# Patient Record
Sex: FEMALE | Race: BLACK OR AFRICAN AMERICAN | Employment: OTHER | URBAN - METROPOLITAN AREA
[De-identification: names, ages, dates, MRNs, and addresses within clinical notes are randomized per-mention and may not be internally consistent; named-entity substitution may affect disease eponyms.]

---

## 2017-01-18 ENCOUNTER — OFFICE VISIT (OUTPATIENT)
Dept: FAMILY MEDICINE CLINIC | Age: 62
End: 2017-01-18

## 2017-01-18 VITALS
OXYGEN SATURATION: 96 % | HEART RATE: 78 BPM | RESPIRATION RATE: 16 BRPM | SYSTOLIC BLOOD PRESSURE: 127 MMHG | TEMPERATURE: 98.1 F | HEIGHT: 64 IN | DIASTOLIC BLOOD PRESSURE: 64 MMHG | BODY MASS INDEX: 33.53 KG/M2 | WEIGHT: 196.4 LBS

## 2017-01-18 DIAGNOSIS — F41.9 ANXIETY: ICD-10-CM

## 2017-01-18 DIAGNOSIS — Z79.899 ENCOUNTER FOR LONG-TERM (CURRENT) USE OF MEDICATIONS: ICD-10-CM

## 2017-01-18 DIAGNOSIS — E78.00 HYPERCHOLESTEROLEMIA: ICD-10-CM

## 2017-01-18 DIAGNOSIS — Q82.8 PALMOPLANTAR KERATODERMA: Primary | ICD-10-CM

## 2017-01-18 DIAGNOSIS — I10 ESSENTIAL HYPERTENSION: ICD-10-CM

## 2017-01-18 RX ORDER — ALPRAZOLAM 0.5 MG/1
0.5 TABLET ORAL
Qty: 30 TAB | Refills: 1 | Status: SHIPPED | OUTPATIENT
Start: 2017-01-18 | End: 2018-02-19 | Stop reason: SDUPTHER

## 2017-01-18 RX ORDER — UREA 40 G/100G
LOTION TOPICAL
Qty: 236.6 ML | Refills: 0 | Status: SHIPPED | OUTPATIENT
Start: 2017-01-18 | End: 2020-12-29 | Stop reason: ALTCHOICE

## 2017-01-18 RX ORDER — TRAMADOL HYDROCHLORIDE 50 MG/1
50 TABLET ORAL
Qty: 45 TAB | Refills: 0 | Status: SHIPPED | OUTPATIENT
Start: 2017-01-18 | End: 2018-08-13 | Stop reason: ALTCHOICE

## 2017-01-18 NOTE — PROGRESS NOTES
Subjective:     Chief Complaint   Patient presents with    Follow-up     Dyshidrotic eczema      She  is a 59 y.o. 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 Seen at [facility] and tried Prednisone. Tried diabetic foot soaks and Carasol? No exposures to anything new. No changes in footwear. No new gloves. No new soaps, detergents, new foods. Gold bond did not help much. Does not get manicures or pedicures anymore. No recent stressful events. Tried Clobetasol for hand and foot eczema at last appt and Amlactin to help with scaling but did not seem to help. Pain is worse in cracks on soles. Pt has never had a rash like this before. Thought to be Keratoderm. Seen by Derm and confirmed with bx. Started on Urea lotion so far. HTN & HLD  Doing well on current meds with no medication side effects noted  No TIA's, no chest pain on exertion, no dyspnea on exertion, no swelling of ankles. Exercising - walking daily x 2 miles daily  Dieting - Yes with weight watchers, grieving since brother passed away about 4 months ago and has ended up comfort eating occ.   Smoking - No     Lab Results   Component Value Date/Time    Cholesterol, total 151 06/06/2016 12:00 AM    HDL Cholesterol 27 06/06/2016 12:00 AM    LDL, calculated 84 06/06/2016 12:00 AM    Triglyceride 202 06/06/2016 12:00 AM       ROS  Gen - no fever/chills  Resp - no dyspnea or cough  CV - no chest pain or SORENSON  Skin - per HPI  Rest per HPI    Past Medical History   Diagnosis Date    Anxiety state, unspecified     Depression     Diverticulosis     DJD (DEGENERATIVE JOINT DISEASE) OF KNEE     GERD (gastroesophageal reflux disease)     Hypercholesterolemia     Hypertension     Prepyloric ulcer 2008     Past Surgical History   Procedure Laterality Date    Endoscopy, colon, diagnostic       due 2015     Current Outpatient Prescriptions on File Prior to Visit   Medication Sig Dispense Refill    lisinopril-hydroCHLOROthiazide (PRINZIDE, ZESTORETIC) 20-25 mg per tablet TAKE ONE TABLET BY MOUTH ONCE DAILY 90 Tab 1     No current facility-administered medications on file prior to visit. Objective:     Vitals:    01/18/17 1559   BP: 127/64   Pulse: 78   Resp: 16   Temp: 98.1 °F (36.7 °C)   TempSrc: Oral   SpO2: 96%   Weight: 196 lb 6.4 oz (89.1 kg)   Height: 5' 4\" (1.626 m)       Physical Examination:  General appearance - alert, well appearing, and in no distress  Eyes -sclera anicteric  Neck - no thyromegaly, no LAD  Chest - clear to auscultation, no wheezes, rales or rhonchi, symmetric air entry  Heart - normal rate, regular rhythm, normal S1, S2, no murmurs, rubs, clicks or gallops  Extr - no edema  Skin - bilat hands and bilat feet with severe skin thickening, scaling, cracking, and irritation along soles. Feet with mild non-pitting edema          Assessment/ Plan:   Yoli Grover was seen today for follow-up. Diagnoses and all orders for this visit:    Palmoplantar keratoderma - getting 2nd opinion with another Derm for further tx. Emollients and keratolytics. Tramadol for pain control.  -     urea 40 % lotion; Apply  to affected area nightly. -     traMADol (ULTRAM) 50 mg tablet; Take 1 Tab by mouth every eight (8) hours as needed for Pain. Max Daily Amount: 150 mg.    Essential hypertension - well controlled  -     METABOLIC PANEL, COMPREHENSIVE    Hypercholesterolemia - stable  -     METABOLIC PANEL, COMPREHENSIVE  -     LIPID PANEL    Encounter for long-term (current) use of medications  -     METABOLIC PANEL, COMPREHENSIVE  -     LIPID PANEL    I have discussed the diagnosis with the patient and the intended plan as seen in the above orders. The patient has received an after-visit summary and questions were answered concerning future plans. I have discussed medication side effects and warnings with the patient as well.   The patient verbalizes understanding and agreement with the plan. Follow-up Disposition:  Return in about 6 months (around 7/18/2017), or if symptoms worsen or fail to improve.

## 2017-01-18 NOTE — MR AVS SNAPSHOT
Visit Information Date & Time Provider Department Dept. Phone Encounter #  
 1/18/2017  3:40 PM Alicia Segal MD [facility] at [address] [id number] Follow-up Instructions Return in about 6 months (around 7/18/2017), or if symptoms worsen or fail to improve. Upcoming Health Maintenance Date Due Hepatitis C Screening 1955 PAP AKA CERVICAL CYTOLOGY 3/10/2014 INFLUENZA AGE 9 TO ADULT 8/1/2016 BREAST CANCER SCRN MAMMOGRAM 10/24/2018 DTaP/Tdap/Td series (2 - Td) 5/16/2026 COLONOSCOPY 12/9/2026 Allergies as of 1/18/2017  Review Complete On: 1/18/2017 By: Alicia Segal MD  
 No Known Allergies Current Immunizations  Never Reviewed No immunizations on file. Not reviewed this visit You Were Diagnosed With   
  
 Codes Comments Palmoplantar keratoderma    -  Primary ICD-10-CM: Q82.8 ICD-9-CM: 757.39 Anxiety     ICD-10-CM: F41.9 ICD-9-CM: 300.00 Essential hypertension     ICD-10-CM: I10 
ICD-9-CM: 401.9 Hypercholesterolemia     ICD-10-CM: E78.00 ICD-9-CM: 272.0 Encounter for long-term (current) use of medications     ICD-10-CM: Z79.899 ICD-9-CM: V58.69 Vitals BP Pulse Temp Resp Height(growth percentile) Weight(growth percentile) 127/64 (BP 1 Location: Left arm, BP Patient Position: Sitting) 78 98.1 °F (36.7 °C) (Oral) 16 5' 4\" (1.626 m) 196 lb 6.4 oz (89.1 kg) SpO2 BMI OB Status Smoking Status 96% 33.71 kg/m2 Postmenopausal Former Smoker BMI and BSA Data Body Mass Index Body Surface Area 33.71 kg/m 2 2.01 m 2 Preferred Pharmacy Pharmacy Name Phone [facility] [place], [address] [phone] Your Updated Medication List  
  
   
This list is accurate as of: 1/18/17  4:48 PM.  Always use your most recent med list.  
  
  
  
  
 ALPRAZolam 0.5 mg tablet Commonly known as:  Shyrl Asai Take 1 Tab by mouth nightly as needed. lisinopril-hydroCHLOROthiazide 20-25 mg per tablet Commonly known as:  PRINZIDE, ZESTORETIC  
TAKE ONE TABLET BY MOUTH ONCE DAILY  
  
 traMADol 50 mg tablet Commonly known as:  ULTRAM  
Take 1 Tab by mouth every eight (8) hours as needed for Pain. Max Daily Amount: 150 mg.  
  
 urea 40 % lotion Apply  to affected area nightly. Prescriptions Printed Refills ALPRAZolam (XANAX) 0.5 mg tablet 1 Sig: Take 1 Tab by mouth nightly as needed. Class: Print Route: Oral  
 traMADol (ULTRAM) 50 mg tablet 0 Sig: Take 1 Tab by mouth every eight (8) hours as needed for Pain. Max Daily Amount: 150 mg.  
 Class: Print Route: Oral  
  
Prescriptions Sent to Pharmacy Refills  
 urea 40 % lotion 0 Sig: Apply  to affected area nightly. Class: Normal  
 Pharmacy: [address] [facility], [address] Ph #: [phone] Route: Topical  
  
We Performed the Following LIPID PANEL [60247 CPT(R)] METABOLIC PANEL, COMPREHENSIVE [96827 CPT(R)] Follow-up Instructions Return in about 6 months (around 7/18/2017), or if symptoms worsen or fail to improve. Introducing [facility]! Dear Dona Peabody: Thank you for requesting a [employer] account. Our records indicate that you have previously registered for a [employer] account but its currently inactive. Please call our [employer] support line at [phone]. Additional Information If you have questions, please visit the Frequently Asked Questions section of the [employer] website at [email or web address] [email or web address] Lookback. [email or web address]/[email or web address]/. Remember, [employer] is NOT to be used for urgent needs. For medical emergencies, dial 911. Now available from your iPhone and Android! Please provide this summary of care documentation to your next provider. Your primary care clinician is listed as Paris Alonso.  If you have any questions after today's visit, please call [phone].

## 2017-02-09 LAB
ALBUMIN SERPL-MCNC: 4.1 G/DL (ref 3.6–4.8)
ALBUMIN/GLOB SERPL: 1.1 {RATIO} (ref 1.1–2.5)
ALP SERPL-CCNC: 81 IU/L (ref 39–117)
ALT SERPL-CCNC: 16 IU/L (ref 0–32)
AST SERPL-CCNC: 19 IU/L (ref 0–40)
BILIRUB SERPL-MCNC: 0.3 MG/DL (ref 0–1.2)
BUN SERPL-MCNC: 18 MG/DL (ref 8–27)
BUN/CREAT SERPL: 22 (ref 11–26)
CALCIUM SERPL-MCNC: 9.4 MG/DL (ref 8.7–10.3)
CHLORIDE SERPL-SCNC: 97 MMOL/L (ref 96–106)
CHOLEST SERPL-MCNC: 162 MG/DL (ref 100–199)
CO2 SERPL-SCNC: 28 MMOL/L (ref 18–29)
CREAT SERPL-MCNC: 0.83 MG/DL (ref 0.57–1)
GLOBULIN SER CALC-MCNC: 3.6 G/DL (ref 1.5–4.5)
GLUCOSE SERPL-MCNC: 96 MG/DL (ref 65–99)
HDLC SERPL-MCNC: 30 MG/DL
LDLC SERPL CALC-MCNC: 101 MG/DL (ref 0–99)
POTASSIUM SERPL-SCNC: 4.2 MMOL/L (ref 3.5–5.2)
PROT SERPL-MCNC: 7.7 G/DL (ref 6–8.5)
SODIUM SERPL-SCNC: 139 MMOL/L (ref 134–144)
TRIGL SERPL-MCNC: 156 MG/DL (ref 0–149)
VLDLC SERPL CALC-MCNC: 31 MG/DL (ref 5–40)

## 2017-02-22 ENCOUNTER — PATIENT OUTREACH (OUTPATIENT)
Dept: OTHER | Age: 62
End: 2017-02-22

## 2017-02-27 ENCOUNTER — PATIENT OUTREACH (OUTPATIENT)
Dept: OTHER | Age: 62
End: 2017-02-27

## 2017-09-29 ENCOUNTER — TELEPHONE (OUTPATIENT)
Dept: FAMILY MEDICINE CLINIC | Age: 62
End: 2017-09-29

## 2017-09-29 NOTE — TELEPHONE ENCOUNTER
Referred to Medical Records [phone] or fax request to [phone]. Company requesting patient records for last five years.

## 2017-09-29 NOTE — TELEPHONE ENCOUNTER
----- Message from Karlo Almodovar sent at 9/29/2017 10:25 AM EDT -----  Regarding: Dr. Zoie Jackson from [facility], [facility], inquiring if the office received faxed and mailed forms regarding pt. Also inquiring on return time. (f) [id number] Best contact number [phone] case number [id number].

## 2017-12-14 DIAGNOSIS — L30.1 DYSHIDROTIC ECZEMA: ICD-10-CM

## 2017-12-14 RX ORDER — LISINOPRIL AND HYDROCHLOROTHIAZIDE 20; 25 MG/1; MG/1
TABLET ORAL
Qty: 90 TAB | Refills: 1 | Status: SHIPPED | OUTPATIENT
Start: 2017-12-14 | End: 2018-06-15 | Stop reason: SDUPTHER

## 2017-12-14 NOTE — LETTER
12/15/2017 10:55 AM 
 
Ms. Ana Cristina Chang 
[address] [phone] Dear Ms. Adam Lazar missed you! Please call our office at [phone] and schedule a follow up appointment for your continued care.  
 
 
 
Sincerely, 
 
 
Forest Bennett MD

## 2018-02-19 ENCOUNTER — OFFICE VISIT (OUTPATIENT)
Dept: FAMILY MEDICINE CLINIC | Age: 63
End: 2018-02-19

## 2018-02-19 VITALS
DIASTOLIC BLOOD PRESSURE: 67 MMHG | BODY MASS INDEX: 32.33 KG/M2 | TEMPERATURE: 97.9 F | SYSTOLIC BLOOD PRESSURE: 134 MMHG | WEIGHT: 189.4 LBS | OXYGEN SATURATION: 98 % | HEART RATE: 76 BPM | HEIGHT: 64 IN | RESPIRATION RATE: 16 BRPM

## 2018-02-19 DIAGNOSIS — Z00.00 WELL ADULT EXAM: Primary | ICD-10-CM

## 2018-02-19 DIAGNOSIS — F41.9 ANXIETY: ICD-10-CM

## 2018-02-19 DIAGNOSIS — E78.00 HYPERCHOLESTEROLEMIA: ICD-10-CM

## 2018-02-19 DIAGNOSIS — I10 ESSENTIAL HYPERTENSION: ICD-10-CM

## 2018-02-19 DIAGNOSIS — R73.03 PREDIABETES: ICD-10-CM

## 2018-02-19 LAB
BILIRUB UR QL STRIP: NEGATIVE
GLUCOSE UR-MCNC: NEGATIVE MG/DL
KETONES P FAST UR STRIP-MCNC: NEGATIVE MG/DL
PH UR STRIP: 5.5 [PH] (ref 4.6–8)
PROT UR QL STRIP: NEGATIVE
SP GR UR STRIP: 1.02 (ref 1–1.03)
UA UROBILINOGEN AMB POC: NORMAL (ref 0.2–1)
URINALYSIS CLARITY POC: CLEAR
URINALYSIS COLOR POC: YELLOW
URINE BLOOD POC: NORMAL
URINE LEUKOCYTES POC: NORMAL
URINE NITRITES POC: NEGATIVE

## 2018-02-19 RX ORDER — ALPRAZOLAM 0.5 MG/1
0.5 TABLET ORAL
Qty: 30 TAB | Refills: 1 | Status: SHIPPED | OUTPATIENT
Start: 2018-02-19 | End: 2018-08-13 | Stop reason: SDUPTHER

## 2018-02-19 NOTE — PROGRESS NOTES
Chief Complaint   Patient presents with    Complete Physical   .1. Have you been to the ER, urgent care clinic since your last visit? Hospitalized since your last visit? No    2. Have you seen or consulted any other health care providers outside of the [address] [place] [address] since your last visit? Include any pap smears or colon screening. Yes When: seen by Dermatologist [facility]) 8/2017 & GYN 10/2017  Patient concerned about: right lower back pain, right hip and right knee pain occasionally that started 2 weeks ago and funny feeling of upper Quadrant of Abdomen that started 2-3 months ago and numbness, tingling and tightness of hands for 6-7 months.   Patient stated that she had her Mammogram and Pap done at [facility] 10/2017  Room #8

## 2018-02-19 NOTE — MR AVS SNAPSHOT
[address] [facility] [address] [facility] 
[phone] Patient: Bob Toledo MRN:  [id number] Visit Information Date & Time Provider Department Dept. Phone Encounter #  
 2/19/2018  3:40 PM Eli Nolasco MD [facility] at [address] [id number] Follow-up Instructions Return in about 6 months (around 8/19/2018), or if symptoms worsen or fail to improve, for Regular Follow up. Upcoming Health Maintenance Date Due Hepatitis C Screening 1955 PAP AKA CERVICAL CYTOLOGY 3/10/2014 Influenza Age 5 to Adult 8/1/2017 BREAST CANCER SCRN MAMMOGRAM 10/24/2018 DTaP/Tdap/Td series (2 - Td) 5/16/2026 COLONOSCOPY 12/9/2026 Allergies as of 2/19/2018  Review Complete On: 2/19/2018 By: Trey Shine No Known Allergies Current Immunizations  Never Reviewed No immunizations on file. Not reviewed this visit You Were Diagnosed With   
  
 Codes Comments Well adult exam    -  Primary ICD-10-CM: Z00.00 ICD-9-CM: V70.0 Anxiety     ICD-10-CM: F41.9 ICD-9-CM: 300.00 Essential hypertension     ICD-10-CM: I10 
ICD-9-CM: 401.9 Hypercholesterolemia     ICD-10-CM: E78.00 ICD-9-CM: 272.0 Prediabetes     ICD-10-CM: R73.03 
ICD-9-CM: 790.29 Vitals BP Pulse Temp Resp Height(growth percentile) Weight(growth percentile) 134/67 (BP 1 Location: Left arm, BP Patient Position: Sitting) 76 97.9 °F (36.6 °C) (Oral) 16 5' 4\" (1.626 m) 189 lb 6.4 oz (85.9 kg) SpO2 BMI OB Status Smoking Status 98% 32.51 kg/m2 Postmenopausal Former Smoker Vitals History BMI and BSA Data Body Mass Index Body Surface Area 32.51 kg/m 2 1.97 m 2 Preferred Pharmacy Pharmacy Name Phone [address] [phone] Your Updated Medication List  
  
   
 This list is accurate as of: 2/19/18  4:33 PM.  Always use your most recent med list.  
  
  
  
  
 ALPRAZolam 0.5 mg tablet Commonly known as:  Elray Bald Take 1 Tab by mouth nightly as needed. lisinopril-hydroCHLOROthiazide 20-25 mg per tablet Commonly known as:  PRINZIDE, ZESTORETIC  
TAKE ONE TABLET BY MOUTH ONCE DAILY  
  
 ONE-A-DAY WOMEN'S 50 PLUS PO Take 1 Tab by mouth daily. traMADol 50 mg tablet Commonly known as:  ULTRAM  
Take 1 Tab by mouth every eight (8) hours as needed for Pain. Max Daily Amount: 150 mg.  
  
 urea 40 % lotion Apply  to affected area nightly. Prescriptions Printed Refills ALPRAZolam (XANAX) 0.5 mg tablet 1 Sig: Take 1 Tab by mouth nightly as needed. Class: Print Route: Oral  
  
We Performed the Following AMB POC URINALYSIS DIP STICK AUTO W/O MICRO [28152 CPT(R)] CBC W/O DIFF [85090 CPT(R)] HEMOGLOBIN A1C WITH EAG [61007 CPT(R)] LIPID PANEL [83535 CPT(R)] METABOLIC PANEL, COMPREHENSIVE [26902 CPT(R)] TSH 3RD GENERATION [83177 CPT(R)] Follow-up Instructions Return in about 6 months (around 8/19/2018), or if symptoms worsen or fail to improve, for Regular Follow up. Introducing [facility]! Dear Thomas Maillard: Thank you for requesting a [employer] account. Our records indicate that you have previously registered for a [employer] account but its currently inactive. Please call our [employer] support line at [phone]. Additional Information If you have questions, please visit the Frequently Asked Questions section of the [employer] website at [email or web address]. [email or web address] Remember, [employer] is NOT to be used for urgent needs. For medical emergencies, dial 911. Now available from your iPhone and Android! Please provide this summary of care documentation to your next provider. Your primary care clinician is listed as Jj Zepeda.  If you have any questions after today's visit, please call [phone].

## 2018-02-19 NOTE — PROGRESS NOTES
Subjective:     Chief Complaint   Patient presents with    Complete Physical        She  is a 57 y.o. female who presents for evaluation of:  Colonoscopy - UTD  [facility] on paps and mammos    Hyperlipidemia & HTN  Pt is doing well on current meds with no medication side effects noted  No new myalgias, no joint pains, no weakness  No TIA's, no chest pain on exertion, no dyspnea on exertion, no swelling of ankles.   Exercising - Minimal, pain in knees, back, and right hip  Dieting - Yes  Smoking - No     Lab Results   Component Value Date/Time    Cholesterol, total 162 02/08/2017 07:49 AM    HDL Cholesterol 30 (L) 02/08/2017 07:49 AM    LDL, calculated 101 (H) 02/08/2017 07:49 AM    Triglyceride 156 (H) 02/08/2017 07:49 AM     ROS:  Constitutional: negative for fevers, chills and fatigue  Eyes: negative for visual disturbance  Ears, nose, mouth, throat, and face: negative for hearing loss and sore throat  Respiratory: negative for cough or dyspnea on exertion  Cardiovascular: negative for chest pain, dyspnea, palpitations, fatigue  Gastrointestinal: negative for nausea, vomiting, change in bowel habits, diarrhea and abdominal pain  Genitourinary:negative for frequency and dysuria  Integument/breast: 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  Hematologic/lymphatic: negative for easy bruising and bleeding  Musculoskeletal:negative for myalgias and muscle weakness  Neurological: negative for headaches and dizziness  Behavioral/Psych: negative for anxiety and depression     Objective:     Vitals:    02/19/18 1551   BP: 134/67   Pulse: 76   Resp: 16   Temp: 97.9 °F (36.6 °C)   TempSrc: Oral   SpO2: 98%   Weight: 189 lb 6.4 oz (85.9 kg)   Height: 5' 4\" (1.626 m)     Physical Examination:  General appearance - alert, well appearing, and in no distress  Eyes - sclera anicteric  Nose - normal and patent, no erythema, discharge or polyps  Mouth - mucous membranes moist, pharynx normal without lesions  Neck - supple, no significant adenopathy  Lymphatics - no palpable lymphadenopathy, no hepatosplenomegaly  Chest - clear to auscultation, no wheezes, rales or rhonchi, symmetric air entry  Heart - normal rate, regular rhythm, normal S1, S2, no murmurs, rubs, clicks or gallops  Abdomen - soft, nontender, nondistended, no masses or organomegaly  Breasts & Pelvic - exam declined by the patient as done by OB/GYN  Neurological - alert, oriented, normal speech, no focal findings or movement disorder noted  Musculoskeletal - no joint tenderness, deformity or swelling  Extr - trace edema  Skin - bilat feet with thickening, scaling, cracking, and irritation on bilat soles - much improved    Past Medical History:   Diagnosis Date    Anxiety state, unspecified     Depression     Diverticulosis     DJD (DEGENERATIVE JOINT DISEASE) OF KNEE     GERD (gastroesophageal reflux disease)     Hypercholesterolemia     Hypertension     Prepyloric ulcer 2008     Past Surgical History:   Procedure Laterality Date    ENDOSCOPY, COLON, DIAGNOSTIC      due 2015     Current Outpatient Prescriptions on File Prior to Visit   Medication Sig Dispense Refill    lisinopril-hydroCHLOROthiazide (PRINZIDE, ZESTORETIC) 20-25 mg per tablet TAKE ONE TABLET BY MOUTH ONCE DAILY 90 Tab 1    urea 40 % lotion Apply  to affected area nightly. 236.6 mL 0    traMADol (ULTRAM) 50 mg tablet Take 1 Tab by mouth every eight (8) hours as needed for Pain. Max Daily Amount: 150 mg. 45 Tab 0     No current facility-administered medications on file prior to visit. Assessment/ Plan:   Diagnoses and all orders for this visit:    1. Well adult exam-up-to-date with routine screening  -     CBC W/O DIFF  -     HEMOGLOBIN A1C WITH EAG  -     LIPID PANEL  -     METABOLIC PANEL, COMPREHENSIVE  -     TSH 3RD GENERATION  -     AMB POC URINALYSIS DIP STICK AUTO W/O MICRO    2.  Anxiety- rare use of Xanax and does well with no regular medications, working on daily exercise and has good social support  -     ALPRAZolam (XANAX) 0.5 mg tablet; Take 1 Tab by mouth nightly as needed. 3. Essential hypertension-well-controlled  -     METABOLIC PANEL, COMPREHENSIVE  -     AMB POC URINALYSIS DIP STICK AUTO W/O MICRO    4. Hypercholesterolemia-stable  -     HEMOGLOBIN A1C WITH EAG  -     LIPID PANEL  -     METABOLIC PANEL, COMPREHENSIVE  -     TSH 3RD GENERATION    5. Prediabetes-stable, encouraged working on exercise and diet regularly.  -     HEMOGLOBIN A1C WITH EAG    6. BMI 32.0-32.9,adult  Discussed the patient's BMI. The BMI follow up plan is as follows:   dietary management education, guidance, and counseling  encourage exercise  monitor weight  prescribed dietary intake     I have discussed the diagnosis with the patient and the intended plan as seen in the above orders. The patient has received an after-visit summary and questions were answered concerning future plans. I have discussed medication side effects and warnings with the patient as well. The patient verbalizes understanding and agreement with the plan. Follow-up Disposition:  Return in about 6 months (around 8/19/2018), or if symptoms worsen or fail to improve, for Regular Follow up.

## 2018-02-22 LAB
ALBUMIN SERPL-MCNC: 4.3 G/DL (ref 3.6–4.8)
ALBUMIN/GLOB SERPL: 1.2 {RATIO} (ref 1.2–2.2)
ALP SERPL-CCNC: 83 IU/L (ref 39–117)
ALT SERPL-CCNC: 22 IU/L (ref 0–32)
AST SERPL-CCNC: 22 IU/L (ref 0–40)
BILIRUB SERPL-MCNC: 0.3 MG/DL (ref 0–1.2)
BUN SERPL-MCNC: 14 MG/DL (ref 8–27)
BUN/CREAT SERPL: 16 (ref 12–28)
CALCIUM SERPL-MCNC: 9.6 MG/DL (ref 8.7–10.3)
CHLORIDE SERPL-SCNC: 97 MMOL/L (ref 96–106)
CHOLEST SERPL-MCNC: 186 MG/DL (ref 100–199)
CO2 SERPL-SCNC: 25 MMOL/L (ref 18–29)
CREAT SERPL-MCNC: 0.89 MG/DL (ref 0.57–1)
ERYTHROCYTE [DISTWIDTH] IN BLOOD BY AUTOMATED COUNT: 14.6 % (ref 12.3–15.4)
EST. AVERAGE GLUCOSE BLD GHB EST-MCNC: 126 MG/DL
GFR SERPLBLD CREATININE-BSD FMLA CKD-EPI: 69 ML/MIN/{1.73_M2}
GFR SERPLBLD CREATININE-BSD FMLA CKD-EPI: 79 ML/MIN/{1.73_M2}
GLOBULIN SER CALC-MCNC: 3.6 G/L (ref 1.5–4.5)
GLUCOSE SERPL-MCNC: 96 MG/DL (ref 65–99)
HBA1C MFR BLD: 6 % (ref 4.8–5.6)
HCT VFR BLD AUTO: 39.8 % (ref 34–46.6)
HDLC SERPL-MCNC: 31 MG/DL
HGB BLD-MCNC: 13.2 G/DL (ref 11.1–15.9)
LDLC SERPL CALC-MCNC: 117 MG/DL (ref 0–99)
MCH RBC QN AUTO: 27.2 PG (ref 26.6–33)
MCHC RBC AUTO-ENTMCNC: 33.2 G/DL (ref 31.5–35.7)
MCV RBC AUTO: 82 FL (ref 79–97)
PLATELET # BLD AUTO: 326 X10E3/UL (ref 150–379)
POTASSIUM SERPL-SCNC: 4.2 MMOL/L (ref 3.5–5.2)
PROT SERPL-MCNC: 7.9 G/DL (ref 6–8.5)
RBC # BLD AUTO: 4.86 X10E6/UL (ref 3.77–5.28)
SODIUM SERPL-SCNC: 137 MMOL/L (ref 134–144)
TRIGL SERPL-MCNC: 192 MG/DL (ref 0–149)
TSH SERPL DL<=0.005 MIU/L-ACNC: 2.1 UIU/ML (ref 0.45–4.5)
VLDLC SERPL CALC-MCNC: 38 MG/DL (ref 5–40)
WBC # BLD AUTO: 6.4 X10E3/UL (ref 3.4–10.8)

## 2018-06-15 DIAGNOSIS — L30.1 DYSHIDROTIC ECZEMA: ICD-10-CM

## 2018-06-15 RX ORDER — LISINOPRIL AND HYDROCHLOROTHIAZIDE 20; 25 MG/1; MG/1
TABLET ORAL
Qty: 90 TAB | Refills: 1 | Status: SHIPPED | OUTPATIENT
Start: 2018-06-15 | End: 2018-12-17 | Stop reason: SDUPTHER

## 2018-08-13 ENCOUNTER — OFFICE VISIT (OUTPATIENT)
Dept: FAMILY MEDICINE CLINIC | Age: 63
End: 2018-08-13

## 2018-08-13 VITALS
DIASTOLIC BLOOD PRESSURE: 61 MMHG | WEIGHT: 193.4 LBS | RESPIRATION RATE: 16 BRPM | OXYGEN SATURATION: 94 % | BODY MASS INDEX: 33.02 KG/M2 | HEIGHT: 64 IN | HEART RATE: 84 BPM | SYSTOLIC BLOOD PRESSURE: 117 MMHG | TEMPERATURE: 98.3 F

## 2018-08-13 DIAGNOSIS — M25.551 RIGHT HIP PAIN: Primary | ICD-10-CM

## 2018-08-13 DIAGNOSIS — F41.9 ANXIETY: ICD-10-CM

## 2018-08-13 DIAGNOSIS — E78.00 HYPERCHOLESTEROLEMIA: ICD-10-CM

## 2018-08-13 DIAGNOSIS — S76.211D GROIN STRAIN, RIGHT, SUBSEQUENT ENCOUNTER: ICD-10-CM

## 2018-08-13 DIAGNOSIS — I10 ESSENTIAL HYPERTENSION: ICD-10-CM

## 2018-08-13 DIAGNOSIS — Z79.899 ENCOUNTER FOR LONG-TERM (CURRENT) USE OF MEDICATIONS: ICD-10-CM

## 2018-08-13 RX ORDER — ALPRAZOLAM 0.5 MG/1
0.5 TABLET ORAL
Qty: 30 TAB | Refills: 1 | Status: SHIPPED | OUTPATIENT
Start: 2018-08-13 | End: 2019-03-12 | Stop reason: SDUPTHER

## 2018-08-13 RX ORDER — PHENTERMINE HYDROCHLORIDE 37.5 MG/1
37.5 TABLET ORAL
Qty: 30 TAB | Refills: 1 | Status: SHIPPED | OUTPATIENT
Start: 2018-08-13 | End: 2019-03-12 | Stop reason: ALTCHOICE

## 2018-08-13 RX ORDER — MELOXICAM 15 MG/1
15 TABLET ORAL
Qty: 30 TAB | Refills: 0 | Status: SHIPPED | OUTPATIENT
Start: 2018-08-13 | End: 2020-07-28 | Stop reason: ALTCHOICE

## 2018-08-13 NOTE — MR AVS SNAPSHOT
[address] [facility] [address] [facility] 
[phone] Patient: Sagrario Peterson MRN:  [id number] Visit Information Date & Time Provider Department Dept. Phone Encounter #  
 8/13/2018  3:30 PM Aleta Wells MD [facility] at [address] [id number] Follow-up Instructions Return in about 3 months (around 11/13/2018). Upcoming Health Maintenance Date Due Hepatitis C Screening 1955 GLAUCOMA SCREENING Q2Y 5/14/2018 Bone Densitometry (Dexa) Screening 5/14/2018 BREAST CANCER SCRN MAMMOGRAM 10/24/2018 Pneumococcal 65+ Low/Medium Risk (1 of 2 - PCV13) 11/24/2018* Influenza Age 5 to Adult 1/25/2019* DTaP/Tdap/Td series (2 - Td) 5/16/2026 COLONOSCOPY 12/9/2026 *Topic was postponed. The date shown is not the original due date. Allergies as of 8/13/2018  Review Complete On: 8/13/2018 By: Jonn Handy LPN No Known Allergies Current Immunizations  Never Reviewed No immunizations on file. Not reviewed this visit You Were Diagnosed With   
  
 Codes Comments Right hip pain    -  Primary ICD-10-CM: M25.551 ICD-9-CM: 719.45 Groin strain, right, subsequent encounter     ICD-10-CM: S76.211D ICD-9-CM: V58.89 Essential hypertension     ICD-10-CM: I10 
ICD-9-CM: 401.9 Hypercholesterolemia     ICD-10-CM: E78.00 ICD-9-CM: 272.0 Encounter for long-term (current) use of medications     ICD-10-CM: Z79.899 ICD-9-CM: V58.69 BMI 33.0-33.9,adult     ICD-10-CM: V65.08 
ICD-9-CM: V85.33 Vitals BP Pulse Temp Resp Height(growth percentile) Weight(growth percentile)  
 117/61 (BP 1 Location: Left arm, BP Patient Position: Sitting) 84 98.3 °F (36.8 °C) (Oral) 16 5' 4\" (1.626 m) 193 lb 6.4 oz (87.7 kg) SpO2 BMI OB Status Smoking Status 94% 33.2 kg/m2 Postmenopausal Former Smoker Vitals History BMI and BSA Data Body Mass Index Body Surface Area  
 33.2 kg/m 2 1.99 m 2 Preferred Pharmacy Pharmacy Name Phone [address] [phone] Your Updated Medication List  
  
   
This list is accurate as of 8/13/18  4:27 PM.  Always use your most recent med list.  
  
  
  
  
 ALPRAZolam 0.5 mg tablet Commonly known as:  Sharyon Kida Take 1 Tab by mouth nightly as needed. lisinopril-hydroCHLOROthiazide 20-25 mg per tablet Commonly known as:  PRINZIDE, ZESTORETIC  
TAKE ONE TABLET BY MOUTH ONCE DAILY  
  
 meloxicam 15 mg tablet Commonly known as:  MOBIC Take 1 Tab by mouth daily (after breakfast). Take x 1 week and then stop. May use daily after this as needed. ONE-A-DAY WOMEN'S 50 PLUS PO Take 1 Tab by mouth daily. phentermine 37.5 mg tablet Commonly known as:  ADIPEX-P Take 1 Tab by mouth every morning. Max Daily Amount: 37.5 mg.  
  
 traMADol 50 mg tablet Commonly known as:  ULTRAM  
Take 1 Tab by mouth every eight (8) hours as needed for Pain. Max Daily Amount: 150 mg.  
  
 urea 40 % lotion Apply  to affected area nightly. Prescriptions Printed Refills  
 phentermine (ADIPEX-P) 37.5 mg tablet 1 Sig: Take 1 Tab by mouth every morning. Max Daily Amount: 37.5 mg.  
 Class: Print Route: Oral  
  
Prescriptions Sent to Pharmacy Refills  
 meloxicam (MOBIC) 15 mg tablet 0 Sig: Take 1 Tab by mouth daily (after breakfast). Take x 1 week and then stop. May use daily after this as needed. Class: Normal  
 Pharmacy: [facility] [address] Ph #: [phone] Route: Oral  
  
We Performed the Following REFERRAL TO PHYSICAL THERAPY [CFU21 Custom] Follow-up Instructions Return in about 3 months (around 11/13/2018). To-Do List   
 08/13/2018 Imaging:  XR HIP RT W OR WO PELV 2-3 VWS Referral Information Referral ID Referred By Referred To  
  
 [id number] [facility] PHYSICAL THERAPY   
   [address] [facility], [address] Phone: [phone] Visits Status Start Date End Date 1 Authorized 8/13/18 8/13/19 If your referral has a status of pending review or denied, additional information will be sent to support the outcome of this decision. Patient Instructions Phentermine - 37.5 mg daily. Take early in the morning. Let me know if you have any problems with increase heart rate, blood pressure, anxiety or trouble sleeping as these can be side effects. If symptoms are mild, you body should acclimate to this and any related symptoms should improve. The most important part of your overall plan for weight loss is to continue working on exercise and diet. Introducing [facility]! Dear Tucker Ziegler: Thank you for requesting a [employer] account. Our records indicate that you already have an active [employer] account. You can access your account anytime at [email or web address] Did you know that you can access your hospital and ER discharge instructions at any time in [employer]? You can also review all of your test results from your hospital stay or ER visit. Additional Information If you have questions, please visit the Frequently Asked Questions section of the [employer] website at [email or web address] Remember, [employer] is NOT to be used for urgent needs. For medical emergencies, dial 911. Now available from your iPhone and Android! Please provide this summary of care documentation to your next provider. Your primary care clinician is listed as Jazmin Mancilla. If you have any questions after today's visit, please call [phone].

## 2018-08-13 NOTE — PATIENT INSTRUCTIONS
Phentermine - 37.5 mg daily. Take early in the morning. Let me know if you have any problems with increase heart rate, blood pressure, anxiety or trouble sleeping as these can be side effects. If symptoms are mild, you body should acclimate to this and any related symptoms should improve. The most important part of your overall plan for weight loss is to continue working on exercise and diet.

## 2018-08-13 NOTE — PROGRESS NOTES
Subjective:     Chief Complaint   Patient presents with    Hypertension     6 month follow-up        She  is a 70 y.o. female who presents for evaluation of:    Hyperlipidemia & HTN  Pt is doing well on current meds with no medication side effects noted  No new myalgias, no joint pains, no weakness  No TIA's, no chest pain on exertion, no dyspnea on exertion, no swelling of ankles. Exercising - Minimal, pain in knees, back, and right hip  Dieting - Yes  Smoking - No     Lab Results   Component Value Date/Time    Cholesterol, total 186 02/21/2018 08:39 AM    HDL Cholesterol 31 (L) 02/21/2018 08:39 AM    LDL, calculated 117 (H) 02/21/2018 08:39 AM    Triglyceride 192 (H) 02/21/2018 08:39 AM     ROS  Gen - no fever/chills  Resp - no dyspnea or cough  CV - no chest pain or SORENSON  Musculoskeletal: R hip pain and weakness. No injury. Sx x 2-3 months. Prev able to walk extensively. Now having to use hand to lift up leg going up stairs. Using APAP and aspercream which seems to help. Worse in buttocks when walking. Rest per HPI     Objective:     Vitals:    08/13/18 1527   BP: 117/61   Pulse: 84   Resp: 16   Temp: 98.3 °F (36.8 °C)   TempSrc: Oral   SpO2: 94%   Weight: 193 lb 6.4 oz (87.7 kg)   Height: 5' 4\" (1.626 m)     Physical Examination:  General appearance - alert, well appearing, and in no distress  Eyes - sclera anicteric  Neck - supple, no significant adenopathy  Lymphatics - no palpable lymphadenopathy, no hepatosplenomegaly  Chest - clear to auscultation, no wheezes, rales or rhonchi, symmetric air entry  Heart - normal rate, regular rhythm, normal S1, S2, no murmurs, rubs, clicks or gallops  Msk - R hip with FROM but ttp over inguinal area and near greater trochanter. Pain with internal and external rotation of hip.   Back - no ttp, neg SLR  Extr - trace edema  Skin - bilat feet with thickening, scaling, cracking, and irritation on bilat soles - much improved    Past Medical History:   Diagnosis Date    Anxiety state, unspecified     Depression     Diverticulosis     DJD (DEGENERATIVE JOINT DISEASE) OF KNEE     GERD (gastroesophageal reflux disease)     Hypercholesterolemia     Hypertension     Prepyloric ulcer 2008     Past Surgical History:   Procedure Laterality Date    ENDOSCOPY, COLON, DIAGNOSTIC      due 2015     Current Outpatient Prescriptions on File Prior to Visit   Medication Sig Dispense Refill    lisinopril-hydroCHLOROthiazide (PRINZIDE, ZESTORETIC) 20-25 mg per tablet TAKE ONE TABLET BY MOUTH ONCE DAILY 90 Tab 1    MV-MN/FOLIC ACID/CALCIUM/VIT K (ONE-A-DAY WOMEN'S 50 PLUS PO) Take 1 Tab by mouth daily.  urea 40 % lotion Apply  to affected area nightly. 236.6 mL 0     No current facility-administered medications on file prior to visit. Assessment/ Plan:   Diagnoses and all orders for this visit:    1. Right hip pain  2. Groin strain, right, subsequent encounter  - will get x-rays and start Mobic. Sending to PT given weakness. May need to get MRI of L spine if not improving. Would like to work on weight loss as well  -     XR HIP RT W OR WO PELV 2-3 VWS; Future  -     [facility]  -     meloxicam (MOBIC) 15 mg tablet; Take 1 Tab by mouth daily (after breakfast). Take x 1 week and then stop. May use daily after this as needed. 3. Essential hypertension - well controlled    4. Hypercholesterolemia - stable    5. Encounter for long-term (current) use of medications    6. BMI 33.0-33.9,adult - unable to work on exercise d/t hip issue. Will use Phentermine and work on diet. -     phentermine (ADIPEX-P) 37.5 mg tablet; Take 1 Tab by mouth every morning. Max Daily Amount: 37.5 mg.    7. Anxiety - chronic issue with appropriate sparing use  -     ALPRAZolam (XANAX) 0.5 mg tablet; Take 1 Tab by mouth nightly as needed. I have discussed the diagnosis with the patient and the intended plan as seen in the above orders.   The patient has received an after-visit summary and questions were answered concerning future plans. I have discussed medication side effects and warnings with the patient as well. The patient verbalizes understanding and agreement with the plan. Follow-up Disposition:  Return in about 3 months (around 11/13/2018).

## 2018-08-16 ENCOUNTER — HOSPITAL ENCOUNTER (OUTPATIENT)
Dept: GENERAL RADIOLOGY | Age: 63
Discharge: HOME | End: 2018-08-16
Payer: COMMERCIAL

## 2018-08-16 DIAGNOSIS — M16.11 PRIMARY OSTEOARTHRITIS OF RIGHT HIP: Primary | ICD-10-CM

## 2018-08-16 DIAGNOSIS — Z92.89: ICD-10-CM

## 2018-08-16 PROCEDURE — 73502 X-RAY EXAM HIP UNI 2-3 VIEWS: CPT

## 2018-08-16 RX ORDER — DICLOFENAC SODIUM 10 MG/G
GEL TOPICAL 4 TIMES DAILY
Qty: 100 G | Refills: 0 | Status: SHIPPED | OUTPATIENT
Start: 2018-08-16 | End: 2018-08-24 | Stop reason: SDUPTHER

## 2018-08-24 ENCOUNTER — TELEPHONE (OUTPATIENT)
Dept: FAMILY MEDICINE CLINIC | Age: 63
End: 2018-08-24

## 2018-08-24 DIAGNOSIS — M16.11 PRIMARY OSTEOARTHRITIS OF RIGHT HIP: ICD-10-CM

## 2018-08-24 RX ORDER — DICLOFENAC SODIUM 10 MG/G
GEL TOPICAL 4 TIMES DAILY
Qty: 100 G | Refills: 0 | Status: SHIPPED | OUTPATIENT
Start: 2018-08-24 | End: 2019-03-12 | Stop reason: ALTCHOICE

## 2018-08-24 NOTE — TELEPHONE ENCOUNTER
----- Message from Massimo Sanchez sent at 8/24/2018 11:24 AM EDT -----  Regarding: Dr. Katie Valerio  Pt requested results from x-ray done on 8/16/18.   Best contact: [phone]

## 2018-08-24 NOTE — TELEPHONE ENCOUNTER
Discussed with pt that there was OA in R hip. Can try Mobic and Voltaren gel and if not improving, will send to Ortho for possible steroid injection.

## 2018-12-17 DIAGNOSIS — L30.1 DYSHIDROTIC ECZEMA: ICD-10-CM

## 2018-12-17 RX ORDER — LISINOPRIL AND HYDROCHLOROTHIAZIDE 20; 25 MG/1; MG/1
TABLET ORAL
Qty: 90 TAB | Refills: 1 | Status: SHIPPED | OUTPATIENT
Start: 2018-12-17 | End: 2019-06-06 | Stop reason: SDUPTHER

## 2019-03-12 ENCOUNTER — OFFICE VISIT (OUTPATIENT)
Dept: FAMILY MEDICINE CLINIC | Age: 64
End: 2019-03-12

## 2019-03-12 VITALS
WEIGHT: 188.2 LBS | HEIGHT: 64 IN | HEART RATE: 80 BPM | SYSTOLIC BLOOD PRESSURE: 135 MMHG | RESPIRATION RATE: 16 BRPM | OXYGEN SATURATION: 97 % | DIASTOLIC BLOOD PRESSURE: 77 MMHG | TEMPERATURE: 97.3 F | BODY MASS INDEX: 32.13 KG/M2

## 2019-03-12 DIAGNOSIS — M81.0 POSTMENOPAUSAL BONE LOSS: ICD-10-CM

## 2019-03-12 DIAGNOSIS — E78.00 HYPERCHOLESTEROLEMIA: ICD-10-CM

## 2019-03-12 DIAGNOSIS — Z00.00 WELL ADULT EXAM: Primary | ICD-10-CM

## 2019-03-12 DIAGNOSIS — Z23 ENCOUNTER FOR IMMUNIZATION: ICD-10-CM

## 2019-03-12 DIAGNOSIS — I10 ESSENTIAL HYPERTENSION: ICD-10-CM

## 2019-03-12 DIAGNOSIS — Z11.59 SCREENING FOR VIRAL DISEASE: ICD-10-CM

## 2019-03-12 DIAGNOSIS — R73.03 PREDIABETES: ICD-10-CM

## 2019-03-12 DIAGNOSIS — F41.9 ANXIETY: ICD-10-CM

## 2019-03-12 LAB
BILIRUB UR QL STRIP: NEGATIVE
GLUCOSE UR-MCNC: NEGATIVE MG/DL
KETONES P FAST UR STRIP-MCNC: NEGATIVE MG/DL
PH UR STRIP: 5.5 [PH] (ref 4.6–8)
PROT UR QL STRIP: NEGATIVE
SP GR UR STRIP: 1.02 (ref 1–1.03)
UA UROBILINOGEN AMB POC: NORMAL (ref 0.2–1)
URINALYSIS CLARITY POC: NORMAL
URINALYSIS COLOR POC: NORMAL
URINE BLOOD POC: NORMAL
URINE LEUKOCYTES POC: NORMAL
URINE NITRITES POC: NEGATIVE

## 2019-03-12 RX ORDER — ALPRAZOLAM 0.5 MG/1
0.5 TABLET ORAL
Qty: 30 TAB | Refills: 1 | Status: SHIPPED | OUTPATIENT
Start: 2019-03-12 | End: 2019-08-15 | Stop reason: SDUPTHER

## 2019-03-12 NOTE — PROGRESS NOTES
Subjective:     Chief Complaint   Patient presents with    Complete Physical     Annual      She  is a 70 y.o. female who presents for evaluation of:  CPE    Colonoscopy - UTD  Seeing Dr. Phuong Truong at [facility] - UTD on paps and mammos    Hyperlipidemia & HTN  Pt is doing well on current meds with no medication side effects noted  No new myalgias, no joint pains, no weakness  No TIA's, no chest pain on exertion, no dyspnea on exertion, no swelling of ankles.   Exercising - Minimal, pain in knees, back, and right hip  Dieting - Yes  Smoking - No    Lab Results   Component Value Date/Time    Cholesterol, total 186 02/21/2018 08:39 AM    HDL Cholesterol 31 (L) 02/21/2018 08:39 AM    LDL, calculated 117 (H) 02/21/2018 08:39 AM    Triglyceride 192 (H) 02/21/2018 08:39 AM     ROS:  Constitutional: negative for fevers, chills and fatigue  Eyes: negative for visual disturbance  Ears, nose, mouth, throat, and face: negative for hearing loss and sore throat  Respiratory: negative for cough or dyspnea on exertion  Cardiovascular: negative for chest pain, dyspnea, palpitations, fatigue  Gastrointestinal: negative for nausea, vomiting, change in bowel habits, diarrhea and abdominal pain  Genitourinary:negative for frequency and dysuria  Integument/breast: 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  Hematologic/lymphatic: negative for easy bruising and bleeding  Musculoskeletal:negative for myalgias and muscle weakness  Neurological: negative for headaches and dizziness  Behavioral/Psych: negative for anxiety and depression     Objective:     Vitals:    03/12/19 0756 03/12/19 0808   BP: 143/75 135/77   Pulse: 80    Resp: 16    Temp: 97.3 °F (36.3 °C)    TempSrc: Oral    SpO2: 97%    Weight: 188 lb 3.2 oz (85.4 kg)    Height: 5' 4\" (1.626 m)      Physical Examination:  General appearance - alert, well appearing, and in no distress  Eyes - sclera anicteric  Ears - nml TMs bilat  Nose - normal and patent, no erythema, discharge or polyps  Mouth - mucous membranes moist, pharynx normal without lesions  Neck - supple, no significant adenopathy  Lymphatics - no palpable lymphadenopathy, no hepatosplenomegaly  Chest - clear to auscultation, no wheezes, rales or rhonchi, symmetric air entry  Heart - normal rate, regular rhythm, normal S1, S2, no murmurs, rubs, clicks or gallops  Abdomen - soft, nontender, nondistended, no masses or organomegaly  Breasts & Pelvic - exam declined by the patient as done by OB/GYN  Neurological - alert, oriented, normal speech, no focal findings or movement disorder noted  Musculoskeletal - no joint tenderness, deformity or swelling  Extr - trace edema  Skin - much improved keratoderma with full resolution on hands bilat  Psych - nml mood and affect    Past Medical History:   Diagnosis Date    Anxiety state, unspecified     Depression     Diverticulosis     DJD (Degenerative Joint Disease) of Knee     GERD (gastroesophageal reflux disease)     Hypercholesterolemia     Hypertension     Prepyloric ulcer 2008     Past Surgical History:   Procedure Laterality Date    ENDOSCOPY, COLON, DIAGNOSTIC      due 2015     Current Outpatient Medications on File Prior to Visit   Medication Sig Dispense Refill    lisinopril-hydroCHLOROthiazide (PRINZIDE, ZESTORETIC) 20-25 mg per tablet TAKE 1 TABLET BY MOUTH ONCE DAILY 90 Tab 1    meloxicam (MOBIC) 15 mg tablet Take 1 Tab by mouth daily (after breakfast). Take x 1 week and then stop. May use daily after this as needed. 30 Tab 0    MV-MN/FOLIC ACID/CALCIUM/VIT K (ONE-A-DAY WOMEN'S 50 PLUS PO) Take 1 Tab by mouth daily.  urea 40 % lotion Apply  to affected area nightly. (Patient taking differently: Apply  to affected area nightly as needed.) 236.6 mL 0     No current facility-administered medications on file prior to visit. Assessment/ Plan:   Diagnoses and all orders for this visit:    1.  Well adult exam  -     HEPATITIS C AB  -     CBC W/O DIFF  -     HEMOGLOBIN A1C WITH EAG  -     LIPID PANEL  -     METABOLIC PANEL, COMPREHENSIVE  -     TSH 3RD GENERATION  -     AMB POC URINALYSIS DIP STICK AUTO W/O MICRO    2. Essential hypertension - controlled  -     METABOLIC PANEL, COMPREHENSIVE    3. Hypercholesterolemia - stable  -     HEMOGLOBIN A1C WITH EAG  -     LIPID PANEL  -     METABOLIC PANEL, COMPREHENSIVE  -     TSH 3RD GENERATION    4. BMI 32.0-32.9,adult - working on this, unable to coco Phentermine d/t insomnia. Would like to try Saxenda  Discussed the patient's BMI. The BMI follow up plan is as follows:   dietary management education, guidance, and counseling  encourage exercise  monitor weight  prescribed dietary intake  -     HEMOGLOBIN A1C WITH EAG  -     LIPID PANEL  -     TSH 3RD GENERATION  -     liraglutide (SAXENDA) 3 mg/0.5 mL (18 mg/3 mL) pen; Take 0.6 mg once daily for one week; increase by 0.6 mg daily at weekly intervals to a target dose of 3 mg once daily    5. Prediabetes - encouraged weight loss  -     HEMOGLOBIN A1C WITH EAG    6. Screening for viral disease  -     HEPATITIS C AB    7. Postmenopausal bone loss  -     DEXA BONE DENSITY STUDY AXIAL; Future    8. Anxiety - controlled with sparing use of Xanax  -     ALPRAZolam (XANAX) 0.5 mg tablet; Take 1 Tab by mouth nightly as needed for Anxiety. 9. Encounter for immunization  -     PNEUMOCOCCAL POLYSACCHARIDE VACCINE, 23-VALENT, ADULT OR IMMUNOSUPPRESSED PT DOSE,  -     OR IMMUNIZ ADMIN,1 SINGLE/COMB VAC/TOXOID    I have discussed the diagnosis with the patient and the intended plan as seen in the above orders. The patient has received an after-visit summary and questions were answered concerning future plans. I have discussed medication side effects and warnings with the patient as well. The patient verbalizes understanding and agreement with the plan.       Follow-up Disposition:  Return in about 6 months (around 9/12/2019), or if symptoms worsen or fail to improve.

## 2019-03-12 NOTE — PROGRESS NOTES
Chief Complaint   Patient presents with    Complete Physical     Annual   Discuss pain in hip   Last pap smear/mammo last year  Verbal order for PPSV 23 vaccine per Dr Arianne Fuentes  . She denies any symptoms , reactions or allergies that would exclude them from being immunized today. Risks and adverse reactions were discussed and the VIS was given to them. All questions were addressed. She was observed for 15 min post injection. There were no reactions observed.     Barron Taylor LPN

## 2019-03-13 LAB
ALBUMIN SERPL-MCNC: 4.3 G/DL (ref 3.6–4.8)
ALBUMIN/GLOB SERPL: 1.2 {RATIO} (ref 1.2–2.2)
ALP SERPL-CCNC: 84 IU/L (ref 39–117)
ALT SERPL-CCNC: 17 IU/L (ref 0–32)
AST SERPL-CCNC: 19 IU/L (ref 0–40)
BILIRUB SERPL-MCNC: 0.2 MG/DL (ref 0–1.2)
BUN SERPL-MCNC: 18 MG/DL (ref 8–27)
BUN/CREAT SERPL: 19 (ref 12–28)
CALCIUM SERPL-MCNC: 9.8 MG/DL (ref 8.7–10.3)
CHLORIDE SERPL-SCNC: 100 MMOL/L (ref 96–106)
CHOLEST SERPL-MCNC: 172 MG/DL (ref 100–199)
CO2 SERPL-SCNC: 26 MMOL/L (ref 20–29)
CREAT SERPL-MCNC: 0.96 MG/DL (ref 0.57–1)
ERYTHROCYTE [DISTWIDTH] IN BLOOD BY AUTOMATED COUNT: 14.4 % (ref 12.3–15.4)
EST. AVERAGE GLUCOSE BLD GHB EST-MCNC: 126 MG/DL
GLOBULIN SER CALC-MCNC: 3.7 G/DL (ref 1.5–4.5)
GLUCOSE SERPL-MCNC: 103 MG/DL (ref 65–99)
HBA1C MFR BLD: 6 % (ref 4.8–5.6)
HCT VFR BLD AUTO: 40.1 % (ref 34–46.6)
HCV AB S/CO SERPL IA: <0.1 S/CO RATIO (ref 0–0.9)
HDLC SERPL-MCNC: 33 MG/DL
HGB BLD-MCNC: 13.5 G/DL (ref 11.1–15.9)
LDLC SERPL CALC-MCNC: 109 MG/DL (ref 0–99)
MCH RBC QN AUTO: 28.1 PG (ref 26.6–33)
MCHC RBC AUTO-ENTMCNC: 33.7 G/DL (ref 31.5–35.7)
MCV RBC AUTO: 83 FL (ref 79–97)
PLATELET # BLD AUTO: 330 X10E3/UL (ref 150–379)
POTASSIUM SERPL-SCNC: 4.2 MMOL/L (ref 3.5–5.2)
PROT SERPL-MCNC: 8 G/DL (ref 6–8.5)
RBC # BLD AUTO: 4.81 X10E6/UL (ref 3.77–5.28)
SODIUM SERPL-SCNC: 139 MMOL/L (ref 134–144)
TRIGL SERPL-MCNC: 151 MG/DL (ref 0–149)
TSH SERPL DL<=0.005 MIU/L-ACNC: 1.59 UIU/ML (ref 0.45–4.5)
VLDLC SERPL CALC-MCNC: 30 MG/DL (ref 5–40)
WBC # BLD AUTO: 6 X10E3/UL (ref 3.4–10.8)

## 2019-06-06 DIAGNOSIS — L30.1 DYSHIDROTIC ECZEMA: ICD-10-CM

## 2019-06-06 RX ORDER — LISINOPRIL AND HYDROCHLOROTHIAZIDE 20; 25 MG/1; MG/1
TABLET ORAL
Qty: 90 TAB | Refills: 1 | Status: SHIPPED | OUTPATIENT
Start: 2019-06-06 | End: 2019-12-12 | Stop reason: SDUPTHER

## 2019-08-15 DIAGNOSIS — F41.9 ANXIETY: ICD-10-CM

## 2019-08-15 RX ORDER — ALPRAZOLAM 0.5 MG/1
TABLET ORAL
Qty: 30 TAB | Refills: 1 | Status: SHIPPED | OUTPATIENT
Start: 2019-08-15 | End: 2020-02-20 | Stop reason: SDUPTHER

## 2019-12-12 DIAGNOSIS — L30.1 DYSHIDROTIC ECZEMA: ICD-10-CM

## 2019-12-13 RX ORDER — LISINOPRIL AND HYDROCHLOROTHIAZIDE 20; 25 MG/1; MG/1
TABLET ORAL
Qty: 90 TAB | Refills: 0 | Status: SHIPPED | OUTPATIENT
Start: 2019-12-13 | End: 2020-03-10

## 2020-02-20 ENCOUNTER — OFFICE VISIT (OUTPATIENT)
Dept: FAMILY MEDICINE CLINIC | Age: 65
End: 2020-02-20

## 2020-02-20 VITALS
HEIGHT: 64 IN | BODY MASS INDEX: 33.67 KG/M2 | WEIGHT: 197.2 LBS | HEART RATE: 80 BPM | TEMPERATURE: 97.9 F | RESPIRATION RATE: 16 BRPM | OXYGEN SATURATION: 98 % | DIASTOLIC BLOOD PRESSURE: 76 MMHG | SYSTOLIC BLOOD PRESSURE: 138 MMHG

## 2020-02-20 DIAGNOSIS — F41.9 ANXIETY: ICD-10-CM

## 2020-02-20 DIAGNOSIS — G62.9 NEUROPATHY: ICD-10-CM

## 2020-02-20 DIAGNOSIS — M16.11 PRIMARY OSTEOARTHRITIS OF RIGHT HIP: ICD-10-CM

## 2020-02-20 DIAGNOSIS — I10 ESSENTIAL HYPERTENSION: ICD-10-CM

## 2020-02-20 DIAGNOSIS — Z00.00 WELL ADULT EXAM: Primary | ICD-10-CM

## 2020-02-20 DIAGNOSIS — E78.00 HYPERCHOLESTEROLEMIA: ICD-10-CM

## 2020-02-20 RX ORDER — DICLOFENAC SODIUM 10 MG/G
2 GEL TOPICAL
Qty: 100 G | Refills: 1 | Status: SHIPPED | OUTPATIENT
Start: 2020-02-20 | End: 2020-02-21 | Stop reason: SDUPTHER

## 2020-02-20 RX ORDER — ALPRAZOLAM 0.5 MG/1
TABLET ORAL
Qty: 30 TAB | Refills: 1 | Status: SHIPPED | OUTPATIENT
Start: 2020-02-20 | End: 2020-02-21 | Stop reason: SDUPTHER

## 2020-02-20 RX ORDER — TRAMADOL HYDROCHLORIDE 50 MG/1
50 TABLET ORAL
Qty: 28 TAB | Refills: 0 | Status: SHIPPED | OUTPATIENT
Start: 2020-02-20 | End: 2020-02-21 | Stop reason: SDUPTHER

## 2020-02-20 NOTE — PROGRESS NOTES
Chief Complaint   Patient presents with    Complete Physical     Annual   1. Have you been to the ER, urgent care clinic since your last visit? Hospitalized since your last visit? No    2. Have you seen or consulted any other health care providers outside of the [address] [place] [address] since your last visit? Include any pap smears or colon screening.  No   Discuss Right Hip pain

## 2020-02-20 NOTE — PROGRESS NOTES
Subjective:     Chief Complaint   Patient presents with    Complete Physical     Annual      She  is a 75 y.o. female who presents for evaluation of:  CPE. Plans to retire soon    Colonoscopy - UTD  Seeing Dr. Timmy Lujan at [facility] - UTD on paps and mammos    Hyperlipidemia & HTN  Pt is doing well on current meds with no medication side effects noted  No new myalgias, no joint pains, no weakness  No TIA's, no chest pain on exertion, no dyspnea on exertion, no swelling of ankles. Exercising - Minimal, pain in knees, back, and right hip  Dieting - Yes  Smoking - No    Lab Results   Component Value Date/Time    Cholesterol, total 172 03/12/2019 09:29 AM    HDL Cholesterol 33 (L) 03/12/2019 09:29 AM    LDL, calculated 109 (H) 03/12/2019 09:29 AM    Triglyceride 151 (H) 03/12/2019 09:29 AM     ROS:  Constitutional: negative for fevers, chills and fatigue  Eyes: negative for visual disturbance  Ears, nose, mouth, throat, and face: negative for hearing loss and sore throat  Respiratory: negative for cough or dyspnea on exertion  Cardiovascular: negative for chest pain, dyspnea, palpitations, fatigue  Gastrointestinal: negative for nausea, vomiting, change in bowel habits, diarrhea and abdominal pain  Genitourinary:negative for frequency and dysuria  Integument/breast: Seeing Derm for palmoplantar keratoderma-overall seems much improved  Hematologic/lymphatic: negative for easy bruising and bleeding  Musculoskeletal: R hip pain, no injury. X-rays form 2018 c/w R hip DJD. Not interested in pursuing any surgical interventions. Neurological: negative for headaches and dizziness, does endorse some mild neuropathy in hands and feet especially at night. Has never had issues with this in the past.  Denies any injuries to her neck/back. Not taking any medication for this. Has not noticed any specific triggers. Pain seems to come and go.   Behavioral/Psych: negative for anxiety and depression     Objective:     Vitals: 02/20/20 1348   BP: 138/76   Pulse: 80   Resp: 16   Temp: 97.9 °F (36.6 °C)   TempSrc: Oral   SpO2: 98%   Weight: 197 lb 3.2 oz (89.4 kg)   Height: 5' 4\" (1.626 m)     Physical Examination:  General appearance - alert, well appearing, and in no distress  Eyes - sclera anicteric  Ears - nml TMs bilat  Nose - normal and patent, no erythema, discharge or polyps  Mouth - mucous membranes moist, pharynx normal without lesions  Neck - supple, no significant adenopathy  Lymphatics - no palpable lymphadenopathy, no hepatosplenomegaly  Chest - clear to auscultation, no wheezes, rales or rhonchi, symmetric air entry  Heart - normal rate, regular rhythm, normal S1, S2, no murmurs, rubs, clicks or gallops  Abdomen - soft, nontender, nondistended, no masses or organomegaly  Breasts & Pelvic - exam declined by the patient as done by OB/GYN  Neurological - alert, oriented, normal speech, no focal findings or movement disorder noted  Musculoskeletal - no joint tenderness, deformity or swelling  Extr - trace edema  Skin - much improved keratoderma with full resolution on hands bilat  Psych - nml mood and affect    Past Medical History:   Diagnosis Date    Anxiety state, unspecified     Depression     Diverticulosis     DJD (Degenerative Joint Disease) of Knee     GERD (gastroesophageal reflux disease)     Hypercholesterolemia     Hypertension     Palmoplantar keratoderma     Prepyloric ulcer 2008     Past Surgical History:   Procedure Laterality Date    ENDOSCOPY, COLON, DIAGNOSTIC      due 2015     Current Outpatient Medications on File Prior to Visit   Medication Sig Dispense Refill    lisinopril-hydroCHLOROthiazide (PRINZIDE, ZESTORETIC) 20-25 mg per tablet TAKE 1 TABLET BY MOUTH ONCE DAILY 90 Tab 0    meloxicam (MOBIC) 15 mg tablet Take 1 Tab by mouth daily (after breakfast). Take x 1 week and then stop. May use daily after this as needed.  30 Tab 0    MV-MN/FOLIC ACID/CALCIUM/VIT K (ONE-A-DAY WOMEN'S 50 PLUS PO) Take 1 Tab by mouth daily.  urea 40 % lotion Apply  to affected area nightly. (Patient taking differently: Apply  to affected area nightly as needed.) 236.6 mL 0    influenza vaccine 2019-20, 18 yrs+,, PF, (FLUBLOK QUAD [id number], PF,) syrg injection Flublok Quad [id number] (PF) 180 mcg (45 mcg x 4)/0.5 mL IM syringe   inject 0.5 milliliters intramuscularly      [DISCONTINUED] ALPRAZolam (XANAX) 0.5 mg tablet TAKE 1 TABLET BY MOUTH ONCE DAILY AT NIGHT AS NEEDED FOR  ANXIETY 30 Tab 1    liraglutide (SAXENDA) 3 mg/0.5 mL (18 mg/3 mL) pen Take 0.6 mg once daily for one week; increase by 0.6 mg daily at weekly intervals to a target dose of 3 mg once daily 1 Adjustable Dose Pre-filled Pen Syringe 0     No current facility-administered medications on file prior to visit. Assessment/ Plan:   Diagnoses and all orders for this visit:    1. Well adult exam  -     CBC W/O DIFF  -     HEMOGLOBIN A1C WITH EAG  -     LIPID PANEL  -     METABOLIC PANEL, COMPREHENSIVE  -     TSH 3RD GENERATION  -     URINALYSIS DIP STICK AUTO W/O MICRO    2. Essential hypertension - controlled  -     METABOLIC PANEL, COMPREHENSIVE    3. Hypercholesterolemia - stable  -     HEMOGLOBIN A1C WITH EAG  -     LIPID PANEL  -     METABOLIC PANEL, COMPREHENSIVE  -     TSH 3RD GENERATION    4. BMI 33.0-33.9,adult - working on this, unable to coco Phentermine d/t insomnia. Cost of Saxenda too high. Working mostly on diet and exercise  Discussed the patient's BMI. The BMI follow up plan is as follows:   dietary management education, guidance, and counseling  encourage exercise  monitor weight  prescribed dietary intake  -     HEMOGLOBIN A1C WITH EAG  -     LIPID PANEL  -     TSH 3RD GENERATION    5. Primary osteoarthritis of right hip-discussed treatment of this ongoing issue especially during flares. Will try diclofenac gel to be used regularly and can use tramadol for severe pain. Also focused on weight loss to help decrease pain.   She is not interested in seeing Ortho at this point. We discussed potentially having ultrasound-guided steroid injection to help with pain. -     traMADol (ULTRAM) 50 mg tablet; Take 1 Tab by mouth every six (6) hours as needed for Pain for up to 7 days. Max Daily Amount: 200 mg.  -     diclofenac (VOLTAREN) 1 % gel; Apply 2 g to affected area four (4) times daily as needed for Pain. 6. Neuropathy-very mild symptoms but will rule out B12 deficiency as cause. Wonder if this is actually related to previous issues with palmar plantar keratoderma  -     VITAMIN B12 & FOLATE    7. Anxiety-generally well controlled. Using Xanax very sparingly  -     ALPRAZolam (XANAX) 0.5 mg tablet; TAKE 1 TABLET BY MOUTH ONCE DAILY AT NIGHT AS NEEDED FOR  ANXIETY    I have discussed the diagnosis with the patient and the intended plan as seen in the above orders. The patient has received an after-visit summary and questions were answered concerning future plans. I have discussed medication side effects and warnings with the patient as well. The patient verbalizes understanding and agreement with the plan. Follow-up and Dispositions    · Return in about 6 months (around 8/20/2020), or if symptoms worsen or fail to improve.

## 2020-02-21 DIAGNOSIS — F41.9 ANXIETY: ICD-10-CM

## 2020-02-21 DIAGNOSIS — M16.11 PRIMARY OSTEOARTHRITIS OF RIGHT HIP: ICD-10-CM

## 2020-02-21 LAB
ALBUMIN SERPL-MCNC: 4.2 G/DL (ref 3.8–4.8)
ALBUMIN/GLOB SERPL: 1.2 {RATIO} (ref 1.2–2.2)
ALP SERPL-CCNC: 94 IU/L (ref 39–117)
ALT SERPL-CCNC: 27 IU/L (ref 0–32)
APPEARANCE UR: CLEAR
AST SERPL-CCNC: 31 IU/L (ref 0–40)
BILIRUB SERPL-MCNC: <0.2 MG/DL (ref 0–1.2)
BILIRUB UR QL STRIP: NEGATIVE
BUN SERPL-MCNC: 13 MG/DL (ref 8–27)
BUN/CREAT SERPL: 14 (ref 12–28)
CALCIUM SERPL-MCNC: 9.5 MG/DL (ref 8.7–10.3)
CHLORIDE SERPL-SCNC: 97 MMOL/L (ref 96–106)
CHOLEST SERPL-MCNC: 175 MG/DL (ref 100–199)
CO2 SERPL-SCNC: 23 MMOL/L (ref 20–29)
COLOR UR: YELLOW
CREAT SERPL-MCNC: 0.96 MG/DL (ref 0.57–1)
ERYTHROCYTE [DISTWIDTH] IN BLOOD BY AUTOMATED COUNT: 13.7 % (ref 11.7–15.4)
EST. AVERAGE GLUCOSE BLD GHB EST-MCNC: 134 MG/DL
GLOBULIN SER CALC-MCNC: 3.4 G/DL (ref 1.5–4.5)
GLUCOSE SERPL-MCNC: 115 MG/DL (ref 65–99)
GLUCOSE UR QL: NEGATIVE
HBA1C MFR BLD: 6.3 % (ref 4.8–5.6)
HCT VFR BLD AUTO: 41.2 % (ref 34–46.6)
HDLC SERPL-MCNC: 27 MG/DL
HGB BLD-MCNC: 14.2 G/DL (ref 11.1–15.9)
HGB UR QL STRIP: NEGATIVE
KETONES UR QL STRIP: NEGATIVE
LDLC SERPL CALC-MCNC: 87 MG/DL (ref 0–99)
LEUKOCYTE ESTERASE UR QL STRIP: NEGATIVE
MCH RBC QN AUTO: 28.5 PG (ref 26.6–33)
MCHC RBC AUTO-ENTMCNC: 34.5 G/DL (ref 31.5–35.7)
MCV RBC AUTO: 83 FL (ref 79–97)
MICRO URNS: NORMAL
NITRITE UR QL STRIP: NEGATIVE
PH UR STRIP: 7 [PH] (ref 5–7.5)
PLATELET # BLD AUTO: 363 X10E3/UL (ref 150–450)
POTASSIUM SERPL-SCNC: 4 MMOL/L (ref 3.5–5.2)
PROT SERPL-MCNC: 7.6 G/DL (ref 6–8.5)
PROT UR QL STRIP: NEGATIVE
RBC # BLD AUTO: 4.99 X10E6/UL (ref 3.77–5.28)
SODIUM SERPL-SCNC: 139 MMOL/L (ref 134–144)
SP GR UR: 1.02 (ref 1–1.03)
TRIGL SERPL-MCNC: 306 MG/DL (ref 0–149)
TSH SERPL DL<=0.005 MIU/L-ACNC: 1.29 UIU/ML (ref 0.45–4.5)
UROBILINOGEN UR STRIP-MCNC: 0.2 MG/DL (ref 0.2–1)
VLDLC SERPL CALC-MCNC: 61 MG/DL (ref 5–40)
WBC # BLD AUTO: 7.7 X10E3/UL (ref 3.4–10.8)

## 2020-02-21 RX ORDER — TRAMADOL HYDROCHLORIDE 50 MG/1
50 TABLET ORAL
Qty: 28 TAB | Refills: 0 | Status: SHIPPED | OUTPATIENT
Start: 2020-02-21 | End: 2020-02-28

## 2020-02-21 RX ORDER — DICLOFENAC SODIUM 10 MG/G
2 GEL TOPICAL
Qty: 100 G | Refills: 1 | Status: SHIPPED | OUTPATIENT
Start: 2020-02-21 | End: 2020-07-28 | Stop reason: ALTCHOICE

## 2020-02-21 RX ORDER — ALPRAZOLAM 0.5 MG/1
TABLET ORAL
Qty: 30 TAB | Refills: 1 | Status: SHIPPED | OUTPATIENT
Start: 2020-02-21 | End: 2020-12-29 | Stop reason: SDUPTHER

## 2020-02-24 DIAGNOSIS — E78.00 HYPERCHOLESTEROLEMIA: Primary | ICD-10-CM

## 2020-02-24 RX ORDER — ATORVASTATIN CALCIUM 20 MG/1
20 TABLET, FILM COATED ORAL DAILY
Qty: 30 TAB | Refills: 5 | Status: SHIPPED | OUTPATIENT
Start: 2020-02-24 | End: 2022-08-16

## 2020-03-09 ENCOUNTER — OFFICE VISIT (OUTPATIENT)
Dept: ORTHOPEDIC SURGERY | Age: 65
End: 2020-03-09

## 2020-03-09 ENCOUNTER — HOSPITAL ENCOUNTER (OUTPATIENT)
Dept: GENERAL RADIOLOGY | Age: 65
Discharge: HOME | End: 2020-03-09
Payer: COMMERCIAL

## 2020-03-09 VITALS
OXYGEN SATURATION: 97 % | HEART RATE: 87 BPM | SYSTOLIC BLOOD PRESSURE: 122 MMHG | DIASTOLIC BLOOD PRESSURE: 83 MMHG | HEIGHT: 64 IN | BODY MASS INDEX: 33.12 KG/M2 | WEIGHT: 194 LBS

## 2020-03-09 DIAGNOSIS — M25.551 RIGHT HIP PAIN: ICD-10-CM

## 2020-03-09 DIAGNOSIS — M16.11 UNILATERAL PRIMARY OSTEOARTHRITIS, RIGHT HIP: Primary | ICD-10-CM

## 2020-03-09 DIAGNOSIS — M25.551 RIGHT HIP PAIN: Primary | ICD-10-CM

## 2020-03-09 PROCEDURE — 73502 X-RAY EXAM HIP UNI 2-3 VIEWS: CPT

## 2020-03-09 RX ORDER — BETAMETHASONE SODIUM PHOSPHATE AND BETAMETHASONE ACETATE 3; 3 MG/ML; MG/ML
6 INJECTION, SUSPENSION INTRA-ARTICULAR; INTRALESIONAL; INTRAMUSCULAR; SOFT TISSUE ONCE
Qty: 1 ML | Refills: 0
Start: 2020-03-09 | End: 2020-03-09

## 2020-03-09 NOTE — PROGRESS NOTES
Identified pt with two pt identifiers (name and ). Reviewed chart in preparation for visit and have obtained necessary documentation. Emir Beth is a 75 y.o. female  Chief Complaint   Patient presents with    Hip Pain     RT hip      Visit Vitals  /83 (BP 1 Location: Left arm, BP Patient Position: Sitting)   Pulse 87   Ht 5' 4\" (1.626 m)   Wt 194 lb (88 kg)   SpO2 97%   BMI 33.30 kg/m²     1. Have you been to the ER, urgent care clinic since your last visit? Hospitalized since your last visit? No    2. Have you seen or consulted any other health care providers outside of the [address] [place] [address] since your last visit? Include any pap smears or colon screening.  No

## 2020-03-09 NOTE — PROGRESS NOTES
3/9/2020    Chief Complaint: Right hip pain    HPI: This is a 75 y.o. female who complains of right hip pain which is activity dependent. The patient has had activity dependent pain for years. The patient has tried activity modification, no physical therapy, injections have not been attempted. The pain is in the groin, buttock, and thigh, it is severe in intensity. The pain is in the groin and causes some limitation in the normal activities of daily living. The patient denies any numbness, weakness, tingling, fevers, chills, nausea, vomiting, fevers, chills, nausea, vomiting. Past Medical History:   Diagnosis Date    Anxiety state, unspecified     Depression     Diverticulosis     DJD (Degenerative Joint Disease) of Knee     GERD (gastroesophageal reflux disease)     Hypercholesterolemia     Hypertension     Palmoplantar keratoderma     Prepyloric ulcer 2008       Past Surgical History:   Procedure Laterality Date    ENDOSCOPY, COLON, DIAGNOSTIC      due 2015       Current Outpatient Medications on File Prior to Visit   Medication Sig Dispense Refill    atorvastatin (LIPITOR) 20 mg tablet Take 1 Tab by mouth daily. 30 Tab 5    diclofenac (VOLTAREN) 1 % gel Apply 2 g to affected area four (4) times daily as needed for Pain. 100 g 1    ALPRAZolam (XANAX) 0.5 mg tablet TAKE 1 TABLET BY MOUTH ONCE DAILY AT NIGHT AS NEEDED FOR  ANXIETY 30 Tab 1    influenza vaccine 2019-20, 18 yrs+,, PF, (FLUBLOK QUAD [id number], PF,) syrg injection Flublok Quad [id number] (PF) 180 mcg (45 mcg x 4)/0.5 mL IM syringe   inject 0.5 milliliters intramuscularly      lisinopril-hydroCHLOROthiazide (PRINZIDE, ZESTORETIC) 20-25 mg per tablet TAKE 1 TABLET BY MOUTH ONCE DAILY 90 Tab 0    MV-MN/FOLIC ACID/CALCIUM/VIT K (ONE-A-DAY WOMEN'S 50 PLUS PO) Take 1 Tab by mouth daily.  meloxicam (MOBIC) 15 mg tablet Take 1 Tab by mouth daily (after breakfast). Take x 1 week and then stop. May use daily after this as needed.  27 Tab 0    urea 40 % lotion Apply  to affected area nightly. (Patient taking differently: Apply  to affected area nightly as needed.) 236.6 mL 0     No current facility-administered medications on file prior to visit. No Known Allergies    Family History   Problem Relation Age of Onset    Dementia Mother    [facility] Cancer Father         cancer    Hypertension Father     Heart Disease Father     Other Brother 62        [facility]    Cancer Sister 7        Brain Cancer       Social History     Socioeconomic History    Marital status: LIFE PARTNER     Spouse name: Not on file    Number of children: Not on file    Years of education: Not on file    Highest education level: Not on file   Tobacco Use    Smoking status: Former Smoker     Packs/day: 0.50     Years: 5.00     Pack years: 2.50     Last attempt to quit: 2000     Years since quittin.0    Smokeless tobacco: Never Used   Substance and Sexual Activity    Alcohol use: No    Drug use: No         Review of Systems:       General: Denies headache, lethargy, fever, weight loss  Ears/Nose/Throat: Denies ear discharge, drainage, nosebleeds, hoarse voice, dental problems  Cardiovascular: Denies chest pain, shortness of breath  Lungs: Denies chest pain, breathing problems, wheezing, pneumonia  Stomach: Denies stomach pain, heartburn, constipation, irritable bowel  Skin: Denies rash, sores, open wounds  Musculoskeletal: Admits to joint pain and swelling, this pain is severe and causes difficulty walking. This pain is in the groin on the right side, it is made better by rest.   There is decreased mobility, and moderate difficulty doing normal activities of daily living secondary to the pain. Genitourinary: Denies dysuria, hematuria, polyuria  Gastrointestinal: Denies constipation, obstipation, diarrhea  Neurological: Denies changes in sight, smell, hearing, taste, seizures. Denies loss of consciousness.   Psychiatric: Denies depression, sleep pattern changes, anxiety, change in personality  Endocrine: Denies mood swings, heat or cold intolerance  Hematologic/Lymphatic: Denies anemia, purpura, petechia  Allergic/Immunologic: Denies swelling of throat, pain or swelling at lymph nodes      Physical Examination:      General: AOX3, no apparent distress  Psychiatric: mood and affect appropriate  Lungs: clear to auscultation bilaterally  Heart: regular rate and rhythm  Abdomen: no guarding  Head: normocephalic, atraumatic  Skin: No significant abnormalities, good turgor  Sensation intact to light touch: L1-S1 dermatomes  Muscular exam: 5/5 strength in all major muscle groups unless noted in specialty exam.    Extremities      Left upper extremity: Full active and passive range of motion without pain, deformity, no open wound, strength 5/5 in all major muscle groups. Right upper extremity: Full active and passive range of motion without pain, deformity, no open wound, strength 5/5 in all major muscle groups. Left lower extremity: Full active and passive range of motion without pain, deformity, no open wound, strength 5/5 in all major muscle groups. Right lower extremity:  No deformity is noted. Circumduction of the hip causes pain in the buttock, thigh, and pain in the groin, reproductive of the chief complaint. Range of motion is limited, with a positive impingement sign. ADRIÁN test is positive. Gait is antalgic. Mild tenderness to palpation on the lateral aspect of the hip. Sensation is intact to light touch in the L1-S1 dermatomes. Skin is intact about the hip. Hip flexion and abduction strength is 5/5, hip extension and knee extension as well as tibialis anterior and EHL/GCS are 5/5 strength. Diagnostics:    Pertinent Xrays:  Xrays are available of the right hip, they indicate severe, bone on bone osteoarthritis of the femoroacetabular joint, no significant other findings, no other osseus abnormalities, fractures, or dislocations. Assessment: Unilateral Primary Osteoarthritis, Right Hip    Plan: This patient does have symptomatic hip osteoarthritis. We did discuss the general management of osteoarthritis, which is largely nonsurgical.   We discussed that activity modification, weight loss, weight bearing exercises, physical therapy, over the counter medications, prescription medications, ambulatory aids, intra-articular injections, and pain management modalities are the treatment of choice. Only once these fail, would we consider surgery. The patient stated their understanding of the pathology, as well as their choices. We decided to proceed with injections. Procedures:  PROCEDURE NOTE:    After consent was obtained, the right hip was visualized under direct ultrasound guidance, once I had confirmation of direct visualization of the head neck junction, skin at the anterior lateral hip was prepped with chlorhexidine. Under direct visualization, 1% lidocaine was injected into the subcutaneous tissues as well as into the capsule of the hip. Needle remained in place 6 mg of betamethasone as well as 1% lidocaine were injected into the hip joint itself, there was good filling of the capsule itself as noted by direct visualization. Images were saved. Once this was completed, the needle was extracted, sterile dressing was applied. The patient tolerated well. Postinjection instructions were given. Follow up will be 1 week        Ms. Pieter Clemons has a reminder for a \"due or due soon\" health maintenance. I have asked that she contact her primary care provider for follow-up on this health maintenance.

## 2020-03-16 ENCOUNTER — OFFICE VISIT (OUTPATIENT)
Dept: ORTHOPEDIC SURGERY | Age: 65
End: 2020-03-16

## 2020-03-16 VITALS
SYSTOLIC BLOOD PRESSURE: 109 MMHG | DIASTOLIC BLOOD PRESSURE: 64 MMHG | HEIGHT: 64 IN | RESPIRATION RATE: 16 BRPM | BODY MASS INDEX: 33.12 KG/M2 | HEART RATE: 78 BPM | OXYGEN SATURATION: 98 % | WEIGHT: 194 LBS | TEMPERATURE: 97.9 F

## 2020-03-16 DIAGNOSIS — M16.11 UNILATERAL PRIMARY OSTEOARTHRITIS, RIGHT HIP: Primary | ICD-10-CM

## 2020-03-16 NOTE — PROGRESS NOTES
Identified pt with two pt identifiers(name and ). Reviewed record in preparation for visit and have obtained necessary documentation. Chief Complaint   Patient presents with    Hip Pain     f/u right hip pain        Visit Vitals  /64 (BP 1 Location: Left arm, BP Patient Position: Sitting)   Pulse 78   Temp 97.9 °F (36.6 °C) (Oral)   Resp 16   Ht 5' 4\" (1.626 m)   Wt 194 lb (88 kg)   SpO2 98%   BMI 33.30 kg/m²       Med Reconciliation: Completed    Coordination of Care Questionnaire:  :   1) Have you been to an emergency room, urgent care, or hospitalized since your last visit? If yes, where when, and reason for visit? No       2. Have seen or consulted any other health care provider since your last visit? If yes, where when, and reason for visit? No       3) Do you have an Advanced Directive/ Living Will in place? No  If yes, do we have a copy on file   If no, would you like information     Patient is accompanied by self I have received verbal consent from Trina Singh to discuss any/all medical information while they are present in the room.

## 2020-03-17 NOTE — PROGRESS NOTES
3/16/2020      CC: Right hip pain    HPI:      This is a 75y.o. year old female who presents for a follow up visit. The patient was last seen and diagnosed with osteoarthritis. The patient's treatments since the most recent visit have comprised of a hip injection last week. The patient has had moderate relief of the chief complaint. Additionally, she feels somewhat better, but still limited. PMH:  Past Medical History:   Diagnosis Date    Anxiety state, unspecified     Depression     Diverticulosis     DJD (Degenerative Joint Disease) of Knee     GERD (gastroesophageal reflux disease)     Hypercholesterolemia     Hypertension     Palmoplantar keratoderma     Prepyloric ulcer 2008       PSxHx:  Past Surgical History:   Procedure Laterality Date    ENDOSCOPY, COLON, DIAGNOSTIC      due 2015       Meds:    Current Outpatient Medications:     lisinopril-hydroCHLOROthiazide (PRINZIDE, ZESTORETIC) 20-25 mg per tablet, Take 1 tablet by mouth once daily, Disp: 90 Tab, Rfl: 0    atorvastatin (LIPITOR) 20 mg tablet, Take 1 Tab by mouth daily. , Disp: 30 Tab, Rfl: 5    diclofenac (VOLTAREN) 1 % gel, Apply 2 g to affected area four (4) times daily as needed for Pain., Disp: 100 g, Rfl: 1    ALPRAZolam (XANAX) 0.5 mg tablet, TAKE 1 TABLET BY MOUTH ONCE DAILY AT NIGHT AS NEEDED FOR  ANXIETY, Disp: 30 Tab, Rfl: 1    meloxicam (MOBIC) 15 mg tablet, Take 1 Tab by mouth daily (after breakfast). Take x 1 week and then stop. May use daily after this as needed. , Disp: 30 Tab, Rfl: 0    MV-MN/FOLIC ACID/CALCIUM/VIT K (ONE-A-DAY WOMEN'S 50 PLUS PO), Take 1 Tab by mouth daily. , Disp: , Rfl:     urea 40 % lotion, Apply  to affected area nightly.  (Patient taking differently: Apply  to affected area nightly as needed.), Disp: 236.6 mL, Rfl: 0    All:  No Known Allergies    Social Hx:  Social History     Socioeconomic History    Marital status: LIFE PARTNER     Spouse name: Not on file    Number of children: Not on file    Years of education: Not on file    Highest education level: Not on file   Tobacco Use    Smoking status: Former Smoker     Packs/day: 0.50     Years: 5.00     Pack years: 2.50     Last attempt to quit: 2000     Years since quittin.0    Smokeless tobacco: Never Used   Substance and Sexual Activity    Alcohol use: No    Drug use: No       Family Hx:  Family History   Problem Relation Age of Onset    Dementia Mother     Cancer Father         cancer    Hypertension Father     Heart Disease Father     Other Brother 62        [facility]    Cancer Sister 7        Brain Cancer         Review of Systems:       General: Denies headache, lethargy, fever, weight loss  Ears/Nose/Throat: Denies ear discharge, drainage, nosebleeds, hoarse voice, dental problems  Cardiovascular: Denies chest pain, shortness of breath  Lungs: Denies chest pain, breathing problems, wheezing, pneumonia  Stomach: Denies stomach pain, heartburn, constipation, irritable bowel  Skin: Denies rash, sores, open wounds  Musculoskeletal: right hip pain  Genitourinary: Denies dysuria, hematuria, polyuria  Gastrointestinal: Denies constipation, obstipation, diarrhea  Neurological: Denies changes in sight, smell, hearing, taste, seizures. Denies loss of consciousness.   Psychiatric: Denies depression, sleep pattern changes, anxiety, change in personality  Endocrine: Denies mood swings, heat or cold intolerance  Hematologic/Lymphatic: Denies anemia, purpura, petechia  Allergic/Immunologic: Denies swelling of throat, pain or swelling at lymph nodes      Physical Examination:    Visit Vitals  /64 (BP 1 Location: Left arm, BP Patient Position: Sitting)   Pulse 78   Temp 97.9 °F (36.6 °C) (Oral)   Resp 16   Ht 5' 4\" (1.626 m)   Wt 194 lb (88 kg)   SpO2 98%   BMI 33.30 kg/m²        General: AOX3, no apparent distress  Psychiatric: mood and affect appropriate  Lungs: breathing is symmetric and unlabored bilaterally  Heart: regular rate and rhythm  Abdomen: no guarding  Head: normocephalic, atraumatic  Skin: No significant abnormalities, good turgor  Sensation intact to light touch: C5-T1 dermatomes  Muscular exam: 5/5 strength in all major muscle groups unless noted in specialty exam.    Extremities      Right upper extremity: no exam  Left upper extremity:  No exam  Right lower extremity: No deformity is noted. Circumduction of the hip causes significant pain in the groin, reproductive of the chief complaint. Range of motion is limited, with a positive impingement sign. Gait is antalgic. Sensation is intact to light touch in the L1-S1 dermatomes. Skin is intact about the hip. Hip flexion and abduction strength is 4+/5, hip extension and knee extension as well as tibialis anterior and EHL/GCS are 5/5 strength. Left lower extremity:  No gross deformity. No restriction to range of motion of the hip, knee, ankle. Muscle bulk is appropriate without wasting. Sensation is intact to light touch in the L1-S1 dermatomes. Capillary refill is less than 2 seconds in the fingers. Strength testing is 5/5 at the major muscle groups of the hip, knee, ankle. Diagnostics:    Pertinent Diagnostics: none today    Assessment: OA right hip  Plan: This patient feels like she is coming close to her limits from injections. She will try tramadol for now through her primary. She will see if she can tolerate this, and if she cannot, she is aware that a total hip arthroplasty could be helpful long term for her. She will call us if she desires this as treatment.   We did discuss the risks of surgery which include but are not limited to infection, nerve or blood vessel damage, failure of fixation, failure of any possible implant, need for reoperation, postoperative pain and swelling, intra-or postoperative fracture, postoperative dislocation, leg length inequality, need for reoperation, implant failure, death, disability, organ dysfunction, wound healing issues, DVT, PE, and the need for further procedures. The patient did freely state their understanding and satisfaction with our discussion. We will proceed after medical clearances. Ms. Leo Xiong has a reminder for a \"due or due soon\" health maintenance. I have asked that she contact her primary care provider for follow-up on this health maintenance.

## 2020-06-09 DIAGNOSIS — I10 ESSENTIAL HYPERTENSION: ICD-10-CM

## 2020-06-09 RX ORDER — LISINOPRIL AND HYDROCHLOROTHIAZIDE 20; 25 MG/1; MG/1
TABLET ORAL
Qty: 90 TAB | Refills: 0 | Status: SHIPPED | OUTPATIENT
Start: 2020-06-09 | End: 2020-09-14

## 2020-07-09 ENCOUNTER — OFFICE VISIT (OUTPATIENT)
Dept: ORTHOPEDIC SURGERY | Age: 65
End: 2020-07-09

## 2020-07-09 VITALS
HEART RATE: 80 BPM | DIASTOLIC BLOOD PRESSURE: 83 MMHG | HEIGHT: 64 IN | TEMPERATURE: 96.3 F | OXYGEN SATURATION: 98 % | BODY MASS INDEX: 32.44 KG/M2 | WEIGHT: 190 LBS | SYSTOLIC BLOOD PRESSURE: 137 MMHG

## 2020-07-09 DIAGNOSIS — M16.11 UNILATERAL PRIMARY OSTEOARTHRITIS, RIGHT HIP: Primary | ICD-10-CM

## 2020-07-09 NOTE — PROGRESS NOTES
7/9/2020    Chief Complaint: Right hip pain    HPI: This is a 77 y. o.female who complains of right hip pain which is activity dependent. The patient has had activity dependent pain for years. The patient has tried activity modification, she has tried physical therapy, injections have been attempted without success. The pain is in the groin , it is severe in intensity. The patient fears falling, walks with a limp, and feels as though all nonoperative management has failed. The patient denies any numbness, weakness, tingling, fevers, chills, nausea, vomiting, fevers, chills, nausea, vomiting. Past Medical History:   Diagnosis Date    Anxiety state, unspecified     Depression     Diverticulosis     DJD (Degenerative Joint Disease) of Knee     GERD (gastroesophageal reflux disease)     Hypercholesterolemia     Hypertension     Palmoplantar keratoderma     Prepyloric ulcer 2008       Past Surgical History:   Procedure Laterality Date    ENDOSCOPY, COLON, DIAGNOSTIC      due 2015       Current Outpatient Medications on File Prior to Visit   Medication Sig Dispense Refill    lisinopril-hydroCHLOROthiazide (PRINZIDE, ZESTORETIC) 20-25 mg per tablet Take 1 tablet by mouth once daily 90 Tab 0    atorvastatin (LIPITOR) 20 mg tablet Take 1 Tab by mouth daily. 30 Tab 5    diclofenac (VOLTAREN) 1 % gel Apply 2 g to affected area four (4) times daily as needed for Pain. 100 g 1    ALPRAZolam (XANAX) 0.5 mg tablet TAKE 1 TABLET BY MOUTH ONCE DAILY AT NIGHT AS NEEDED FOR  ANXIETY 30 Tab 1    meloxicam (MOBIC) 15 mg tablet Take 1 Tab by mouth daily (after breakfast). Take x 1 week and then stop. May use daily after this as needed. 30 Tab 0    MV-MN/FOLIC ACID/CALCIUM/VIT K (ONE-A-DAY WOMEN'S 50 PLUS PO) Take 1 Tab by mouth daily.  urea 40 % lotion Apply  to affected area nightly.  (Patient taking differently: Apply  to affected area nightly as needed.) 236.6 mL 0     No current facility-administered medications on file prior to visit. No Known Allergies    Family History   Problem Relation Age of Onset    Dementia Mother    [facility] Cancer Father         cancer    Hypertension Father     Heart Disease Father     Other Brother 62        [facility]    Cancer Sister 7        Brain Cancer       Social History     Socioeconomic History    Marital status: LIFE PARTNER     Spouse name: Not on file    Number of children: Not on file    Years of education: Not on file    Highest education level: Not on file   Tobacco Use    Smoking status: Former Smoker     Packs/day: 0.50     Years: 5.00     Pack years: 2.50     Last attempt to quit: 2000     Years since quittin.3    Smokeless tobacco: Never Used   Substance and Sexual Activity    Alcohol use: No    Drug use: No         Review of Systems:       General: Denies headache, lethargy, fever, weight loss  Ears/Nose/Throat: Denies ear discharge, drainage, nosebleeds, hoarse voice, dental problems  Cardiovascular: Denies chest pain, shortness of breath  Lungs: Denies chest pain, breathing problems, wheezing, pneumonia  Stomach: Denies stomach pain, heartburn, constipation, irritable bowel  Skin: Denies rash, sores, open wounds  Musculoskeletal: Admits to joint pain and swelling, this pain is sharp and causes difficulty walking. This pain is in the groin on the right side, it is severe, made better by rest.   This pain causes a limp, admits to stiffness of the joint, decreased mobility, and severe difficulty doing normal activities of daily living secondary to the pain. Genitourinary: Denies dysuria, hematuria, polyuria  Gastrointestinal: Denies constipation, obstipation, diarrhea  Neurological: Denies changes in sight, smell, hearing, taste, seizures. Denies loss of consciousness.   Psychiatric: Denies depression, sleep pattern changes, anxiety, change in personality  Endocrine: Denies mood swings, heat or cold intolerance  Hematologic/Lymphatic: Denies anemia, purpura, petechia  Allergic/Immunologic: Denies swelling of throat, pain or swelling at lymph nodes      Physical Examination:      General: AOX3, no apparent distress  Psychiatric: mood and affect appropriate  Lungs: clear to auscultation bilaterally  Heart: regular rate and rhythm  Abdomen: no guarding  Head: normocephalic, atraumatic  Skin: No significant abnormalities, good turgor  Sensation intact to light touch: L1-S1 dermatomes  Muscular exam: 5/5 strength in all major muscle groups unless noted in specialty exam.    Extremities      Left upper extremity: Full active and passive range of motion without pain, deformity, no open wound, strength 5/5 in all major muscle groups. Right upper extremity: Full active and passive range of motion without pain, deformity, no open wound, strength 5/5 in all major muscle groups. Left lower extremity: Full active and passive range of motion without pain, deformity, no open wound, strength 5/5 in all major muscle groups. Right lower extremity:  No deformity is noted. Circumduction of the hip causes significant pain in the groin, reproductive of the chief complaint. Range of motion is limited, with a positive impingement sign. ADRIÁN test causes some pain in the groin. Gait is antalgic. Slight tenderness to palpation on the lateral aspect of the hip. Sensation is intact to light touch in the L1-S1 dermatomes. Skin is intact about the hip. Hip flexion and abduction strength is 4+/5, hip extension and knee extension as well as tibialis anterior and EHL/GCS are 5/5 strength. Diagnostics:    Pertinent Xrays:  Pelvis and hip xrays indicate severe osteoarthritis of the right hip. There are osteophytes at the femoral neck and head, as well as subchondral sclerosis of the acetabular rim. Assessment: Unilateral primary osteoarthritis of the right hip  (M16.11)     Plan:   This patient and I did have a long discussion regarding the treatment options. Nonoperative management was discussed at length, continued pain control, physical therapy, injections, ambulatory aids, and weight loss. I did speak with the patient specifically that there are always some nonoperative options, and that surgery would be elective on their part. We did discuss the risks of surgery which include but are not limited to infection, nerve or blood vessel damage, femoral, lateral femoral cutaneous nerve injury, sciatic nerve injury, failure of fixation, failure of any possible implant, need for reoperation, postoperative pain and swelling, intra-or postoperative fracture, postoperative dislocation, leg length inequality, need for reoperation, implant failure, death, disability, organ dysfunction, wound healing issues, DVT, PE, and the need for further procedures. The patient did freely state their understanding and satisfaction with our discussion. We will proceed with a total hip arthroplasty after  medical clearances. The patient was counseled at length about the risks of mike Covid-19 during their perioperative period and any recovery window from their procedure. The patient was made aware that mike Covid-19  may worsen their prognosis for recovering from their procedure and lend to a higher morbidity and/or mortality risk. All material risks, benefits, and reasonable alternatives including postponing the procedure were discussed. The patient does  wish to proceed with the procedure at this time. Ms. Surya Hager has a reminder for a \"due or due soon\" health maintenance. I have asked that she contact her primary care provider for follow-up on this health maintenance.

## 2020-07-09 NOTE — PROGRESS NOTES
Identified pt with two pt identifiers (name and ). Reviewed chart in preparation for visit and have obtained necessary documentation. Indigo Lombardo is a 77 y.o. female  Chief Complaint   Patient presents with    Surgery     Discuss RT hip surgery     Visit Vitals  /83 (BP 1 Location: Left arm, BP Patient Position: Sitting)   Pulse 80   Temp (!) 96.3 °F (35.7 °C) (Tympanic)   Ht 5' 4\" (1.626 m)   Wt 190 lb (86.2 kg)   SpO2 98%   BMI 32.61 kg/m²     1. Have you been to the ER, urgent care clinic since your last visit? Hospitalized since your last visit? No    2. Have you seen or consulted any other health care providers outside of the [address] [place] [address] since your last visit? Include any pap smears or colon screening.  No

## 2020-07-24 ENCOUNTER — DOCUMENTATION ONLY (OUTPATIENT)
Dept: FAMILY MEDICINE CLINIC | Age: 65
End: 2020-07-24

## 2020-07-28 ENCOUNTER — VIRTUAL VISIT (OUTPATIENT)
Dept: FAMILY MEDICINE CLINIC | Age: 65
End: 2020-07-28

## 2020-07-28 DIAGNOSIS — R10.10 PAIN OF UPPER ABDOMEN: Primary | ICD-10-CM

## 2020-07-28 DIAGNOSIS — M16.11 PRIMARY OSTEOARTHRITIS OF RIGHT HIP: ICD-10-CM

## 2020-07-28 RX ORDER — TRAMADOL HYDROCHLORIDE 50 MG/1
50 TABLET ORAL
COMMUNITY
End: 2020-12-29 | Stop reason: ALTCHOICE

## 2020-07-28 NOTE — PROGRESS NOTES
Kvng Naranjo is a 77 y.o. female, evaluated via audio-only technology on 7/28/2020 for Rib Pain (Pain under breast @ ribcage and swelling)  . Assessment & Plan:   Diagnoses and all orders for this visit:    1. Pain of upper abdomen getting CT scan given long duration of symptoms. Lab work-up and 2/2020 with no significant concerns. Would consider getting H. pylori and new labs if CT is unrevealing and will plan to send back to Dr. Meenu Man for EGD  -     CT ABD W CONT; Future    2. Primary osteoarthritis of right hipto follow-up for preop prior to surgery. Encouraged patient to proceed with scheduling surgery given significant osteoarthritis with continued pain      Follow-up and Dispositions    · Return in about 6 weeks (around 9/8/2020), or if symptoms worsen or fail to improve. 12  Subjective:   Feeling swelling and pain under ribcage and in abd. Sx x 5 months. No f/c.  +chronic fatigue. No dyspnea or CP. Notes some leg swelling that comes and goes. No dysuria, hematuria, freq, urg. No n/v/d/c. No hematochezia or melena. No other lumps or bumps. No orthopnea or PND. Last colonoscopy in 2016 with Dr. Meenu Man and no concerns. Does have a history of PUD many years ago but symptoms were drastically different with chronic vomiting. Ct hip pain and planning for THR with Dr. Abigail Rice. Prior to Admission medications    Medication Sig Start Date End Date Taking? Authorizing Provider   traMADoL (ULTRAM) 50 mg tablet Take 50 mg by mouth every six (6) hours as needed for Pain. Yes Provider, Historical   lisinopril-hydroCHLOROthiazide (PRINZIDE, ZESTORETIC) 20-25 mg per tablet Take 1 tablet by mouth once daily 6/9/20  Yes Nancy Roy MD   atorvastatin (LIPITOR) 20 mg tablet Take 1 Tab by mouth daily.  2/24/20  Yes Nancy Roy MD   ALPRAZolam [place]) 0.5 mg tablet TAKE 1 TABLET BY MOUTH ONCE DAILY AT NIGHT AS NEEDED FOR  ANXIETY 2/21/20  Yes Nancy Roy MD   diclofenac (VOLTAREN) 1 % gel Apply 2 g to affected area four (4) times daily as needed for Pain. 2/21/20 7/28/20 Yes Marissa Flores MD   MV-MN/FOLIC ACID/CALCIUM/VIT K (ONE-A-DAY WOMEN'S 50 PLUS PO) Take 1 Tab by mouth daily. Yes Provider, Historical   urea 40 % lotion Apply  to affected area nightly. Patient taking differently: Apply  to affected area nightly as needed. 1/18/17  Yes Marissa Flores MD   meloxicam (MOBIC) 15 mg tablet Take 1 Tab by mouth daily (after breakfast). Take x 1 week and then stop. May use daily after this as needed. 8/13/18 7/28/20  Marissa Flores MD     Patient Active Problem List   Diagnosis Code    GERD (gastroesophageal reflux disease) K21.9    Hypertension I10    Anxiety state, unspecified F41.1    Diverticulosis K57.90    Depression F32.9    Hypercholesterolemia E78.00    DJD (Degenerative Joint Disease) of Knee     Prepyloric ulcer     Encounter for long-term (current) use of medications Z79.899    Atypical squamous cells of undetermined significance on cytologic smear of cervix (ASC-US) R87.610    Unilateral primary osteoarthritis, right hip M16.11     Past Medical History:   Diagnosis Date    Anxiety state, unspecified     Depression     Diverticulosis     DJD (Degenerative Joint Disease) of Knee     GERD (gastroesophageal reflux disease)     Hypercholesterolemia     Hypertension     Palmoplantar keratoderma     Prepyloric ulcer 2008     ROS  Gen - no fever/chills  Resp - no dyspnea or cough  CV - no chest pain or SORENSON  Rest per HPI    Patient-Reported Vitals 7/28/2020   Patient-Reported Temperature 97.7        [facility], who was evaluated through a patient-initiated, synchronous (real-time) audio only encounter, and/or her healthcare decision maker, is aware that it is a billable service, with coverage as determined by her insurance carrier. She provided verbal consent to proceed: Yes.  She has not had a related appointment within my department in the past 7 days or scheduled within the next 24 hours.       Total Time: minutes: 21-30 minutes    Neville Chung MD

## 2020-08-05 ENCOUNTER — HOSPITAL ENCOUNTER (OUTPATIENT)
Dept: CT IMAGING | Age: 65
Discharge: HOME | End: 2020-08-05
Attending: FAMILY MEDICINE
Payer: COMMERCIAL

## 2020-08-05 DIAGNOSIS — R10.10 PAIN OF UPPER ABDOMEN: ICD-10-CM

## 2020-08-05 PROCEDURE — 82565 ASSAY OF CREATININE: CPT

## 2020-08-05 PROCEDURE — 74160 CT ABDOMEN W/CONTRAST: CPT

## 2020-08-05 PROCEDURE — 74011636320 HC RX REV CODE- 636/320: Performed by: RADIOLOGY

## 2020-08-05 PROCEDURE — 74011000258 HC RX REV CODE- 258: Performed by: RADIOLOGY

## 2020-08-05 RX ORDER — SODIUM CHLORIDE 0.9 % (FLUSH) 0.9 %
10 SYRINGE (ML) INJECTION
Status: COMPLETED | OUTPATIENT
Start: 2020-08-05 | End: 2020-08-05

## 2020-08-05 RX ADMIN — IOPAMIDOL 100 ML: 755 INJECTION, SOLUTION INTRAVENOUS at 13:08

## 2020-08-05 RX ADMIN — Medication 10 ML: at 13:08

## 2020-08-05 RX ADMIN — SODIUM CHLORIDE 100 ML: 900 INJECTION, SOLUTION INTRAVENOUS at 13:08

## 2020-08-06 LAB — CREAT BLD-MCNC: 0.9 MG/DL (ref 0.6–1.3)

## 2020-08-07 DIAGNOSIS — R10.10 PAIN OF UPPER ABDOMEN: Primary | ICD-10-CM

## 2020-08-07 NOTE — PROGRESS NOTES
Discussed CT scan results with patient with no significant concerns. Noted to have hepatic steatosis and encouraged work on weight loss. No other significant concerns on lab work-up. Discussed having patient check in with Dr. Angel Zepeda if abdominal pain is still bothering her to consider EGD if warranted. Patient plans to proceed with total hip arthroplasty for her severe right hip arthritis and planning to schedule this with Dr. Jon Atkinson.

## 2020-12-18 ENCOUNTER — HOSPITAL ENCOUNTER (EMERGENCY)
Age: 65
Discharge: HOME | End: 2020-12-18
Attending: EMERGENCY MEDICINE | Admitting: EMERGENCY MEDICINE
Payer: MEDICARE

## 2020-12-18 VITALS
SYSTOLIC BLOOD PRESSURE: 151 MMHG | HEIGHT: 64 IN | OXYGEN SATURATION: 97 % | HEART RATE: 108 BPM | DIASTOLIC BLOOD PRESSURE: 80 MMHG | BODY MASS INDEX: 32.63 KG/M2 | WEIGHT: 191.14 LBS | RESPIRATION RATE: 16 BRPM | TEMPERATURE: 96.9 F

## 2020-12-18 DIAGNOSIS — M62.830 MUSCLE SPASM OF BACK: Primary | ICD-10-CM

## 2020-12-18 LAB
ALBUMIN SERPL-MCNC: 3.7 G/DL (ref 3.5–5)
ALBUMIN/GLOB SERPL: 0.8 {RATIO} (ref 1.1–2.2)
ALP SERPL-CCNC: 87 U/L (ref 45–117)
ALT SERPL-CCNC: 29 U/L (ref 12–78)
ANION GAP SERPL CALC-SCNC: 9 MMOL/L (ref 5–15)
APPEARANCE UR: CLEAR
AST SERPL-CCNC: 20 U/L (ref 15–37)
BACTERIA URNS QL MICRO: ABNORMAL /HPF
BASOPHILS # BLD: 0.1 K/UL (ref 0–0.1)
BASOPHILS NFR BLD: 1 % (ref 0–1)
BILIRUB SERPL-MCNC: 0.3 MG/DL (ref 0.2–1)
BILIRUB UR QL: NEGATIVE
BUN SERPL-MCNC: 15 MG/DL (ref 6–20)
BUN/CREAT SERPL: 15 (ref 12–20)
CALCIUM SERPL-MCNC: 9.2 MG/DL (ref 8.5–10.1)
CHLORIDE SERPL-SCNC: 99 MMOL/L (ref 97–108)
CO2 SERPL-SCNC: 29 MMOL/L (ref 21–32)
COLOR UR: ABNORMAL
CREAT SERPL-MCNC: 1 MG/DL (ref 0.55–1.02)
DIFFERENTIAL METHOD BLD: ABNORMAL
EOSINOPHIL # BLD: 0.1 K/UL (ref 0–0.4)
EOSINOPHIL NFR BLD: 2 % (ref 0–7)
EPITH CASTS URNS QL MICRO: ABNORMAL /LPF
ERYTHROCYTE [DISTWIDTH] IN BLOOD BY AUTOMATED COUNT: 13.9 % (ref 11.5–14.5)
GLOBULIN SER CALC-MCNC: 4.6 G/DL (ref 2–4)
GLUCOSE SERPL-MCNC: 111 MG/DL (ref 65–100)
GLUCOSE UR STRIP.AUTO-MCNC: NEGATIVE MG/DL
HCT VFR BLD AUTO: 41.6 % (ref 35–47)
HGB BLD-MCNC: 13.8 G/DL (ref 11.5–16)
HGB UR QL STRIP: NEGATIVE
IMM GRANULOCYTES # BLD AUTO: 0 K/UL (ref 0–0.04)
IMM GRANULOCYTES NFR BLD AUTO: 1 % (ref 0–0.5)
KETONES UR QL STRIP.AUTO: NEGATIVE MG/DL
LEUKOCYTE ESTERASE UR QL STRIP.AUTO: NEGATIVE
LIPASE SERPL-CCNC: 152 U/L (ref 73–393)
LYMPHOCYTES # BLD: 4.2 K/UL (ref 0.8–3.5)
LYMPHOCYTES NFR BLD: 57 % (ref 12–49)
MCH RBC QN AUTO: 28 PG (ref 26–34)
MCHC RBC AUTO-ENTMCNC: 33.2 G/DL (ref 30–36.5)
MCV RBC AUTO: 84.6 FL (ref 80–99)
MONOCYTES # BLD: 0.6 K/UL (ref 0–1)
MONOCYTES NFR BLD: 8 % (ref 5–13)
NEUTS SEG # BLD: 2.3 K/UL (ref 1.8–8)
NEUTS SEG NFR BLD: 31 % (ref 32–75)
NITRITE UR QL STRIP.AUTO: NEGATIVE
NRBC # BLD: 0 K/UL (ref 0–0.01)
NRBC BLD-RTO: 0 PER 100 WBC
PH UR STRIP: 5.5 [PH] (ref 5–8)
PLATELET # BLD AUTO: 322 K/UL (ref 150–400)
PMV BLD AUTO: 9.9 FL (ref 8.9–12.9)
POTASSIUM SERPL-SCNC: 3.3 MMOL/L (ref 3.5–5.1)
PROT SERPL-MCNC: 8.3 G/DL (ref 6.4–8.2)
PROT UR STRIP-MCNC: NEGATIVE MG/DL
RBC # BLD AUTO: 4.92 M/UL (ref 3.8–5.2)
RBC #/AREA URNS HPF: ABNORMAL /HPF (ref 0–5)
SODIUM SERPL-SCNC: 137 MMOL/L (ref 136–145)
SP GR UR REFRACTOMETRY: 1.02 (ref 1–1.03)
UR CULT HOLD, URHOLD: NORMAL
UROBILINOGEN UR QL STRIP.AUTO: 0.2 EU/DL (ref 0.2–1)
WBC # BLD AUTO: 7.4 K/UL (ref 3.6–11)
WBC URNS QL MICRO: ABNORMAL /HPF (ref 0–4)

## 2020-12-18 PROCEDURE — 81001 URINALYSIS AUTO W/SCOPE: CPT

## 2020-12-18 PROCEDURE — 74011250636 HC RX REV CODE- 250/636: Performed by: EMERGENCY MEDICINE

## 2020-12-18 PROCEDURE — 80053 COMPREHEN METABOLIC PANEL: CPT

## 2020-12-18 PROCEDURE — 85025 COMPLETE CBC W/AUTO DIFF WBC: CPT

## 2020-12-18 PROCEDURE — 36415 COLL VENOUS BLD VENIPUNCTURE: CPT

## 2020-12-18 PROCEDURE — 99284 EMERGENCY DEPT VISIT MOD MDM: CPT

## 2020-12-18 PROCEDURE — 83690 ASSAY OF LIPASE: CPT

## 2020-12-18 RX ORDER — ACETAMINOPHEN 500 MG
1000 TABLET ORAL
COMMUNITY
End: 2022-08-16

## 2020-12-18 RX ORDER — METHOCARBAMOL 500 MG/1
500 TABLET, FILM COATED ORAL
Qty: 20 TAB | Refills: 0 | Status: SHIPPED | OUTPATIENT
Start: 2020-12-18 | End: 2020-12-25

## 2020-12-18 RX ADMIN — SODIUM CHLORIDE 1000 ML: 900 INJECTION, SOLUTION INTRAVENOUS at 10:42

## 2020-12-18 NOTE — ED TRIAGE NOTES
Pt presents to ED with c/o two months of back and side pain. Pt states had negative CT at [facility] but is having increasing pain over the past day. Pt denies other symptoms.

## 2020-12-18 NOTE — ED PROVIDER NOTES
57-year-old female with a history of anxiety, depression, hypercholesterolemia, hypertension presents with back pain that started yesterday. She describes it as a spasm. States it is 2 out of 10 currently. She says that it worsened yesterday. She has been having upper abdominal and lower chest pain for which she had a CT in August that showed hepatic steatosis. Her doctor wanted her to get a colonoscopy and she went back to the GI doctor and had a negative stool study. She denies any fevers or chills. She denies any shortness of breath. There are no modifying factors to her pain. She denies any swelling in her legs. She is never had a blood clot before. She states that she is somewhat anxious.       Back Pain     Rib Pain         Past Medical History:   Diagnosis Date    Anxiety state, unspecified     Depression     Diverticulosis     DJD (Degenerative Joint Disease) of Knee     GERD (gastroesophageal reflux disease)     Hypercholesterolemia     Hypertension     Palmoplantar keratoderma     Prepyloric ulcer 2008       Past Surgical History:   Procedure Laterality Date    ENDOSCOPY, COLON, DIAGNOSTIC      due 2015         Family History:   Problem Relation Age of Onset    Dementia Mother     Cancer Father         cancer    Hypertension Father     Heart Disease Father     Other Brother 62        [facility]    Cancer Sister 7        [facility]       Social History     Socioeconomic History    Marital status: LIFE PARTNER     Spouse name: Not on file    Number of children: Not on file    Years of education: Not on file    Highest education level: Not on file   Occupational History    Not on file   Social Needs    Financial resource strain: Not on file    Food insecurity     Worry: Not on file     Inability: Not on file    Transportation needs     Medical: Not on file     Non-medical: Not on file   Tobacco Use    Smoking status: Former Smoker     Packs/day: 0.50 Years: 5.00     Pack years: 2.50     Quit date: 2000     Years since quittin.8    Smokeless tobacco: Never Used   Substance and Sexual Activity    Alcohol use: No    Drug use: No    Sexual activity: Not on file   Lifestyle    Physical activity     Days per week: Not on file     Minutes per session: Not on file    Stress: Not on file   Relationships    Social connections     Talks on phone: Not on file     Gets together: Not on file     Attends Baptism service: Not on file     Active member of club or organization: Not on file     Attends meetings of clubs or organizations: Not on file     Relationship status: Not on file    Intimate partner violence     Fear of current or ex partner: Not on file     Emotionally abused: Not on file     Physically abused: Not on file     Forced sexual activity: Not on file   Other Topics Concern    Not on file   Social History Narrative    Not on file         ALLERGIES: Patient has no known allergies. Review of Systems   Musculoskeletal: Positive for back pain. All other systems reviewed and are negative. Vitals:    20 1021 20 1022   BP: (!) 185/88    Pulse: (!) 108    Resp: 16    Temp: 96.9 °F (36.1 °C)    SpO2: 99% 99%   Weight: 86.7 kg (191 lb 2.2 oz)    Height: 5' 4\" (1.626 m)             Physical Exam  Vitals signs and nursing note reviewed. Constitutional:       General: She is not in acute distress. HENT:      Head: Normocephalic and atraumatic. Mouth/Throat:      Mouth: Mucous membranes are moist.   Eyes:      General: No scleral icterus. Conjunctiva/sclera: Conjunctivae normal.      Pupils: Pupils are equal, round, and reactive to light. Neck:      Musculoskeletal: Neck supple. Trachea: No tracheal deviation. Cardiovascular:      Rate and Rhythm: Normal rate and regular rhythm. Pulmonary:      Effort: Pulmonary effort is normal. No respiratory distress. Breath sounds: No wheezing or rales.    Abdominal: General: There is no distension. Palpations: Abdomen is soft. Tenderness: There is no abdominal tenderness. There is no right CVA tenderness or left CVA tenderness. Genitourinary:     Comments: deferred  Musculoskeletal:         General: No deformity. Skin:     General: Skin is warm and dry. Neurological:      General: No focal deficit present. Mental Status: She is alert. Psychiatric:         Mood and Affect: Mood normal.          MDM  Number of Diagnoses or Management Options  Diagnosis management comments: 78-year-old female presents with lower back pain described as a spasm likely representing muscle spasms. Also has a over 2-month history of lower chest wall and upper abdominal pain that is been worked up previously with a CT. Patient initially was tachycardic but as I was providing her reassurance her heart rate decreased on the monitor. Will check basic abdominal labs and urinalysis. Treat with IV fluids and will reassess. Procedures      11:15 AM  Patient re-evaluated. Heart rate improved to 87. all questions answered. Patient appropriate for discharge. Given return precautions and follow up instructions. LABORATORY TESTS:  Labs Reviewed   CBC WITH AUTOMATED DIFF - Abnormal; Notable for the following components:       Result Value    NEUTROPHILS 31 (*)     LYMPHOCYTES 57 (*)     IMMATURE GRANULOCYTES 1 (*)     ABS.  LYMPHOCYTES 4.2 (*)     All other components within normal limits   METABOLIC PANEL, COMPREHENSIVE - Abnormal; Notable for the following components:    Potassium 3.3 (*)     Glucose 111 (*)     GFR est non-AA 55 (*)     Protein, total 8.3 (*)     Globulin 4.6 (*)     A-G Ratio 0.8 (*)     All other components within normal limits   URINALYSIS W/MICROSCOPIC - Abnormal; Notable for the following components:    Bacteria 1+ (*)     All other components within normal limits   URINE CULTURE HOLD SAMPLE   LIPASE       IMAGING RESULTS:  No orders to display MEDICATIONS GIVEN:  Medications   sodium chloride 0.9 % bolus infusion 1,000 mL (0 mL IntraVENous IV Completed 12/18/20 1100)       IMPRESSION:  1. Muscle spasm of back        PLAN:  1. Current Discharge Medication List      START taking these medications    Details   methocarbamoL (Robaxin) 500 mg tablet Take 1 Tab by mouth four (4) times daily as needed for Muscle Spasm(s) for up to 7 days. Qty: 20 Tab, Refills: 0           2. Follow-up Information     Follow up With Specialties Details Why Contact Info    Acacia Kim MD Family Medicine Schedule an appointment as soon as possible for a visit   [phone] DEPT Emergency Medicine  If symptoms worsen or new concerns [facility] [address]  [phone]        3. Return to ED for new or worsening symptoms       Asif Ruiz.  Puma Bliss MD

## 2020-12-29 ENCOUNTER — OFFICE VISIT (OUTPATIENT)
Dept: FAMILY MEDICINE CLINIC | Age: 65
End: 2020-12-29
Payer: MEDICARE

## 2020-12-29 VITALS
SYSTOLIC BLOOD PRESSURE: 139 MMHG | BODY MASS INDEX: 32.37 KG/M2 | HEIGHT: 64 IN | WEIGHT: 189.6 LBS | OXYGEN SATURATION: 98 % | DIASTOLIC BLOOD PRESSURE: 66 MMHG | HEART RATE: 76 BPM | RESPIRATION RATE: 16 BRPM | TEMPERATURE: 96.2 F

## 2020-12-29 DIAGNOSIS — Z79.899 ENCOUNTER FOR LONG-TERM (CURRENT) USE OF MEDICATIONS: ICD-10-CM

## 2020-12-29 DIAGNOSIS — I10 ESSENTIAL HYPERTENSION: Primary | ICD-10-CM

## 2020-12-29 DIAGNOSIS — M54.6 THORACIC BACK PAIN, UNSPECIFIED BACK PAIN LATERALITY, UNSPECIFIED CHRONICITY: ICD-10-CM

## 2020-12-29 DIAGNOSIS — E78.00 HYPERCHOLESTEROLEMIA: ICD-10-CM

## 2020-12-29 DIAGNOSIS — F41.9 ANXIETY: ICD-10-CM

## 2020-12-29 DIAGNOSIS — R10.10 PAIN OF UPPER ABDOMEN: ICD-10-CM

## 2020-12-29 PROCEDURE — G8754 DIAS BP LESS 90: HCPCS | Performed by: FAMILY MEDICINE

## 2020-12-29 PROCEDURE — 99214 OFFICE O/P EST MOD 30 MIN: CPT | Performed by: FAMILY MEDICINE

## 2020-12-29 PROCEDURE — G8417 CALC BMI ABV UP PARAM F/U: HCPCS | Performed by: FAMILY MEDICINE

## 2020-12-29 PROCEDURE — G9717 DOC PT DX DEP/BP F/U NT REQ: HCPCS | Performed by: FAMILY MEDICINE

## 2020-12-29 PROCEDURE — G8427 DOCREV CUR MEDS BY ELIG CLIN: HCPCS | Performed by: FAMILY MEDICINE

## 2020-12-29 PROCEDURE — G8752 SYS BP LESS 140: HCPCS | Performed by: FAMILY MEDICINE

## 2020-12-29 PROCEDURE — G8536 NO DOC ELDER MAL SCRN: HCPCS | Performed by: FAMILY MEDICINE

## 2020-12-29 PROCEDURE — 1101F PT FALLS ASSESS-DOCD LE1/YR: CPT | Performed by: FAMILY MEDICINE

## 2020-12-29 PROCEDURE — G8399 PT W/DXA RESULTS DOCUMENT: HCPCS | Performed by: FAMILY MEDICINE

## 2020-12-29 PROCEDURE — 3017F COLORECTAL CA SCREEN DOC REV: CPT | Performed by: FAMILY MEDICINE

## 2020-12-29 PROCEDURE — 1090F PRES/ABSN URINE INCON ASSESS: CPT | Performed by: FAMILY MEDICINE

## 2020-12-29 RX ORDER — ALPRAZOLAM 0.5 MG/1
TABLET ORAL
Qty: 30 TAB | Refills: 1 | Status: SHIPPED | OUTPATIENT
Start: 2020-12-29 | End: 2021-06-21

## 2020-12-29 RX ORDER — MELOXICAM 15 MG/1
15 TABLET ORAL
Qty: 30 TAB | Refills: 0 | Status: SHIPPED | OUTPATIENT
Start: 2020-12-29 | End: 2022-08-16

## 2020-12-29 NOTE — PROGRESS NOTES
Subjective:     Chief Complaint   Patient presents with    Follow-up     ER Visit        She  is a 77 y.o. female who presents for evaluation of: Has retired from [employer]. Here for ER f/u. Seen for back pain and abd pain. Thought to have muscle spasm. Had mild hypokalemia. Put on robaxin and no concerns. Has not had another abdominal spasms since then. To have R hip sgy next yr. Colonoscopy - UTD  Seeing Dr. Manan Ashton at [facility] - UTD on paps and mammos    Hyperlipidemia & HTN  Pt is doing well on current meds with no medication side effects noted  No new myalgias, no joint pains, no weakness  No TIA's, no chest pain on exertion, no dyspnea on exertion, no swelling of ankles. Exercising - Minimal, pain in knees, back, and right hip  Dieting - Yes  Smoking - No    ROS  Gen - no fever/chills  Resp - no dyspnea or cough  CV - no chest pain or SORENSON  Psych - anxiety well controlled, using Xanax sparingly  Rest per HPI    Past Medical History:   Diagnosis Date    Anxiety state, unspecified     Depression     Diverticulosis     DJD (Degenerative Joint Disease) of Knee     GERD (gastroesophageal reflux disease)     Hypercholesterolemia     Hypertension     Palmoplantar keratoderma     Prepyloric ulcer 2008     Past Surgical History:   Procedure Laterality Date    ENDOSCOPY, COLON, DIAGNOSTIC      due 2015     Current Outpatient Medications on File Prior to Visit   Medication Sig Dispense Refill    acetaminophen (TYLENOL) 500 mg tablet Take 1,000 mg by mouth every six (6) hours as needed for Pain.  lisinopril-hydroCHLOROthiazide (PRINZIDE, ZESTORETIC) 20-25 mg per tablet Take 1 tablet by mouth once daily 90 Tab 1    MV-MN/FOLIC ACID/CALCIUM/VIT K (ONE-A-DAY WOMEN'S 50 PLUS PO) Take 1 Tab by mouth daily.  atorvastatin (LIPITOR) 20 mg tablet Take 1 Tab by mouth daily. 30 Tab 5     No current facility-administered medications on file prior to visit.          Objective:     Vitals: 12/29/20 1458   BP: 139/66   Pulse: 76   Resp: 16   Temp: (!) 96.2 °F (35.7 °C)   TempSrc: Temporal   SpO2: 98%   Weight: 189 lb 9.6 oz (86 kg)   Height: 5' 4\" (1.626 m)       Physical Examination:  General appearance - alert, well appearing, and in no distress  Eyes -sclera anicteric  Chest - clear to auscultation, no wheezes, rales or rhonchi, symmetric air entry  Heart - normal rate, regular rhythm, normal S1, S2, no murmurs, rubs, clicks or gallops  Abd - no ttp, ND, no rebound  Back - FROM, no ttp  Neurological - alert, oriented, no focal findings or movement disorder noted  Extremities-no edema  Psych-normal mood and affect    Assessment/ Plan:   Diagnoses and all orders for this visit:    1. Essential hypertension-controlled  -     METABOLIC PANEL, COMPREHENSIVE    2. Pain of upper abdomen-most consistent with abdominal muscle spasms. Discussed avoiding any additional imaging or work-up if this does not occur  -     XR SPINE THORAC 3 V; Future  -     METABOLIC PANEL, COMPREHENSIVE  -     meloxicam (MOBIC) 15 mg tablet; Take 1 Tab by mouth daily (after breakfast). Take x 1 week and then stop. May use daily after this as needed. 3. Hypercholesterolemia-rechecking labs  -     HEMOGLOBIN A1C WITH EAG  -     LIPID PANEL  -     TSH 3RD GENERATION  -     METABOLIC PANEL, COMPREHENSIVE    4. Anxiety-generally well controlled with as needed Xanax  -     ALPRAZolam (XANAX) 0.5 mg tablet; TAKE 1 TABLET BY MOUTH ONCE DAILY AT NIGHT AS NEEDED FOR  ANXIETY    5. Thoracic back pain, unspecified back pain laterality, unspecified chronicity  -     XR SPINE THORAC 3 V; Future  -     meloxicam (MOBIC) 15 mg tablet; Take 1 Tab by mouth daily (after breakfast). Take x 1 week and then stop. May use daily after this as needed.     6. Encounter for long-term (current) use of medications  -     HEMOGLOBIN A1C WITH EAG  -     LIPID PANEL  -     TSH 3RD GENERATION  -     METABOLIC PANEL, COMPREHENSIVE       I have discussed the diagnosis with the patient and the intended plan as seen in the above orders. The patient has received an after-visit summary and questions were answered concerning future plans. I have discussed medication side effects and warnings with the patient as well. The patient verbalizes understanding and agreement with the plan. Follow-up and Dispositions    · Return in about 3 months (around 3/29/2021), or if symptoms worsen or fail to improve.

## 2020-12-29 NOTE — PROGRESS NOTES
Chief Complaint   Patient presents with    Follow-up     ER Visit   1. Have you been to the ER, urgent care clinic since your last visit? Hospitalized since your last visit? Yes Where: ER 12/18/20    2. Have you seen or consulted any other health care providers outside of the [address] [place] [address] since your last visit? Include any pap smears or colon screening.  No

## 2021-03-18 DIAGNOSIS — I10 ESSENTIAL HYPERTENSION: ICD-10-CM

## 2021-03-18 RX ORDER — LISINOPRIL AND HYDROCHLOROTHIAZIDE 20; 25 MG/1; MG/1
TABLET ORAL
Qty: 90 TAB | Refills: 0 | Status: SHIPPED | OUTPATIENT
Start: 2021-03-18 | End: 2021-06-21

## 2021-06-18 DIAGNOSIS — I10 ESSENTIAL HYPERTENSION: ICD-10-CM

## 2021-06-18 DIAGNOSIS — F41.9 ANXIETY: ICD-10-CM

## 2021-06-21 RX ORDER — LISINOPRIL AND HYDROCHLOROTHIAZIDE 20; 25 MG/1; MG/1
TABLET ORAL
Qty: 90 TABLET | Refills: 0 | Status: SHIPPED | OUTPATIENT
Start: 2021-06-21

## 2021-06-21 RX ORDER — ALPRAZOLAM 0.5 MG/1
TABLET ORAL
Qty: 30 TABLET | Refills: 0 | Status: SHIPPED | OUTPATIENT
Start: 2021-06-21 | End: 2022-08-16

## 2021-12-22 ENCOUNTER — DOCUMENTATION ONLY (OUTPATIENT)
Dept: FAMILY MEDICINE CLINIC | Age: 66
End: 2021-12-22

## 2021-12-22 ENCOUNTER — PATIENT MESSAGE (OUTPATIENT)
Dept: FAMILY MEDICINE CLINIC | Age: 66
End: 2021-12-22

## 2022-03-20 PROBLEM — M16.11 UNILATERAL PRIMARY OSTEOARTHRITIS, RIGHT HIP: Status: ACTIVE | Noted: 2020-03-09

## 2022-07-19 DIAGNOSIS — M16.11 UNILATERAL PRIMARY OSTEOARTHRITIS, RIGHT HIP: Primary | ICD-10-CM

## 2022-07-21 ENCOUNTER — OFFICE VISIT (OUTPATIENT)
Dept: ORTHOPEDIC SURGERY | Age: 67
End: 2022-07-21
Payer: MEDICARE

## 2022-07-21 ENCOUNTER — HOSPITAL ENCOUNTER (OUTPATIENT)
Dept: GENERAL RADIOLOGY | Age: 67
Discharge: HOME | End: 2022-07-21
Payer: MEDICARE

## 2022-07-21 VITALS
HEART RATE: 80 BPM | HEIGHT: 64 IN | RESPIRATION RATE: 18 BRPM | SYSTOLIC BLOOD PRESSURE: 112 MMHG | OXYGEN SATURATION: 100 % | WEIGHT: 181.4 LBS | TEMPERATURE: 97.9 F | BODY MASS INDEX: 30.97 KG/M2 | DIASTOLIC BLOOD PRESSURE: 69 MMHG

## 2022-07-21 DIAGNOSIS — M16.11 UNILATERAL PRIMARY OSTEOARTHRITIS, RIGHT HIP: Primary | ICD-10-CM

## 2022-07-21 DIAGNOSIS — M16.11 UNILATERAL PRIMARY OSTEOARTHRITIS, RIGHT HIP: ICD-10-CM

## 2022-07-21 PROCEDURE — G8419 CALC BMI OUT NRM PARAM NOF/U: HCPCS | Performed by: ORTHOPAEDIC SURGERY

## 2022-07-21 PROCEDURE — 1101F PT FALLS ASSESS-DOCD LE1/YR: CPT | Performed by: ORTHOPAEDIC SURGERY

## 2022-07-21 PROCEDURE — 73502 X-RAY EXAM HIP UNI 2-3 VIEWS: CPT

## 2022-07-21 PROCEDURE — 1090F PRES/ABSN URINE INCON ASSESS: CPT | Performed by: ORTHOPAEDIC SURGERY

## 2022-07-21 PROCEDURE — G8754 DIAS BP LESS 90: HCPCS | Performed by: ORTHOPAEDIC SURGERY

## 2022-07-21 PROCEDURE — G9717 DOC PT DX DEP/BP F/U NT REQ: HCPCS | Performed by: ORTHOPAEDIC SURGERY

## 2022-07-21 PROCEDURE — 1123F ACP DISCUSS/DSCN MKR DOCD: CPT | Performed by: ORTHOPAEDIC SURGERY

## 2022-07-21 PROCEDURE — G8427 DOCREV CUR MEDS BY ELIG CLIN: HCPCS | Performed by: ORTHOPAEDIC SURGERY

## 2022-07-21 PROCEDURE — 99213 OFFICE O/P EST LOW 20 MIN: CPT | Performed by: ORTHOPAEDIC SURGERY

## 2022-07-21 PROCEDURE — G8399 PT W/DXA RESULTS DOCUMENT: HCPCS | Performed by: ORTHOPAEDIC SURGERY

## 2022-07-21 PROCEDURE — 3017F COLORECTAL CA SCREEN DOC REV: CPT | Performed by: ORTHOPAEDIC SURGERY

## 2022-07-21 PROCEDURE — G9899 SCRN MAM PERF RSLTS DOC: HCPCS | Performed by: ORTHOPAEDIC SURGERY

## 2022-07-21 PROCEDURE — G8536 NO DOC ELDER MAL SCRN: HCPCS | Performed by: ORTHOPAEDIC SURGERY

## 2022-07-21 PROCEDURE — G8752 SYS BP LESS 140: HCPCS | Performed by: ORTHOPAEDIC SURGERY

## 2022-07-21 RX ORDER — TRAMADOL HYDROCHLORIDE 50 MG/1
50 TABLET ORAL
Qty: 28 TABLET | Refills: 0 | Status: SHIPPED | OUTPATIENT
Start: 2022-07-21 | End: 2022-07-28

## 2022-07-21 NOTE — PROGRESS NOTES
Rm    Chief Complaint   Patient presents with    Hip Pain     Right hip        Visit Vitals  /69 (BP 1 Location: Left upper arm, BP Patient Position: Sitting, BP Cuff Size: Adult)   Pulse 80   Temp 97.9 °F (36.6 °C) (Temporal)   Resp 18   Ht 5' 4\" (1.626 m)   Wt 181 lb 6.4 oz (82.3 kg)   SpO2 100%   BMI 31.14 kg/m²        1. Have you been to the ER, urgent care clinic since your last visit? Hospitalized since your last visit? No    2. Have you seen or consulted any other health care providers outside of the [address] [place] [address] since your last visit? Include any pap smears or colon screening. No     Health Maintenance Due   Topic Date Due    Depression Monitoring  Never done    Shingrix Vaccine Age 49> (1 of 2) Never done    Pneumococcal 65+ years (2 - PCV) 03/12/2020    Medicare Yearly Exam  Never done    Lipid Screen  02/20/2021    COVID-19 Vaccine (3 - Booster for Pfizer series) 09/01/2021    Breast Cancer Screen Mammogram  06/01/2022        3 most recent PHQ Screens 7/21/2022   Little interest or pleasure in doing things Not at all   Feeling down, depressed, irritable, or hopeless Not at all   Total Score PHQ 2 0   Trouble falling or staying asleep, or sleeping too much Not at all   Feeling tired or having little energy Not at all   Poor appetite, weight loss, or overeating Not at all   Feeling bad about yourself - or that you are a failure or have let yourself or your family down Not at all   Trouble concentrating on things such as school, work, reading, or watching TV Not at all   Moving or speaking so slowly that other people could have noticed; or the opposite being so fidgety that others notice Not at all   Thoughts of being better off dead, or hurting yourself in some way Not at all   PHQ 9 Score 0        Fall Risk Assessment, last 12 mths 7/21/2022   Able to walk? Yes   Fall in past 12 months? 1   Do you feel unsteady? 0   Are you worried about falling 0   Is TUG test greater than 12 seconds? (No Data)   Is the gait abnormal? 0   Number of falls in past 12 months 2   Fall with injury?  0       Learning Assessment 3/9/2020   PRIMARY LEARNER Patient   HIGHEST LEVEL OF EDUCATION - PRIMARY LEARNER  -   BARRIERS PRIMARY LEARNER NONE   CO-LEARNER CAREGIVER No   PRIMARY LANGUAGE ENGLISH   LEARNER PREFERENCE PRIMARY DEMONSTRATION   LEARNING SPECIAL TOPICS -   ANSWERED BY Patient   RELATIONSHIP SELF

## 2022-07-21 NOTE — LETTER
7/21/2022    Patient: Liz Truong   YOB: 1955   Date of Visit: 7/21/2022     [place] [id number]  Via In Basket    Dear Juliocesar Oneal MD,      Thank you for referring Ms. Dayami Valentino to [facility] for evaluation. My notes for this consultation are attached. If you have questions, please do not hesitate to call me. I look forward to following your patient along with you.       Sincerely,    Nancy Davidson, DO

## 2022-07-22 NOTE — PROGRESS NOTES
7/21/2022    Chief Complaint: Right hip pain    Assessment: Unilateral primary osteoarthritis of the right hip  (M16.11)     Plan: This patient and I did have a long discussion regarding the treatment options. Nonoperative management was discussed at length, continued pain control, physical therapy, injections, ambulatory aids, and weight loss. I did speak with the patient specifically that there are always some nonoperative options, and that surgery would be elective on their part. We did discuss the risks of surgery which include but are not limited to infection, nerve or blood vessel damage, femoral, lateral femoral cutaneous nerve injury, sciatic nerve injury, failure of fixation, failure of any possible implant, need for reoperation, postoperative pain and swelling, intra-or postoperative fracture, postoperative dislocation, leg length inequality, need for reoperation, implant failure, death, disability, organ dysfunction, wound healing issues, DVT, PE, and the need for further procedures. The patient did freely state their understanding and satisfaction with our discussion. We will proceed with a total hip arthroplasty after  medical clearances. The patient was counseled at length about the risks of mike Covid-19 during their perioperative period and any recovery window from their procedure. The patient was made aware that mike Covid-19  may worsen their prognosis for recovering from their procedure and lend to a higher morbidity and/or mortality risk. All material risks, benefits, and reasonable alternatives including postponing the procedure were discussed. The patient does  wish to proceed with the procedure at this time. HPI: This is a 69 y. o.female who complains of right hip pain which is activity dependent. The patient has had activity dependent pain for years. The patient has tried activity modification,  physical therapy, injections .   The pain is in the groin, it is severe in intensity. The patient fears falling, walks with a limp, and feels as though all nonoperative management has failed. The patient denies any numbness, weakness, tingling, fevers, chills, nausea, vomiting, fevers, chills, nausea, vomiting. Past Medical History:   Diagnosis Date    Anxiety state, unspecified     Depression     Diverticulosis     DJD (Degenerative Joint Disease) of Knee     GERD (gastroesophageal reflux disease)     Hypercholesterolemia     Hypertension     Palmoplantar keratoderma     Prepyloric ulcer 2008       Past Surgical History:   Procedure Laterality Date    ENDOSCOPY, COLON, DIAGNOSTIC      due 2015       Current Outpatient Medications on File Prior to Visit   Medication Sig Dispense Refill    lisinopril-hydroCHLOROthiazide (PRINZIDE, ZESTORETIC) 20-25 mg per tablet Take 1 tablet by mouth once daily 90 Tablet 0    ALPRAZolam (XANAX) 0.5 mg tablet TAKE 1 TABLET BY MOUTH ONCE DAILY AT NIGHT AS NEEDED FOR ANXIETY 30 Tablet 0    meloxicam (MOBIC) 15 mg tablet Take 1 Tab by mouth daily (after breakfast). Take x 1 week and then stop. May use daily after this as needed. 30 Tab 0    acetaminophen (TYLENOL) 500 mg tablet Take 1,000 mg by mouth every six (6) hours as needed for Pain. MV-MN/FOLIC ACID/CALCIUM/VIT K (ONE-A-DAY WOMEN'S 50 PLUS PO) Take 1 Tab by mouth daily. atorvastatin (LIPITOR) 20 mg tablet Take 1 Tab by mouth daily. (Patient not taking: Reported on 7/21/2022) 30 Tab 5     No current facility-administered medications on file prior to visit.        No Known Allergies    Family History   Problem Relation Age of Onset    Dementia Mother     Cancer Father         cancer    Hypertension Father     Heart Disease Father     Other Brother 62        [facility]    Cancer Sister 7        Brain Cancer       Social History     Socioeconomic History    Marital status: LIFE PARTNER   Tobacco Use    Smoking status: Former     Packs/day: 0.50     Years: 5.00     Pack years: 2.50     Types: Cigarettes     Quit date: 2000     Years since quittin.4    Smokeless tobacco: Never   Vaping Use    Vaping Use: Never used   Substance and Sexual Activity    Alcohol use: No    Drug use: No         Review of Systems:       General: Denies headache, lethargy, fever, weight loss  Ears/Nose/Throat: Denies ear discharge, drainage, nosebleeds, hoarse voice, dental problems  Cardiovascular: Denies chest pain, shortness of breath  Lungs: Denies chest pain, breathing problems, wheezing, pneumonia  Stomach: Denies stomach pain, heartburn, constipation, irritable bowel  Skin: Denies rash, sores, open wounds  Musculoskeletal: Admits to right hip pain  Genitourinary: Denies dysuria, hematuria, polyuria  Gastrointestinal: Denies constipation, obstipation, diarrhea  Neurological: Denies changes in sight, smell, hearing, taste, seizures. Denies loss of consciousness. Psychiatric: Denies depression, sleep pattern changes, anxiety, change in personality  Endocrine: Denies mood swings, heat or cold intolerance  Hematologic/Lymphatic: Denies anemia, purpura, petechia  Allergic/Immunologic: Denies swelling of throat, pain or swelling at lymph nodes      Physical Examination:      General: AOX3, no apparent distress  Psychiatric: mood and affect appropriate  Lungs: clear to auscultation bilaterally  Heart: regular rate and rhythm  Abdomen: no guarding  Head: normocephalic, atraumatic  Skin: No significant abnormalities, good turgor  Sensation intact to light touch: L1-S1 dermatomes  Muscular exam: 5/5 strength in all major muscle groups unless noted in specialty exam.    Extremities      Left upper extremity: Full active and passive range of motion without pain, deformity, no open wound, strength 5/5 in all major muscle groups. Right upper extremity: Full active and passive range of motion without pain, deformity, no open wound, strength 5/5 in all major muscle groups.     Left lower extremity: Full active and passive range of motion without pain, deformity, no open wound, strength 5/5 in all major muscle groups. Right lower extremity:  No deformity is noted. Circumduction of the hip causes significant pain in the groin, reproductive of the chief complaint. Range of motion is limited, with a positive impingement sign. ADRIÁN test causes some pain in the groin. Gait is antalgic. Slight tenderness to palpation on the lateral aspect of the hip. Sensation is intact to light touch in the L1-S1 dermatomes. Skin is intact about the hip. Hip flexion and abduction strength is 4+/5, hip extension and knee extension as well as tibialis anterior and EHL/GCS are 5/5 strength. Diagnostics:    Pertinent Xrays:  Pelvis and hip xrays indicate severe osteoarthritis of the right hip. There are osteophytes at the femoral neck and head, as well as subchondral sclerosis of the acetabular rim. Ms. Jas La has a reminder for a \"due or due soon\" health maintenance. I have asked that she contact her primary care provider for follow-up on this health maintenance.

## 2022-07-26 ENCOUNTER — TELEPHONE (OUTPATIENT)
Dept: ORTHOPEDIC SURGERY | Age: 67
End: 2022-07-26

## 2022-07-26 NOTE — TELEPHONE ENCOUNTER
Contacted patient to schedule surgery. Scheduled for 8/23/22. Advised patient that clearances from Dr. Deandre Chou would be required, and would need to be received no less that 2 business days before surgery. Patient advised to contact the office(s) to make pre op appts as soon as possible. Patient verbalized understanding and was encouraged to contact our office with any questions or concerns obtaining to upcoming surgery or required clearances. Clearance letters faxed to [phone]. Confirmation was received.    Pt's  will be staying with her post op.           ----- Message from Lionel Carreon DO sent at 7/21/2022  9:10 PM EDT -----  Diagnosis: Unilateral Primary Osteoarthritis, right hip (M16.1)    Procedure: Right total hip arthroplasty  CPT: 40968  Operative minutes: 100  Inpatient  Location: [facility] Main OR  PAT: Yes  Class: Yes  Special Equipment: C arm (12 inch), HANA table, Direct Anterior total Hip, Beronica Accolade II  Staffing: Assistant/retractor peterson  Anesthesia: spinal

## 2022-08-16 ENCOUNTER — HOSPITAL ENCOUNTER (OUTPATIENT)
Dept: PREADMISSION TESTING | Age: 67
Discharge: HOME | End: 2022-08-16
Attending: ORTHOPAEDIC SURGERY
Payer: MEDICARE

## 2022-08-16 VITALS
OXYGEN SATURATION: 99 % | DIASTOLIC BLOOD PRESSURE: 52 MMHG | WEIGHT: 177.91 LBS | RESPIRATION RATE: 18 BRPM | TEMPERATURE: 98.8 F | SYSTOLIC BLOOD PRESSURE: 134 MMHG | HEIGHT: 64 IN | BODY MASS INDEX: 30.37 KG/M2 | HEART RATE: 80 BPM

## 2022-08-16 LAB
ABO + RH BLD: NORMAL
ALBUMIN SERPL-MCNC: 3.7 G/DL (ref 3.5–5)
ALBUMIN/GLOB SERPL: 0.8 {RATIO} (ref 1.1–2.2)
ALP SERPL-CCNC: 93 U/L (ref 45–117)
ALT SERPL-CCNC: 20 U/L (ref 12–78)
ANION GAP SERPL CALC-SCNC: 6 MMOL/L (ref 5–15)
APPEARANCE UR: CLEAR
AST SERPL-CCNC: 16 U/L (ref 15–37)
ATRIAL RATE: 72 BPM
BACTERIA URNS QL MICRO: ABNORMAL /HPF
BILIRUB SERPL-MCNC: 0.3 MG/DL (ref 0.2–1)
BILIRUB UR QL: NEGATIVE
BLOOD GROUP ANTIBODIES SERPL: NORMAL
BUN SERPL-MCNC: 19 MG/DL (ref 6–20)
BUN/CREAT SERPL: 17 (ref 12–20)
CALCIUM SERPL-MCNC: 9.7 MG/DL (ref 8.5–10.1)
CALCULATED P AXIS, ECG09: 97 DEGREES
CALCULATED R AXIS, ECG10: -12 DEGREES
CALCULATED T AXIS, ECG11: 22 DEGREES
CHLORIDE SERPL-SCNC: 101 MMOL/L (ref 97–108)
CO2 SERPL-SCNC: 30 MMOL/L (ref 21–32)
COLOR UR: ABNORMAL
CREAT SERPL-MCNC: 1.13 MG/DL (ref 0.55–1.02)
DIAGNOSIS, 93000: NORMAL
EPITH CASTS URNS QL MICRO: ABNORMAL /LPF
ERYTHROCYTE [DISTWIDTH] IN BLOOD BY AUTOMATED COUNT: 13.7 % (ref 11.5–14.5)
EST. AVERAGE GLUCOSE BLD GHB EST-MCNC: 120 MG/DL
GLOBULIN SER CALC-MCNC: 4.8 G/DL (ref 2–4)
GLUCOSE SERPL-MCNC: 122 MG/DL (ref 65–100)
GLUCOSE UR STRIP.AUTO-MCNC: NEGATIVE MG/DL
HBA1C MFR BLD: 5.8 % (ref 4–5.6)
HCT VFR BLD AUTO: 38.7 % (ref 35–47)
HGB BLD-MCNC: 13.1 G/DL (ref 11.5–16)
HGB UR QL STRIP: NEGATIVE
HYALINE CASTS URNS QL MICRO: ABNORMAL /LPF (ref 0–2)
INR PPP: 1 (ref 0.9–1.1)
KETONES UR QL STRIP.AUTO: NEGATIVE MG/DL
LEUKOCYTE ESTERASE UR QL STRIP.AUTO: ABNORMAL
MCH RBC QN AUTO: 28.3 PG (ref 26–34)
MCHC RBC AUTO-ENTMCNC: 33.9 G/DL (ref 30–36.5)
MCV RBC AUTO: 83.6 FL (ref 80–99)
NITRITE UR QL STRIP.AUTO: NEGATIVE
NRBC # BLD: 0 K/UL (ref 0–0.01)
NRBC BLD-RTO: 0 PER 100 WBC
P-R INTERVAL, ECG05: 156 MS
PH UR STRIP: 6 [PH] (ref 5–8)
PLATELET # BLD AUTO: 353 K/UL (ref 150–400)
PMV BLD AUTO: 9.8 FL (ref 8.9–12.9)
POTASSIUM SERPL-SCNC: 3.4 MMOL/L (ref 3.5–5.1)
PROT SERPL-MCNC: 8.5 G/DL (ref 6.4–8.2)
PROT UR STRIP-MCNC: NEGATIVE MG/DL
PROTHROMBIN TIME: 10.4 SEC (ref 9–11.1)
Q-T INTERVAL, ECG07: 384 MS
QRS DURATION, ECG06: 80 MS
QTC CALCULATION (BEZET), ECG08: 420 MS
RBC # BLD AUTO: 4.63 M/UL (ref 3.8–5.2)
RBC #/AREA URNS HPF: ABNORMAL /HPF (ref 0–5)
SODIUM SERPL-SCNC: 137 MMOL/L (ref 136–145)
SP GR UR REFRACTOMETRY: 1.02
SPECIMEN EXP DATE BLD: NORMAL
UA: UC IF INDICATED,UAUC: ABNORMAL
UROBILINOGEN UR QL STRIP.AUTO: 1 EU/DL (ref 0.2–1)
VENTRICULAR RATE, ECG03: 72 BPM
WBC # BLD AUTO: 6.2 K/UL (ref 3.6–11)
WBC URNS QL MICRO: ABNORMAL /HPF (ref 0–4)

## 2022-08-16 PROCEDURE — 86900 BLOOD TYPING SEROLOGIC ABO: CPT

## 2022-08-16 PROCEDURE — 97530 THERAPEUTIC ACTIVITIES: CPT

## 2022-08-16 PROCEDURE — 85610 PROTHROMBIN TIME: CPT

## 2022-08-16 PROCEDURE — 83036 HEMOGLOBIN GLYCOSYLATED A1C: CPT

## 2022-08-16 PROCEDURE — 36415 COLL VENOUS BLD VENIPUNCTURE: CPT

## 2022-08-16 PROCEDURE — 85027 COMPLETE CBC AUTOMATED: CPT

## 2022-08-16 PROCEDURE — 93005 ELECTROCARDIOGRAM TRACING: CPT

## 2022-08-16 PROCEDURE — 80053 COMPREHEN METABOLIC PANEL: CPT

## 2022-08-16 PROCEDURE — 81001 URINALYSIS AUTO W/SCOPE: CPT

## 2022-08-16 PROCEDURE — 97116 GAIT TRAINING THERAPY: CPT

## 2022-08-16 PROCEDURE — 97162 PT EVAL MOD COMPLEX 30 MIN: CPT

## 2022-08-16 RX ORDER — CHOLECALCIFEROL (VITAMIN D3) 125 MCG
1 CAPSULE ORAL
COMMUNITY

## 2022-08-16 RX ORDER — CHOLECALCIFEROL TAB 125 MCG (5000 UNIT) 125 MCG
5000 TAB ORAL DAILY
COMMUNITY

## 2022-08-16 RX ORDER — TRAMADOL HYDROCHLORIDE 50 MG/1
50 TABLET ORAL
COMMUNITY
End: 2022-08-23

## 2022-08-16 RX ORDER — ACETAMINOPHEN 500 MG
TABLET ORAL
COMMUNITY

## 2022-08-16 RX ORDER — BUSPIRONE HYDROCHLORIDE 10 MG/1
TABLET ORAL
COMMUNITY

## 2022-08-16 RX ORDER — SODIUM CHLORIDE, SODIUM LACTATE, POTASSIUM CHLORIDE, CALCIUM CHLORIDE 600; 310; 30; 20 MG/100ML; MG/100ML; MG/100ML; MG/100ML
25 INJECTION, SOLUTION INTRAVENOUS CONTINUOUS
Status: CANCELLED | OUTPATIENT
Start: 2022-08-23

## 2022-08-16 NOTE — PROGRESS NOTES
[facility]  Physical Therapy Pre-surgery evaluation  [address]  [place], [address]    PHYSICAL THERAPY PRE THR SURGERY EVALUATION  Patient: Priti Keys (89 y.o. female)  Date: 8/16/2022  Primary Diagnosis: pat  Procedure(s) (LRB):  RIGHT TOTAL  ARTHROPLASTY, DIRECT ANTERIOR (Right)     Precautions:       ASSESSMENT :  Based on the objective data described below, the patient presents with impaired gait, balance, pain, and overall high level functional mobility due to end stage degenerative joint disease in the right hip. Note: pt using a cane currently but just over last 2 weeks after ortho appt. Using in R hand and still somewhat unstable. Had pt try rolling walker and completed training with its use as she stated she felt much more safe and comfortable with it than the cane. She stated they would be purchasing one on the way home. Reviewed home safety and fall prevention. Discussed anticipated disposition to home with possible discharge within a 1 to 2 day time frame post-surgery. Patient and  in agreement. GOALS: (Goals have been discussed and agreed upon with patient.)  DISCHARGE GOALS: Time Frame: 1 DAY  Patient will demonstrate increased strength, range of motion, and pain control via a home exercise program in order to minimize functional deficits in preparation for their upcoming surgery. This will be achieved by using education, demonstration and through the use of an informational handout including a home exercise program.  REHABILITATION POTENTIAL FOR STATED GOALS: Good     RECOMMENDATIONS AND PLANNED INTERVENTIONS: (Benefits and precautions of physical therapy have been discussed with the patient.)  Home Exercise Program  TREATMENT PLAN EFFECTIVE DATES: 8/16/2022 TO 8/16/2022  FREQUENCY/DURATION: Patient to continue to perform home exercise program at least twice daily until her surgery.      SUBJECTIVE:   Patient stated It hurts when I walk on it.    OBJECTIVE DATA SUMMARY:   HISTORY:    Past Medical History:   Diagnosis Date    Anxiety state, unspecified     Asthma     as a child till age 13    Depression     Diverticulosis     DJD (Degenerative Joint Disease) of Knee     GERD (gastroesophageal reflux disease)     Hypercholesterolemia     Hypertension     Palmoplantar keratoderma     Prepyloric ulcer 2008     Past Surgical History:   Procedure Laterality Date    ENDOSCOPY, COLON, DIAGNOSTIC      due 2015    HX COLONOSCOPY       Prior Level of Function/Home Situation: Pt has remained mod I at home, has started to use a SPC in last 2 weeks after meeting with Dr. Gisselle Akers. She has had falls related to new puppy and water on the floor. She is indep in ADLs. She has a significant other who is with her and will assist after surgery. Personal factors and/or comorbidities impacting plan of care:     Patient []   does  [x]   does not state signs/symptoms of shortness of breath/dyspnea on exertion/respiratory distress. Home Situation  Home Environment: Private residence  # Steps to Enter: 4  Rails to Enter: Yes  Hand Rails : Left  One/Two Story Residence: One story  Living Alone: No  Support Systems: Spouse/Significant Other  Patient Expects to be Discharged to[de-identified] Home with home health  Current DME Used/Available at Home: Cane, quad  Tub or Shower Type: Tub/Shower combination      EXAMINATION/PRESENTATION/DECISION MAKING:     ADLs (Current Functional Status):    Bathing/Showering:   [x] Independent  [] Requires Assistance from Someone  [] Sponge Bath Only   Ambulation:  [x] Independent  [] Walk Indoors Only  [] Walk Outdoors  [x] Use Assistive Device  [] Use Wheelchair Only     Dressing:  [x] Independent    Requires Assistance from Someone for:  [] Sock/Shoes  [] Pants  [] Everything   Household Activities:  [x] Routine house and yard work  [] Light Housework Only  [] None       Critical Behavior:  Neurologic State: Alert  Orientation Level: Oriented X4  Cognition: Follows commands       Strength:    Strength: Generally decreased, functional                    Tone & Sensation:   Tone: Normal              Sensation: Intact               Range Of Motion:  AROM: Generally decreased, functional (R hip with pain)                       Coordination:  Coordination: Within functional limits    Functional Mobility:  Transfers:  Sit to Stand: Modified independent, Additional time  Stand to Sit: Modified independent, Additional time                       Balance:   Sitting: Intact  Standing: Impaired  Standing - Static: Good  Standing - Dynamic : Fair  Ambulation/Gait Training:  Distance (ft): 40 Feet (ft)  Assistive Device: Other (comment), Walker, rolling (initially with cane, then with rolling walker)  Ambulation - Level of Assistance: Modified independent        Gait Abnormalities: Antalgic, Decreased step clearance, Trunk sway increased (sway improved with use of walker)        Base of Support: Shift to left, Narrowed  Stance: Right decreased  Speed/Anne Marie: Slow, Pace decreased (<100 feet/min)  Step Length: Right shortened, Left shortened       Therapeutic Exercises:   The patient was educated in, has demonstrated, and has received written instructions to complete for their home exercise program per total hip replacement protocol. Functional Measure:  Timed up and go:    Timed Get Up And Go Test: 31.54 (however  with new use of walker)       < than 10 seconds=Normal  Greater then 13.5 seconds (in elderly)=Increased fall risk   Surendra LYONS, Kasey Lord. Predicting the probability for falls in community dwelling older adults using the Timed Up and Go Test. Phys Ther. 2000;80:896-903.           Pain:      0/10 at rest; varies up to 10/10 with amb                Activity Tolerance:   Limited by pain for amb  Patient []   does  [x]   does not demonstrate signs/symptoms of shortness of breath/dyspnea on exertion/respiratory distress. COMMUNICATION/EDUCATION:   The patient was educated on:  [x]         Early post operative mobility is imperative to achieve a patient's desired outcomes and to restore biological function. [x]         Post operative hip precautions may/may not be applicable. These precautions are based on the patient's physician and the procedure(s) performed. [x]         Anterior hip precautions including:        No hip extension        No external rotation        No crossing of the legs/midline    []         Posterior hip precautions including:        No hip flexion >90 degrees        No internal rotation        No crossing of the legs/midline    The patients plan of care was discussed as follows:   [x]         The patient verbalized understanding of her plan in preparation for their upcoming surgery  [x]         The patient's  was present for this session  []        The patient reports that he/she does not have a  identified at this time  [x]         The  verbalized understanding of the education regarding the patient's upcoming surgery  [x]         Patient/family agree to work toward stated goals and plan of care. []         Patient understands intent and goals of therapy, but is neutral about his/her participation. []         Patient is unable to participate in goal setting and plan of care.       Thank you for this referral.  Maryann Umana, PT   Time Calculation: 31 mins

## 2022-08-16 NOTE — PERIOP NOTES
Left message at 's office for posting to be updated to addend posting to include \"Hip\" in posting as it currently states Right total arthroplasty, direct anterior.

## 2022-08-16 NOTE — PERIOP NOTES
[facility]  Joint/Spine Preoperative Instructions        Surgery Date 8/23/22       Time of Arrival to be called @ [phone]     1. On the day of your surgery, please report to the Surgical Services Registration Desk and sign in at your designated time. The Surgery Center is located to the right of the Emergency Room. 2. You must have someone with you to drive you home. You should not drive a car for 24 hours following surgery. Please make arrangements for a friend or family member to stay with you for the first 24 hours after your surgery. 3. No food after midnight. Medications morning of surgery should be taken with a sip of water. Please follow pre-surgery drink instructions that were given at your Pre Admission Testing appointment. 4. We recommend you do not drink any alcoholic beverages for 24 hours before and after your surgery. 5. Contact your surgeons office for instructions on the following medications: non-steroidal anti-inflammatory drugs (i.e. Advil, Aleve), vitamins, and supplements. (Some surgeons will want you to stop these medications prior to surgery and others may allow you to take them)  **If you are currently taking Plavix, Coumadin, Aspirin and/or other blood-thinning agents, contact your surgeon for instructions. ** Your surgeon will partner with the physician prescribing these medications to determine if it is safe to stop or if you need to continue taking. Please do not stop taking these medications without instructions from your surgeon    6. Wear comfortable clothes. Wear glasses instead of contacts. Do not bring any money or jewelry. Please bring picture ID, insurance card, and any prearranged co-payment or hospital payment. Do not wear make-up, particularly mascara the morning of your surgery. Do not wear nail polish, particularly if you are having foot /hand surgery.   Wear your hair loose or down, no ponytails, buns, ronnie pins or clips. All body piercings must be removed. Please shower with antibacterial soap for three consecutive days before and on the morning of surgery, but do not apply any lotions, powders or deodorants after the shower on the day of surgery. Please use a fresh towels after each shower. Please sleep in clean clothes and change bed linens the night before surgery. Please do not shave for 48 hours prior to surgery. Shaving of the face is acceptable. 7. You should understand that if you do not follow these instructions your surgery may be cancelled. If your physical condition changes (I.e. fever, cold or flu) please contact your surgeon as soon as possible. 8. It is important that you be on time. If a situation occurs where you may be late, please call [phone] (OR Holding Area). 9. If you have any questions and or problems, please call [phone] (Pre-admission Testing). 10. Your surgery time may be subject to change. You will receive a phone call the evening prior if your time changes. 11.  If having outpatient surgery, you must have someone to drive you here, stay with you during the duration of your stay, and to drive you home at time of discharge. 12. The following link is for the educational video for patients and/or families. http://sagastume-starks.org/. com/locations/dazzsvijm-mqtcwpk-evjmsyj/[place]/educational-materials      Special Instructions: hold all vitamins supplements,  Aspirin and NSAIDS, 7 days prior to surgery       TAKE ALL MEDICATIONS THE DAY OF SURGERY EXCEPT:above meds    I understand a pre-operative phone call will be made to verify my surgery time. In the event that I am not available, I give permission for a message to be left on my answering service and/or with another person?   yes         ___________________      __________   _________    (Signature of Patient)             (Witness)                (Date and Time)

## 2022-08-16 NOTE — PERIOP NOTES
The [address] \"Your Path to a More Active Life\" orthopedic total knee or total hip educational video and the [facility] patient handbook provided & reviewed during the patients pre-admission testing (PAT) appointment. An opportunity for questions was provided, patient verbalized understanding.

## 2022-08-16 NOTE — PERIOP NOTES

## 2022-08-16 NOTE — PERIOP NOTES
Hibiclens/Chlorhexidine    Preventing Infections Before and After - Your Surgery    IMPORTANT INSTRUCTIONS    Please read and follow these instructions carefully. If you are unable to comply with the below instructions your procedure will be cancelled. Every Night for Three (3) nights before your surgery:  Shower with an antibacterial soap, such as Dial, or the soap provided at your preassessment appointment. A shower is better than a bath for cleaning your skin. If needed, ask someone to help you reach all areas of your body. Dont forget to clean your belly button with every shower. The night before your surgery: If you lose your Hibiclens/chlorhexidine please contact surgery center or you can purchase it at a local pharmacy  On the night before your surgery, shower with an antibacterial soap, such as Dial, or the soap provided at your preassessment appointment. With one packet of Hibiclens/Chlorhexidine in hand, turn water off. Apply Hibiclens antiseptic skin cleanser with a clean, freshly washed washcloth. Gently apply to your body from chin to toes (except the genital area) and especially the area(s) where your incision(s) will be. Leave Hibiclens/Chlorhexidine on your skin for at least 20 seconds. CAUTION: If needed, Hibiclens/chlorhexidine may be used to clean the folds of skin of the legs (such as in the area of the groin) and on your buttocks and hips. However, do not use Hibiclens/Chlorhexidine above the neck or in the genital area (your bottom) or put inside any area of your body. Turn the water back on and rinse. Dry gently with a clean, freshly washed towel. After your shower, do not use any powder, deodorant, perfumes or lotion. Use clean, freshly washed towels and washcloths every time you shower. Wear clean, freshly washed pajamas to bed the night before surgery. Sleep on clean, freshly washed sheets. Do not allow pets to sleep in your bed with you.         The Morning of your surgery:  Shower again thoroughly with an antibacterial soap, such as Dial or the soap provided at your preassessment appointment. If needed, ask someone for help to reach all areas of your body. Dont forget to clean your belly button! Rinse. Dry gently with a clean, freshly washed towel. After your shower, do not use any powder, deodorant, perfumes or lotion prior to surgery. Put on clean, freshly washed clothing. Tips to help prevent infections after your surgery:  Protect your surgical wound from germs:  Hand washing is the most important thing you and your caregivers can do to prevent infections. Keep your bandage clean and dry! Do not touch your surgical wound. Use clean, freshly washed towels and washcloths every time you shower; do not share bath linens with others. Until your surgical wound is healed, wear clothing and sleep on bed linens each day that are clean and freshly washed. Do not allow pets to sleep in your bed with you or touch your surgical wound. Do not smoke - smoking delays wound healing. This may be a good time to stop smoking. If you have diabetes, it is important for you to manage your blood sugar levels properly before your surgery as well as after your surgery. Poorly managed blood sugar levels slow down wound healing and prevent you from healing completely. If you lose your Hibiclens/chlorhexidine, please call the [facility], or it is available for purchase at your pharmacy.                ___________________      ___________________      ________________  (Signature of Patient)          (Witness)                   (Date and Time)

## 2022-08-16 NOTE — PERIOP NOTES

## 2022-08-17 LAB
BACTERIA SPEC CULT: NORMAL
BACTERIA SPEC CULT: NORMAL
SERVICE CMNT-IMP: NORMAL

## 2022-08-17 NOTE — ADVANCED PRACTICE NURSE
PAT Nurse Practitioner   Pre-Operative Chart Review/Assessment:-ORTHOPEDIC/NEUROSURGICAL SPINE                Patient Name:  Yasmani Luis                                                           Age:   69 y.o.    :  1955     Today's Date:  2022     Date of PAT:   22      Date of Surgery:    2022      Procedure(s):  Right  Total Hip Arthroplasty     Surgeon:   Bianka Bear     Medical Clearance:  Dr. Kelly Dates 22 at 1:00                   PLAN:      1)  Cardiac Clearance:  Not requested        2)  Program for Diabetes Health Consult:  Not indicated-A1C 5.8      3)  Sleep Apnea evaluation:   STOP BANG Score 2;  Snoring-denies, Apnea-denies               4) Treatment for MRSA/Staph Aureus:  Negative       5) Additional Concerns:  Anxiety, depression, GERD, PUD, former smoker, fall hx                 Vital Signs:         Visit Vitals  BP (!) 134/52 (BP 1 Location: Left upper arm, BP Patient Position: Sitting)   Pulse 80   Temp 98.8 °F (37.1 °C)   Resp 18   Ht 5' 4\" (1.626 m)   Wt 80.7 kg (177 lb 14.6 oz)   SpO2 99%   BMI 30.54 kg/m²                        ____________________________________________  PAST MEDICAL HISTORY  Past Medical History:   Diagnosis Date    Anxiety state, unspecified     Asthma     as a child till age 13    Depression     Diverticulosis     DJD (Degenerative Joint Disease) of Knee     GERD (gastroesophageal reflux disease)     Hypercholesterolemia     Hypertension     Palmoplantar keratoderma     Prepyloric ulcer       ____________________________________________  PAST SURGICAL HISTORY  Past Surgical History:   Procedure Laterality Date    ENDOSCOPY, COLON, DIAGNOSTIC      due     HX COLONOSCOPY        ____________________________________________  HOME MEDICATIONS    Current Outpatient Medications   Medication Sig    traMADoL (ULTRAM) 50 mg tablet Take 50 mg by mouth every six (6) hours as needed for Pain.     busPIRone (BUSPAR) 10 mg tablet Take  by mouth three (3) times daily as needed. biotin 5,000 mcg TbDi Take 1 Tablet by mouth. cholecalciferol (VITAMIN D3) (5000 Units/125 mcg) tab tablet Take 5,000 Units by mouth in the morning. ferrous fumarate/vit Bcomp,C (SUPER B COMPLEX PO) Take 1 Tablet by mouth. acetaminophen (TYLENOL) 500 mg tablet Take  by mouth every six (6) hours as needed for Pain. lisinopril-hydroCHLOROthiazide (PRINZIDE, ZESTORETIC) 20-25 mg per tablet Take 1 tablet by mouth once daily (Patient taking differently: Take 1 Tablet by mouth in the morning.)    MV-MN/FOLIC ACID/CALCIUM/VIT K (ONE-A-DAY WOMEN'S 50 PLUS PO) Take 1 Tab by mouth daily.      No current facility-administered medications for this encounter.      ____________________________________________  ALLERGIES  No Known Allergies   ____________________________________________  SOCIAL HISTORY  Social History     Tobacco Use    Smoking status: Former     Packs/day: 0.50     Years: 5.00     Pack years: 2.50     Types: Cigarettes     Quit date: 2000     Years since quittin.5    Smokeless tobacco: Never   Substance Use Topics    Alcohol use: No      ____________________________________________  COVID VACCINATION STATUS:      Internal Administration   First Dose      Second Dose         Last COVID Lab No results found for: [place], RCV2CT, CVD2M, COV2, XPLCVT, [id number], [id number], [address], [place], [id number] [address] [place]                   Labs:     Hospital Outpatient Visit on 2022   Component Date Value Ref Range Status    Crossmatch Expiration 2022,2359   Final    ABO/Rh(D) 2022 O POSITIVE   Final    Antibody screen 2022 NEG   Final    Sodium 2022 137  136 - 145 mmol/L Final    Potassium 2022 3.4 (A) 3.5 - 5.1 mmol/L Final    Chloride 2022 101  97 - 108 mmol/L Final    CO2 2022 30  21 - 32 mmol/L Final    Anion gap 2022 6  5 - 15 mmol/L Final    Glucose 2022 122 (A) 65 - 100 mg/dL Final BUN 08/16/2022 19  6 - 20 MG/DL Final    Creatinine 08/16/2022 1.13 (A) 0.55 - 1.02 MG/DL Final    BUN/Creatinine ratio 08/16/2022 17  12 - 20   Final    GFR est AA 08/16/2022 58 (A) >60 ml/min/1.73m2 Final    GFR est non-AA 08/16/2022 48 (A) >60 ml/min/1.73m2 Final    Estimated GFR is calculated using the IDMS-traceable Modification of Diet in Renal Disease (MDRD) Study equation, reported for both  Americans (GFRAA) and non- Americans (GFRNA), and normalized to 1.73m2 body surface area. The physician must decide which value applies to the patient. Calcium 08/16/2022 9.7  8.5 - 10.1 MG/DL Final    Bilirubin, total 08/16/2022 0.3  0.2 - 1.0 MG/DL Final    ALT (SGPT) 08/16/2022 20  12 - 78 U/L Final    AST (SGOT) 08/16/2022 16  15 - 37 U/L Final    Alk. phosphatase 08/16/2022 93  45 - 117 U/L Final    Protein, total 08/16/2022 8.5 (A) 6.4 - 8.2 g/dL Final    Albumin 08/16/2022 3.7  3.5 - 5.0 g/dL Final    Globulin 08/16/2022 4.8 (A) 2.0 - 4.0 g/dL Final    A-G Ratio 08/16/2022 0.8 (A) 1.1 - 2.2   Final    WBC 08/16/2022 6.2  3.6 - 11.0 K/uL Final    RBC 08/16/2022 4.63  3.80 - 5.20 M/uL Final    HGB 08/16/2022 13.1  11.5 - 16.0 g/dL Final    HCT 08/16/2022 38.7  35.0 - 47.0 % Final    MCV 08/16/2022 83.6  80.0 - 99.0 FL Final    MCH 08/16/2022 28.3  26.0 - 34.0 PG Final    MCHC 08/16/2022 33.9  30.0 - 36.5 g/dL Final    RDW 08/16/2022 13.7  11.5 - 14.5 % Final    PLATELET 34/73/4285 265  150 - 400 K/uL Final    MPV 08/16/2022 9.8  8.9 - 12.9 FL Final    NRBC 08/16/2022 0.0  0  WBC Final    ABSOLUTE NRBC 08/16/2022 0.00  0.00 - 0.01 K/uL Final    INR 08/16/2022 1.0  0.9 - 1.1   Final    A single therapeutic range for Vit K antagonists may not be optimal for all indications - see June, 2008 issue of Chest, American College of Chest Physicians Evidence-Based Clinical Practice Guidelines, 8th Edition.     Prothrombin time 08/16/2022 10.4  9.0 - 11.1 sec Final    Color 08/16/2022 YELLOW/STRAW Final    Color Reference Range: Straw, Yellow or Dark Yellow    Appearance 08/16/2022 CLEAR  CLEAR   Final    Specific gravity 08/16/2022 1.016    Final    pH (UA) 08/16/2022 6.0  5.0 - 8.0   Final    Protein 08/16/2022 Negative  NEG mg/dL Final    Glucose 08/16/2022 Negative  NEG mg/dL Final    Ketone 08/16/2022 Negative  NEG mg/dL Final    Bilirubin 08/16/2022 Negative  NEG   Final    Blood 08/16/2022 Negative  NEG   Final    Urobilinogen 08/16/2022 1.0  0.2 - 1.0 EU/dL Final    Nitrites 08/16/2022 Negative  NEG   Final    Leukocyte Esterase 08/16/2022 TRACE (A) NEG   Final    UA:UC IF INDICATED 08/16/2022 CULTURE NOT INDICATED BY UA RESULT    Final    WBC 08/16/2022 0-4  0 - 4 /hpf Final    RBC 08/16/2022 0-5  0 - 5 /hpf Final    Epithelial cells 08/16/2022 MODERATE (A) FEW /lpf Final    Epithelial cell category consists of squamous cells and /or transitional urothelial cells. Renal tubular cells, if present, are separately identified as such.     Bacteria 08/16/2022 1+ (A) NEG /hpf Final    Hyaline cast 08/16/2022 0-2  0 - 2 /lpf Final    Ventricular Rate 08/16/2022 72  BPM Final    Atrial Rate 08/16/2022 72  BPM Final    P-R Interval 08/16/2022 156  ms Final    QRS Duration 08/16/2022 80  ms Final    Q-T Interval 08/16/2022 384  ms Final    QTC Calculation (Bezet) 08/16/2022 420  ms Final    Calculated P Axis 08/16/2022 97  degrees Final    Calculated R Axis 08/16/2022 -12  degrees Final    Calculated T Axis 08/16/2022 22  degrees Final    Diagnosis 08/16/2022    Final                    Value:Normal sinus rhythm      Confirmed by Justin Samano MD, [place] [facility] ([id number]) on 8/16/2022 1:57:59 PM      Hemoglobin A1c 08/16/2022 5.8 (A) 4.0 - 5.6 % Final    Comment: NEW METHOD  PLEASE NOTE NEW REFERENCE RANGE  (NOTE)  HbA1C Interpretive Ranges  <5.7              Normal  5.7 - 6.4         Consider Prediabetes  >6.5              Consider Diabetes      Est. average glucose 08/16/2022 120  mg/dL Final    Special Requests: 08/16/2022 NO SPECIAL REQUESTS    Final    Culture result: 08/16/2022 MRSA NOT PRESENT    Final    Culture result: 08/16/2022     Final                    Value:Screening of patient nares for MRSA is for surveillance purposes and, if positive, to facilitate isolation considerations in high risk settings. It is not intended for automatic decolonization interventions per se as regimens are not sufficiently effective to warrant routine use. XR Results (most recent):    Results from Hospital Encounter encounter on 07/21/22    XR HIP RT W OR WO PELV 2-3 VWS    Narrative  EXAM: XR HIP RT W OR WO PELV 2-3 VWS    INDICATION: pain. COMPARISON: None. FINDINGS: AP view of the pelvis and a frogleg lateral view of the right hip  demonstrate no fracture, dislocation or other acute abnormality. There is severe  chronic degenerative disease of the right hip with loss of joint space spur  formation and subchondral changes, significant asymmetry compared to the left  and progression since the March 2020 examination. Impression  No acute abnormality. Progression of severe right hip DJD. Skin:   Denies open wounds, cuts, sores, rashes or other areas of concern in PAT assessment.         Job Milan, NP

## 2022-08-23 ENCOUNTER — HOSPITAL ENCOUNTER (OUTPATIENT)
Age: 67
Discharge: HOME | End: 2022-08-23
Attending: ORTHOPAEDIC SURGERY | Admitting: ORTHOPAEDIC SURGERY
Payer: MEDICARE

## 2022-08-23 ENCOUNTER — ANESTHESIA (OUTPATIENT)
Dept: SURGERY | Age: 67
End: 2022-08-23
Payer: MEDICARE

## 2022-08-23 ENCOUNTER — ANESTHESIA EVENT (OUTPATIENT)
Dept: SURGERY | Age: 67
End: 2022-08-23
Payer: MEDICARE

## 2022-08-23 ENCOUNTER — APPOINTMENT (OUTPATIENT)
Dept: GENERAL RADIOLOGY | Age: 67
End: 2022-08-23
Attending: ORTHOPAEDIC SURGERY
Payer: MEDICARE

## 2022-08-23 VITALS
DIASTOLIC BLOOD PRESSURE: 72 MMHG | WEIGHT: 177.91 LBS | OXYGEN SATURATION: 100 % | RESPIRATION RATE: 20 BRPM | HEART RATE: 81 BPM | TEMPERATURE: 97.8 F | SYSTOLIC BLOOD PRESSURE: 150 MMHG | BODY MASS INDEX: 30.54 KG/M2

## 2022-08-23 DIAGNOSIS — Z96.641 STATUS POST TOTAL REPLACEMENT OF RIGHT HIP: Primary | ICD-10-CM

## 2022-08-23 PROCEDURE — 73501 X-RAY EXAM HIP UNI 1 VIEW: CPT

## 2022-08-23 PROCEDURE — 77030038692 HC WND DEB SYS IRMX -B: Performed by: ORTHOPAEDIC SURGERY

## 2022-08-23 PROCEDURE — 97161 PT EVAL LOW COMPLEX 20 MIN: CPT

## 2022-08-23 PROCEDURE — 2709999900 HC NON-CHARGEABLE SUPPLY: Performed by: ORTHOPAEDIC SURGERY

## 2022-08-23 PROCEDURE — 74011250636 HC RX REV CODE- 250/636: Performed by: ANESTHESIOLOGY

## 2022-08-23 PROCEDURE — 76060000033 HC ANESTHESIA 1 TO 1.5 HR: Performed by: ORTHOPAEDIC SURGERY

## 2022-08-23 PROCEDURE — 77030002933 HC SUT MCRYL J&J -A: Performed by: ORTHOPAEDIC SURGERY

## 2022-08-23 PROCEDURE — 77030011264 HC ELECTRD BLD EXT COVD -A: Performed by: ORTHOPAEDIC SURGERY

## 2022-08-23 PROCEDURE — 74011000250 HC RX REV CODE- 250: Performed by: ORTHOPAEDIC SURGERY

## 2022-08-23 PROCEDURE — 76010000161 HC OR TIME 1 TO 1.5 HR INTENSV-TIER 1: Performed by: ORTHOPAEDIC SURGERY

## 2022-08-23 PROCEDURE — 74011250637 HC RX REV CODE- 250/637: Performed by: ORTHOPAEDIC SURGERY

## 2022-08-23 PROCEDURE — 74011000258 HC RX REV CODE- 258: Performed by: ORTHOPAEDIC SURGERY

## 2022-08-23 PROCEDURE — 97530 THERAPEUTIC ACTIVITIES: CPT

## 2022-08-23 PROCEDURE — 77030031139 HC SUT VCRL2 J&J -A: Performed by: ORTHOPAEDIC SURGERY

## 2022-08-23 PROCEDURE — 97535 SELF CARE MNGMENT TRAINING: CPT

## 2022-08-23 PROCEDURE — 77030018673: Performed by: ORTHOPAEDIC SURGERY

## 2022-08-23 PROCEDURE — 27130 TOTAL HIP ARTHROPLASTY: CPT | Performed by: ORTHOPAEDIC SURGERY

## 2022-08-23 PROCEDURE — 72170 X-RAY EXAM OF PELVIS: CPT

## 2022-08-23 PROCEDURE — 74011250636 HC RX REV CODE- 250/636: Performed by: ORTHOPAEDIC SURGERY

## 2022-08-23 PROCEDURE — 97165 OT EVAL LOW COMPLEX 30 MIN: CPT

## 2022-08-23 PROCEDURE — C1776 JOINT DEVICE (IMPLANTABLE): HCPCS | Performed by: ORTHOPAEDIC SURGERY

## 2022-08-23 PROCEDURE — 77030010507 HC ADH SKN DERMBND J&J -B: Performed by: ORTHOPAEDIC SURGERY

## 2022-08-23 PROCEDURE — 77030006835 HC BLD SAW SAG STRY -B: Performed by: ORTHOPAEDIC SURGERY

## 2022-08-23 PROCEDURE — 97116 GAIT TRAINING THERAPY: CPT

## 2022-08-23 PROCEDURE — 76210000017 HC OR PH I REC 1.5 TO 2 HR: Performed by: ORTHOPAEDIC SURGERY

## 2022-08-23 PROCEDURE — 76000 FLUOROSCOPY <1 HR PHYS/QHP: CPT

## 2022-08-23 PROCEDURE — 77030013079 HC BLNKT BAIR HGGR 3M -A: Performed by: ANESTHESIOLOGY

## 2022-08-23 PROCEDURE — 74011000250 HC RX REV CODE- 250: Performed by: ANESTHESIOLOGY

## 2022-08-23 PROCEDURE — 76210000063 HC OR PH I REC FIRST 0.5 HR: Performed by: ORTHOPAEDIC SURGERY

## 2022-08-23 DEVICE — TRIDENT II CLUSTERHOLE HA ACETABULAR SHELL 52E
Type: IMPLANTABLE DEVICE | Site: HIP | Status: FUNCTIONAL
Brand: TRIDENT II

## 2022-08-23 DEVICE — HIP H2 TOT ADV OTHER HD -- IMPL CAPPED H2: Type: IMPLANTABLE DEVICE | Status: FUNCTIONAL

## 2022-08-23 DEVICE — 127 DEGREE NECK ANGLE HIP STEM
Type: IMPLANTABLE DEVICE | Site: HIP | Status: FUNCTIONAL
Brand: ACCOLADE

## 2022-08-23 DEVICE — CERAMIC V40 FEMORAL HEAD
Type: IMPLANTABLE DEVICE | Site: HIP | Status: FUNCTIONAL
Brand: BIOLOX

## 2022-08-23 DEVICE — TRIDENT X3 0 DEGREE POLYETHYLENE INSERT
Type: IMPLANTABLE DEVICE | Site: HIP | Status: FUNCTIONAL
Brand: TRIDENT X3 INSERT

## 2022-08-23 RX ORDER — ACETAMINOPHEN 500 MG
1000 TABLET ORAL ONCE
Status: COMPLETED | OUTPATIENT
Start: 2022-08-23 | End: 2022-08-23

## 2022-08-23 RX ORDER — DIPHENHYDRAMINE HYDROCHLORIDE 50 MG/ML
12.5 INJECTION, SOLUTION INTRAMUSCULAR; INTRAVENOUS
Status: DISCONTINUED | OUTPATIENT
Start: 2022-08-23 | End: 2022-08-23 | Stop reason: HOSPADM

## 2022-08-23 RX ORDER — SODIUM CHLORIDE 9 MG/ML
125 INJECTION, SOLUTION INTRAVENOUS CONTINUOUS
Status: DISCONTINUED | OUTPATIENT
Start: 2022-08-23 | End: 2022-08-23 | Stop reason: HOSPADM

## 2022-08-23 RX ORDER — SODIUM CHLORIDE 0.9 % (FLUSH) 0.9 %
5-40 SYRINGE (ML) INJECTION AS NEEDED
Status: DISCONTINUED | OUTPATIENT
Start: 2022-08-23 | End: 2022-08-23 | Stop reason: HOSPADM

## 2022-08-23 RX ORDER — SODIUM CHLORIDE, SODIUM LACTATE, POTASSIUM CHLORIDE, CALCIUM CHLORIDE 600; 310; 30; 20 MG/100ML; MG/100ML; MG/100ML; MG/100ML
25 INJECTION, SOLUTION INTRAVENOUS CONTINUOUS
Status: DISCONTINUED | OUTPATIENT
Start: 2022-08-23 | End: 2022-08-23 | Stop reason: HOSPADM

## 2022-08-23 RX ORDER — SODIUM CHLORIDE 0.9 % (FLUSH) 0.9 %
5-40 SYRINGE (ML) INJECTION EVERY 8 HOURS
Status: DISCONTINUED | OUTPATIENT
Start: 2022-08-23 | End: 2022-08-23 | Stop reason: HOSPADM

## 2022-08-23 RX ORDER — FACIAL-BODY WIPES
10 EACH TOPICAL DAILY PRN
Status: DISCONTINUED | OUTPATIENT
Start: 2022-08-25 | End: 2022-08-23 | Stop reason: HOSPADM

## 2022-08-23 RX ORDER — AMOXICILLIN 250 MG
1 CAPSULE ORAL 2 TIMES DAILY
Status: DISCONTINUED | OUTPATIENT
Start: 2022-08-23 | End: 2022-08-23 | Stop reason: HOSPADM

## 2022-08-23 RX ORDER — ONDANSETRON 2 MG/ML
4 INJECTION INTRAMUSCULAR; INTRAVENOUS AS NEEDED
Status: DISCONTINUED | OUTPATIENT
Start: 2022-08-23 | End: 2022-08-23 | Stop reason: HOSPADM

## 2022-08-23 RX ORDER — HYDROMORPHONE HYDROCHLORIDE 1 MG/ML
0.2 INJECTION, SOLUTION INTRAMUSCULAR; INTRAVENOUS; SUBCUTANEOUS
Status: DISCONTINUED | OUTPATIENT
Start: 2022-08-23 | End: 2022-08-23 | Stop reason: HOSPADM

## 2022-08-23 RX ORDER — FAMOTIDINE 20 MG/1
20 TABLET, FILM COATED ORAL 2 TIMES DAILY
Qty: 60 TABLET | Refills: 0 | Status: SHIPPED | OUTPATIENT
Start: 2022-08-23 | End: 2022-09-22

## 2022-08-23 RX ORDER — BUPIVACAINE HYDROCHLORIDE 5 MG/ML
INJECTION, SOLUTION EPIDURAL; INTRACAUDAL
Status: COMPLETED | OUTPATIENT
Start: 2022-08-23 | End: 2022-08-23

## 2022-08-23 RX ORDER — OXYCODONE HYDROCHLORIDE 5 MG/1
5-10 TABLET ORAL
Qty: 40 TABLET | Refills: 0 | Status: SHIPPED | OUTPATIENT
Start: 2022-08-23 | End: 2022-08-30

## 2022-08-23 RX ORDER — TRAMADOL HYDROCHLORIDE 50 MG/1
50 TABLET ORAL
Status: DISCONTINUED | OUTPATIENT
Start: 2022-08-23 | End: 2022-08-23 | Stop reason: HOSPADM

## 2022-08-23 RX ORDER — ONDANSETRON 2 MG/ML
INJECTION INTRAMUSCULAR; INTRAVENOUS AS NEEDED
Status: DISCONTINUED | OUTPATIENT
Start: 2022-08-23 | End: 2022-08-23 | Stop reason: HOSPADM

## 2022-08-23 RX ORDER — DEXAMETHASONE SODIUM PHOSPHATE 100 MG/10ML
10 INJECTION INTRAMUSCULAR; INTRAVENOUS ONCE
Status: DISCONTINUED | OUTPATIENT
Start: 2022-08-23 | End: 2022-08-23

## 2022-08-23 RX ORDER — OXYCODONE HYDROCHLORIDE 5 MG/1
10 TABLET ORAL
Status: DISCONTINUED | OUTPATIENT
Start: 2022-08-23 | End: 2022-08-23 | Stop reason: HOSPADM

## 2022-08-23 RX ORDER — ACETAMINOPHEN 325 MG/1
650 TABLET ORAL
Status: DISCONTINUED | OUTPATIENT
Start: 2022-08-23 | End: 2022-08-23 | Stop reason: HOSPADM

## 2022-08-23 RX ORDER — POLYETHYLENE GLYCOL 3350 17 G/17G
17 POWDER, FOR SOLUTION ORAL DAILY
Qty: 7 PACKET | Refills: 0 | Status: SHIPPED | OUTPATIENT
Start: 2022-08-24 | End: 2022-08-31

## 2022-08-23 RX ORDER — ASPIRIN 325 MG
325 TABLET, DELAYED RELEASE (ENTERIC COATED) ORAL 2 TIMES DAILY
Qty: 60 TABLET | Refills: 0 | Status: SHIPPED | OUTPATIENT
Start: 2022-08-23 | End: 2022-09-22

## 2022-08-23 RX ORDER — BUSPIRONE HYDROCHLORIDE 10 MG/1
10 TABLET ORAL 3 TIMES DAILY
Status: DISCONTINUED | OUTPATIENT
Start: 2022-08-23 | End: 2022-08-23 | Stop reason: HOSPADM

## 2022-08-23 RX ORDER — ASPIRIN 325 MG
325 TABLET, DELAYED RELEASE (ENTERIC COATED) ORAL 2 TIMES DAILY
Status: DISCONTINUED | OUTPATIENT
Start: 2022-08-23 | End: 2022-08-23 | Stop reason: HOSPADM

## 2022-08-23 RX ORDER — AMOXICILLIN 250 MG
1 CAPSULE ORAL 2 TIMES DAILY
Qty: 14 TABLET | Refills: 0 | Status: SHIPPED | OUTPATIENT
Start: 2022-08-23 | End: 2022-08-30

## 2022-08-23 RX ORDER — SODIUM CHLORIDE 9 MG/ML
INJECTION, SOLUTION INTRAVENOUS
Status: COMPLETED | OUTPATIENT
Start: 2022-08-23 | End: 2022-08-23

## 2022-08-23 RX ORDER — ONDANSETRON 2 MG/ML
4 INJECTION INTRAMUSCULAR; INTRAVENOUS
Status: DISCONTINUED | OUTPATIENT
Start: 2022-08-23 | End: 2022-08-23 | Stop reason: HOSPADM

## 2022-08-23 RX ORDER — FENTANYL CITRATE 50 UG/ML
25 INJECTION, SOLUTION INTRAMUSCULAR; INTRAVENOUS
Status: DISCONTINUED | OUTPATIENT
Start: 2022-08-23 | End: 2022-08-23 | Stop reason: HOSPADM

## 2022-08-23 RX ORDER — ACETAMINOPHEN 500 MG
1000 TABLET ORAL EVERY 6 HOURS
Status: DISCONTINUED | OUTPATIENT
Start: 2022-08-23 | End: 2022-08-23 | Stop reason: HOSPADM

## 2022-08-23 RX ORDER — FAMOTIDINE 20 MG/1
20 TABLET, FILM COATED ORAL 2 TIMES DAILY
Status: DISCONTINUED | OUTPATIENT
Start: 2022-08-23 | End: 2022-08-23 | Stop reason: HOSPADM

## 2022-08-23 RX ORDER — DEXAMETHASONE SODIUM PHOSPHATE 4 MG/ML
INJECTION, SOLUTION INTRA-ARTICULAR; INTRALESIONAL; INTRAMUSCULAR; INTRAVENOUS; SOFT TISSUE AS NEEDED
Status: DISCONTINUED | OUTPATIENT
Start: 2022-08-23 | End: 2022-08-23 | Stop reason: HOSPADM

## 2022-08-23 RX ORDER — PROPOFOL 10 MG/ML
INJECTION, EMULSION INTRAVENOUS
Status: DISCONTINUED | OUTPATIENT
Start: 2022-08-23 | End: 2022-08-23 | Stop reason: HOSPADM

## 2022-08-23 RX ORDER — HYDROMORPHONE HYDROCHLORIDE 1 MG/ML
0.5 INJECTION, SOLUTION INTRAMUSCULAR; INTRAVENOUS; SUBCUTANEOUS
Status: DISCONTINUED | OUTPATIENT
Start: 2022-08-23 | End: 2022-08-23 | Stop reason: HOSPADM

## 2022-08-23 RX ORDER — POLYETHYLENE GLYCOL 3350 17 G/17G
17 POWDER, FOR SOLUTION ORAL DAILY
Status: DISCONTINUED | OUTPATIENT
Start: 2022-08-24 | End: 2022-08-23 | Stop reason: HOSPADM

## 2022-08-23 RX ORDER — MIDAZOLAM HYDROCHLORIDE 1 MG/ML
INJECTION, SOLUTION INTRAMUSCULAR; INTRAVENOUS AS NEEDED
Status: DISCONTINUED | OUTPATIENT
Start: 2022-08-23 | End: 2022-08-23 | Stop reason: HOSPADM

## 2022-08-23 RX ORDER — ROPIVACAINE HYDROCHLORIDE 5 MG/ML
INJECTION, SOLUTION EPIDURAL; INFILTRATION; PERINEURAL AS NEEDED
Status: DISCONTINUED | OUTPATIENT
Start: 2022-08-23 | End: 2022-08-23 | Stop reason: HOSPADM

## 2022-08-23 RX ORDER — GLYCOPYRROLATE 0.2 MG/ML
INJECTION INTRAMUSCULAR; INTRAVENOUS AS NEEDED
Status: DISCONTINUED | OUTPATIENT
Start: 2022-08-23 | End: 2022-08-23 | Stop reason: HOSPADM

## 2022-08-23 RX ORDER — EPHEDRINE SULFATE/0.9% NACL/PF 50 MG/5 ML
SYRINGE (ML) INTRAVENOUS AS NEEDED
Status: DISCONTINUED | OUTPATIENT
Start: 2022-08-23 | End: 2022-08-23 | Stop reason: HOSPADM

## 2022-08-23 RX ORDER — DEXAMETHASONE SODIUM PHOSPHATE 4 MG/ML
10 INJECTION, SOLUTION INTRA-ARTICULAR; INTRALESIONAL; INTRAMUSCULAR; INTRAVENOUS; SOFT TISSUE ONCE
Status: DISCONTINUED | OUTPATIENT
Start: 2022-08-23 | End: 2022-08-23 | Stop reason: HOSPADM

## 2022-08-23 RX ORDER — CELECOXIB 200 MG/1
200 CAPSULE ORAL ONCE
Status: COMPLETED | OUTPATIENT
Start: 2022-08-23 | End: 2022-08-23

## 2022-08-23 RX ORDER — NALOXONE HYDROCHLORIDE 0.4 MG/ML
0.4 INJECTION, SOLUTION INTRAMUSCULAR; INTRAVENOUS; SUBCUTANEOUS AS NEEDED
Status: DISCONTINUED | OUTPATIENT
Start: 2022-08-23 | End: 2022-08-23 | Stop reason: HOSPADM

## 2022-08-23 RX ADMIN — ACETAMINOPHEN 1000 MG: 500 TABLET ORAL at 15:35

## 2022-08-23 RX ADMIN — PROPOFOL 40 MCG/KG/MIN: 10 INJECTION, EMULSION INTRAVENOUS at 07:31

## 2022-08-23 RX ADMIN — ASPIRIN 325 MG: 325 TABLET, COATED ORAL at 15:34

## 2022-08-23 RX ADMIN — WATER 2 G: 1 INJECTION INTRAMUSCULAR; INTRAVENOUS; SUBCUTANEOUS at 07:34

## 2022-08-23 RX ADMIN — SODIUM CHLORIDE 125 ML/HR: 9 INJECTION, SOLUTION INTRAVENOUS at 08:53

## 2022-08-23 RX ADMIN — ONDANSETRON HYDROCHLORIDE 4 MG: 2 INJECTION, SOLUTION INTRAMUSCULAR; INTRAVENOUS at 08:08

## 2022-08-23 RX ADMIN — CEFAZOLIN 2 G: 1 INJECTION, POWDER, FOR SOLUTION INTRAMUSCULAR; INTRAVENOUS at 15:36

## 2022-08-23 RX ADMIN — OXYCODONE 10 MG: 5 TABLET ORAL at 15:34

## 2022-08-23 RX ADMIN — DEXAMETHASONE SODIUM PHOSPHATE 8 MG: 4 INJECTION, SOLUTION INTRAMUSCULAR; INTRAVENOUS at 07:34

## 2022-08-23 RX ADMIN — BUSPIRONE HYDROCHLORIDE 10 MG: 10 TABLET ORAL at 15:34

## 2022-08-23 RX ADMIN — SENNOSIDES AND DOCUSATE SODIUM 1 TABLET: 50; 8.6 TABLET ORAL at 15:34

## 2022-08-23 RX ADMIN — SODIUM CHLORIDE 80 MCG: 900 INJECTION, SOLUTION INTRAVENOUS at 08:03

## 2022-08-23 RX ADMIN — TRANEXAMIC ACID 1 G: 100 INJECTION, SOLUTION INTRAVENOUS at 07:34

## 2022-08-23 RX ADMIN — GLYCOPYRROLATE 0.1 MG: 0.2 INJECTION, SOLUTION INTRAMUSCULAR; INTRAVENOUS at 07:50

## 2022-08-23 RX ADMIN — BUPIVACAINE HYDROCHLORIDE 10 MG: 5 INJECTION, SOLUTION EPIDURAL; INTRACAUDAL; PERINEURAL at 07:17

## 2022-08-23 RX ADMIN — ACETAMINOPHEN 1000 MG: 500 TABLET ORAL at 06:32

## 2022-08-23 RX ADMIN — SODIUM CHLORIDE 40 MCG: 900 INJECTION, SOLUTION INTRAVENOUS at 08:21

## 2022-08-23 RX ADMIN — Medication 3 AMPULE: at 06:32

## 2022-08-23 RX ADMIN — Medication 10 MG: at 08:21

## 2022-08-23 RX ADMIN — MIDAZOLAM HYDROCHLORIDE 2 MG: 1 INJECTION, SOLUTION INTRAMUSCULAR; INTRAVENOUS at 07:15

## 2022-08-23 RX ADMIN — FAMOTIDINE 20 MG: 20 TABLET, FILM COATED ORAL at 15:35

## 2022-08-23 RX ADMIN — PROPOFOL 20 MG: 10 INJECTION, EMULSION INTRAVENOUS at 07:50

## 2022-08-23 RX ADMIN — SODIUM CHLORIDE 80 MCG: 900 INJECTION, SOLUTION INTRAVENOUS at 08:15

## 2022-08-23 RX ADMIN — SODIUM CHLORIDE 30 MCG/MIN: 900 INJECTION, SOLUTION INTRAVENOUS at 07:31

## 2022-08-23 RX ADMIN — SODIUM CHLORIDE, POTASSIUM CHLORIDE, SODIUM LACTATE AND CALCIUM CHLORIDE: 600; 310; 30; 20 INJECTION, SOLUTION INTRAVENOUS at 08:26

## 2022-08-23 RX ADMIN — SODIUM CHLORIDE, POTASSIUM CHLORIDE, SODIUM LACTATE AND CALCIUM CHLORIDE 25 ML/HR: 600; 310; 30; 20 INJECTION, SOLUTION INTRAVENOUS at 06:32

## 2022-08-23 RX ADMIN — CELECOXIB 200 MG: 200 CAPSULE ORAL at 06:32

## 2022-08-23 RX ADMIN — SODIUM CHLORIDE, PRESERVATIVE FREE 10 ML: 5 INJECTION INTRAVENOUS at 14:17

## 2022-08-23 NOTE — ANESTHESIA PREPROCEDURE EVALUATION
Relevant Problems   NEUROLOGY   (+) Depression      CARDIOVASCULAR   (+) Hypertension      GASTROINTESTINAL   (+) GERD (gastroesophageal reflux disease)   (+) Prepyloric ulcer       Anesthetic History               Review of Systems / Medical History      Pulmonary            Asthma : well controlled       Neuro/Psych         Dementia     Cardiovascular    Hypertension: well controlled              Exercise tolerance: >4 METS     GI/Hepatic/Renal     GERD      PUD     Endo/Other        Arthritis     Other Findings              Physical Exam    Airway  Mallampati: II  TM Distance: > 6 cm  Neck ROM: normal range of motion   Mouth opening: Normal     Cardiovascular  Regular rate and rhythm,  S1 and S2 normal,  no murmur, click, rub, or gallop  Rhythm: regular  Rate: normal         Dental    Dentition: Upper partial plate     Pulmonary  Breath sounds clear to auscultation               Abdominal  GI exam deferred       Other Findings            Anesthetic Plan    ASA: 2  Anesthesia type: spinal and general - backup          Induction: Intravenous  Anesthetic plan and risks discussed with: Patient

## 2022-08-23 NOTE — PERIOP NOTES
Irrisept Wound Debridement and Cleansing System  Ref: Paras Dunn: [id number] LOT: [id number] Expiration Date: 05/31/2024

## 2022-08-23 NOTE — PROGRESS NOTES
OCCUPATIONAL THERAPY EVALUATION WITH DISCHARGE  Patient: Serafin Andrea (6 y.o. female)  Date: 8/23/2022  Primary Diagnosis: Unilateral primary osteoarthritis, right hip [M16.11]  Procedure(s) (LRB):  RIGHT TOTAL HIP ARTHROPLASTY, DIRECT ANTERIOR (Right) Day of Surgery   Precautions:        ASSESSMENT :  Based on the objective data described below, patient presents with Independent upper body ADLs, Modified independent lower body ADLs, and Supervision functional mobility. Discharge recommendations: None     Equipment recommendations for successful discharge (if) home: none     PLAN :  Further skilled acute occupational therapy services are not indicated at this time. SUBJECTIVE:   Patient stated I have a rolling walker at home .     OBJECTIVE DATA SUMMARY:   HISTORY:   Past Medical History:   Diagnosis Date    Anxiety state, unspecified     Asthma     as a child till age 13    Depression     Diverticulosis     DJD (Degenerative Joint Disease) of Knee     GERD (gastroesophageal reflux disease)     Hypercholesterolemia     Hypertension     Palmoplantar keratoderma     Prepyloric ulcer 2008     Past Surgical History:   Procedure Laterality Date    ENDOSCOPY, COLON, DIAGNOSTIC      due 2015    HX COLONOSCOPY         Prior Level of Function/Environment/Context: pt lives at home with family and was independent with ADLs and ILS and using SPC for approx 2 weeks.   Occupations in which the patient is/was successful, what are the barriers preventing that success:   Performance Patterns (routines, roles, habits, and rituals):   Personal Interests and/or values:   Expanded or extensive additional review of patient history:     Home Situation  Support Systems: Spouse/Significant Other  Patient Expects to be Discharged to[de-identified] Home with home health    Hand dominance: Right    EXAMINATION OF PERFORMANCE DEFICITS:  Cognitive/Behavioral Status:  Neurologic State: Alert  Orientation Level: Oriented X4  Cognition: Follows commands  Perception: Appears intact  Perseveration: No perseveration noted  Safety/Judgement: Awareness of environment    Skin: in fair health     Edema: none     Hearing:intact       Vision/Perceptual:                 Intact                     Range of Motion:    AROM: Generally decreased, functional                         Strength:    Strength: Generally decreased, functional                Coordination:  Coordination: Within functional limits  Fine Motor Skills-Upper: Left Intact; Right Intact    Gross Motor Skills-Upper: Left Intact; Right Intact    Tone & Sensation:    Tone: Normal  Sensation: Intact                      Balance:  Sitting: Intact; Without support  Standing: Intact; With support  Standing - Static: Good;Constant support  Standing - Dynamic : Good;Fair;Constant support    Functional Mobility and Transfers for ADLs:  Bed Mobility:  Rolling: Supervision  Supine to Sit: Supervision  Scooting: Supervision    Transfers:  Sit to Stand: Stand-by assistance  Stand to Sit: Supervision;Stand-by assistance  Bed to Chair: Stand-by assistance  Bathroom Mobility: Contact guard assistance;Stand-by assistance  Toilet Transfer : Contact guard assistance;Stand-by assistance    ADL Assessment:  Feeding: Independent    Oral Facial Hygiene/Grooming: Independent    Bathing: Contact guard assistance;Stand-by assistance    Upper Body Dressing: Independent    Lower Body Dressing: Modified independent    Toileting: Independent           Cognitive Retraining  Safety/Judgement: Awareness of environment    The patient instructed and demonstrated hip contraindications Right LE first/last verbal cues. Bathing: Patient instructed when bathing to not submerge wound in water, stand to shower or sponge bathe, cover wound with plastic and tape to ensure no water reaches bandage/wound without cues. Patient indicated understanding. Dressing joint: Patient instructed and demonstrated to don/doff Right LE first/last verbal cues. Patient instructed and demonstrated to don all clothing while sitting prior to standing, doff all clothing to knees while standing, then sit to doff clothing off from knees to feet in order to facilitate fall prevention, pain management, and energy conservation with Modified independent. Home safety: Patient instructed on home modifications and safety (raise height of ADL objects, appropriate height of chair surfaces, recliner safety, change of floor surfaces, clear pathways) to increase independence and fall prevention. Patient indicated understanding. Standing: Patient instructed and demonstrated to walk up to sink/counter top/surfaces, step into walker to increase safety of joint and fall prevention with Supervision. Instructed to apply concept of hip contraindications to ADLs within the home (no reaching across body to Right side, square off while using objects, slide objects along surfaces). Patient instructed to increase amount of time standing, observe standing position during ADLs in order to increase even weight bearing through bilateral LEs in order to increase independence with ADLs. Goal to be reached 30 days post - op, per orthopedic surgeon or per PT. Patient indicated understanding. Tub transfer: Patient instructed regarding when it is safe to begin transfer into tub (complete stairs with PT, advance exercises with PT high enough to clear tub height). Patient instructed to use the same technique as used with stairs when entering and exiting tub (\"up with the non-surgical, down with the surgical leg\"). Patient indicated understanding. Functional Measure:    Barthel Index:  Bathin  Bladder: 10  Bowels: 10  Groomin  Dressing: 10 (use of AE)  Feeding: 10  Mobility: 15  Stairs: 5  Toilet Use: 5  Transfer (Bed to Chair and Back): 15  Total: 85/100      The Barthel ADL Index: Guidelines  1.  The index should be used as a record of what a patient does, not as a record of what a patient could do. 2. The main aim is to establish degree of independence from any help, physical or verbal, however minor and for whatever reason. 3. The need for supervision renders the patient not independent. 4. A patient's performance should be established using the best available evidence. Asking the patient, friends/relatives and nurses are the usual sources, but direct observation and common sense are also important. However direct testing is not needed. 5. Usually the patient's performance over the preceding 24-48 hours is important, but occasionally longer periods will be relevant. 6. Middle categories imply that the patient supplies over 50 per cent of the effort. 7. Use of aids to be independent is allowed. Score Interpretation (from [address])    Independent   60-79 Minimally independent   40-59 Partially dependent   20-39 Very dependent   <20 Totally dependent     -Jose Chang., Barthel, DScottW. (1965). Functional evaluation: the Barthel Index. [facility] ([address] [place] [address] (1997). The Barthel activities of daily living index: self-reporting versus actual performance in the old (> or = 75 years). Journal of [address] [place] [address] 457), [facility], .ScottScottF, Kael Gayle., Keri Boston (1999). Measuring the change in disability after inpatient rehabilitation; comparison of the responsiveness of the Barthel Index and Functional [place] Measure. Journal of Neurology, Neurosurgery, and Psychiatry, 66(4), 400-675. Riki Bermeo, N.J.A, OPAL Wagner, & Chloe Beckwith M.A. (2004) Assessment of post-stroke quality of life in cost-effectiveness studies: The usefulness of the Barthel Index and the EuroQoL-5D.  Quality of Life Research, 15, 277-44         Occupational Therapy Evaluation Charge Determination   History Examination Decision-Making   LOW Complexity : Brief history review  LOW Complexity : 1-3 performance deficits relating to physical, cognitive , or psychosocial skils that result in activity limitations and / or participation restrictions  LOW Complexity : No comorbidities that affect functional and no verbal or physical assistance needed to complete eval tasks       Based on the above components, the patient evaluation is determined to be of the following complexity level: LOW   Pain:  Pre treatment: 2 /10   Post treatment:  2/10   Location: hip    Description:pain   Aggravating factors: Activity Tolerance:   good  Please refer to the flowsheet for vital signs taken during this treatment. After treatment patient left:   Up in chair  RN notified  Family at bedside   COMMUNICATION/EDUCATION:   The patients evaluation was discussed with: Physical Therapist and Registered Nurse.     Thank you for this referral.  Klever Crowe OT  Time Calculation: 53 mins

## 2022-08-23 NOTE — DISCHARGE SUMMARY
Ortho Discharge Summary    Patient ID:  Caron Galarza  [id number]  female  69 y.o.  1955    Admit date: 8/23/2022    Discharge date: 8/23/2022    Admitting Physician: Carolina Vasques DO     Consulting Physician(s):   Treatment Team: Attending Provider: Amanda Ho DO; Physical Therapist: Lela Vick PT; Care Manager: Taurus Chambers    Date of Surgery:   8/23/2022     Preoperative Diagnosis:  Unilateral primary osteoarthritis, right hip [M16.11]    Postoperative Diagnosis:   Unilateral primary osteoarthritis, right hip [M16.11]    Procedure(s):   RIGHT TOTAL HIP ARTHROPLASTY, DIRECT ANTERIOR     Anesthesia Type:   Spinal     Surgeon: Amanda Ho DO                            HPI:  Pt is a 69 y.o. female who has a history of Unilateral primary osteoarthritis, right hip [M16.11]  with pain and limitations of activities of daily living who presents at this time for a RIGHT TOTAL HIP ARTHROPLASTY, DIRECT ANTERIOR following the failure of conservative management. PMH:   Past Medical History:   Diagnosis Date    Anxiety state, unspecified     Asthma     as a child till age 13    Depression     Diverticulosis     DJD (Degenerative Joint Disease) of Knee     GERD (gastroesophageal reflux disease)     Hypercholesterolemia     Hypertension     Palmoplantar keratoderma     Prepyloric ulcer 2008       Body mass index is 30.54 kg/m². : A BMI > 30 is classified as obesity and > 40 is classified as morbid obesity. Medications upon admission :   Prior to Admission Medications   Prescriptions Last Dose Informant Patient Reported? Taking? MV-MN/FOLIC ACID/CALCIUM/VIT K (ONE-A-DAY WOMEN'S 50 PLUS PO) 8/16/2022 Self Yes Yes   Sig: Take 1 Tab by mouth daily. acetaminophen (TYLENOL) 500 mg tablet 8/22/2022  Yes Yes   Sig: Take  by mouth every six (6) hours as needed for Pain.    biotin 5,000 mcg TbDi 8/16/2022  Yes Yes   Sig: Take 1 Tablet by mouth.   busPIRone (BUSPAR) 10 mg tablet 8/16/2022  Yes Yes Sig: Take  by mouth three (3) times daily as needed. cholecalciferol (VITAMIN D3) (5000 Units/125 mcg) tab tablet 8/16/2022  Yes Yes   Sig: Take 5,000 Units by mouth in the morning. ferrous fumarate/vit Bcomp,C (SUPER B COMPLEX PO) 8/16/2022  Yes Yes   Sig: Take 1 Tablet by mouth.   lisinopril-hydroCHLOROthiazide (PRINZIDE, ZESTORETIC) 20-25 mg per tablet 8/23/2022 at 0430  No Yes   Sig: Take 1 tablet by mouth once daily   Patient taking differently: Take 1 Tablet by mouth daily. traMADoL (ULTRAM) 50 mg tablet 8/16/2022  Yes Yes   Sig: Take 50 mg by mouth every six (6) hours as needed for Pain. Facility-Administered Medications: None        Allergies:  No Known Allergies     Hospital Course: The patient underwent surgery. Complications:  None; patient tolerated the procedure well. Was taken to the PACU in stable condition and then transferred to the ortho floor. Perioperative Antibiotics:  Ancef     Postoperative Pain Management:  Oxycodone & Tylenol     DVT Prophylaxis: Aspirin 325BID    Postoperative transfusions:    Number of units banked PRBCs =   none     Post Op complications: none    Hemoglobin at discharge:    Lab Results   Component Value Date/Time    HGB 13.1 08/16/2022 08:17 AM    INR 1.0 08/16/2022 08:17 AM       Dressing remained clean, dry and intact. No significant erythema or swelling. Wound appears to be healing without any evidence of infection. Neurovascular exam found to be within normal limits. Physical Therapy started following surgery and participated in bed mobility, transfers and ambulation.         Gait:  Gait  Base of Support: Widened  Speed/Anne Marie: Fluctuations, Shuffled  Step Length: Right shortened, Left shortened  Stance: Right decreased  Gait Abnormalities: Antalgic, Decreased step clearance  Ambulation - Level of Assistance: Stand-by assistance  Distance (ft): 200 Feet (ft) (200ft x2 (room <> gym))  Assistive Device: Gait belt, Walker, rolling  Rail Use: Left (L rail with opposite HHA)  Stairs - Level of Assistance: Contact guard assistance  Number of Stairs Trained: 4                   Discharged to: Home with [facility]. Condition on Discharge:   stable    Discharge instructions:  - Anticoagulate with Aspirin 325 BID  - Take pain medications as prescribed  - Resume pre hospital diet      - Discharge activity: activity as tolerated  - Ambulate with assistive device as needed. - Weight bearing status - WBAT  - Wound Care Keep wound clean and dry. See discharge instruction sheet. -DISCHARGE MEDICATION LIST     Current Discharge Medication List        START taking these medications    Details   aspirin delayed-release 325 mg tablet Take 1 Tablet by mouth two (2) times a day for 30 days. Qty: 60 Tablet, Refills: 0  Start date: 8/23/2022, End date: 9/22/2022      famotidine (PEPCID) 20 mg tablet Take 1 Tablet by mouth two (2) times a day for 30 days. Qty: 60 Tablet, Refills: 0  Start date: 8/23/2022, End date: 9/22/2022      oxyCODONE IR (ROXICODONE) 5 mg immediate release tablet Take 1-2 Tablets by mouth every four (4) hours as needed for Pain for up to 7 days. Max Daily Amount: 6 Tablets. Qty: 40 Tablet, Refills: 0  Start date: 8/23/2022, End date: 8/30/2022    Associated Diagnoses: Status post total replacement of right hip      polyethylene glycol (MIRALAX) 17 gram packet Take 1 Packet by mouth daily for 7 days. Qty: 7 Packet, Refills: 0  Start date: 8/24/2022, End date: 8/31/2022      senna-docusate (PERICOLACE) 8.6-50 mg per tablet Take 1 Tablet by mouth two (2) times a day for 7 days. Qty: 14 Tablet, Refills: 0  Start date: 8/23/2022, End date: 8/30/2022           CONTINUE these medications which have NOT CHANGED    Details   busPIRone (BUSPAR) 10 mg tablet Take  by mouth three (3) times daily as needed. biotin 5,000 mcg TbDi Take 1 Tablet by mouth.       cholecalciferol (VITAMIN D3) (5000 Units/125 mcg) tab tablet Take 5,000 Units by mouth in the morning. ferrous fumarate/vit Bcomp,C (SUPER B COMPLEX PO) Take 1 Tablet by mouth. acetaminophen (TYLENOL) 500 mg tablet Take  by mouth every six (6) hours as needed for Pain. lisinopril-hydroCHLOROthiazide (PRINZIDE, ZESTORETIC) 20-25 mg per tablet Take 1 tablet by mouth once daily  Qty: 90 Tablet, Refills: 0    Associated Diagnoses: Essential hypertension      MV-MN/FOLIC ACID/CALCIUM/VIT K (ONE-A-DAY WOMEN'S 50 PLUS PO) Take 1 Tab by mouth daily.            STOP taking these medications       traMADoL (ULTRAM) 50 mg tablet Comments:   Reason for Stopping:            per medical continuation form      -Follow up in office in 2 weeks      Signed:  Johnathan Cerda NP  Orthopaedic Nurse Practitioner    8/23/2022  4:03 PM

## 2022-08-23 NOTE — PROGRESS NOTES
Transition of Care Plan:  RUR: n/a outpatient status   Disposition: home with home health  Follow up appointments: ortho  DME needed: pt owns dme needed for d/c  Transportation at Discharge: - at bedside   Keys or means to access home: yes  IM Medicare Letter: n/a outpatient status   Caregiver Contact: Nadia Gonzalez [phone]  Discharge Caregiver contacted prior to discharge? yes  Care Conference needed?:  no                 Reason for Admission:  right total hip arthroplasty                    RUR Score:  n/a outpatient status                    Plan for utilizing home health:  per recommendation         PCP: First and Last name:  Pastor Arslan MD   Name of Practice: [facility] [place]   Are you a current patient: Yes/No: yes   Approximate date of last visit: 1 wk ago    Can you participate in a virtual visit with your PCP: yes                    Current Advanced Directive/Advance Care Plan: patient declined conversation                  Transition of Care Plan:                      CM made room visit with patient who was alert and oriented. Pt confirmed demographics, insurance, and emergency contact on file. Pt and  live in a single level home with 4 ja. Pt has a RW. At baseline pt is independent with ADLs and driving. No hx of HH, SNF, or IPR. Pt's plan is to d/c home with home health. [address] signed for [facility] and placed on bedside chart. Referral sent to [facility] and waiting for response. Care Management Interventions  PCP Verified by CM: Yes  Mode of Transport at Discharge:  Other (see comment) ( )  Transition of Care Consult (CM Consult): Discharge Planning  Discharge Durable Medical Equipment: No  Physical Therapy Consult: Yes  Occupational Therapy Consult: Yes  Speech Therapy Consult: No  Support Systems: Spouse/Significant Other  Confirm Follow Up Transport: Family  The Plan for Transition of Care is Related to the Following Treatment Goals : [facility]  The Patient and/or Patient Representative was Provided with a Choice of Provider and Agrees with the Discharge Plan?: Yes  Freedom of Choice List was Provided with Basic Dialogue that Supports the Patient's Individualized Plan of Care/Goals, Treatment Preferences and Shares the Quality Data Associated with the Providers?: Yes  Discharge Location  Patient Expects to be Discharged to[de-identified] Home with home health    Neymar Lewis, [address]

## 2022-08-23 NOTE — PERIOP NOTES
Handoff Report from Operating Room to PACU    Report received from Margurite Hodgkins, RN and Crystal Johnson MD regarding Ethan Colón. Surgeon(s):  Hever Morrow DO  And Procedure(s) (LRB):  RIGHT TOTAL HIP ARTHROPLASTY, DIRECT ANTERIOR (Right)  confirmed   with dressings discussed. Anesthesia type, drugs, patient history, complications, estimated blood loss, vital signs, intake and output, and last pain medication, lines, and temperature were reviewed.

## 2022-08-23 NOTE — H&P
2022    Chief Complaint: Right hip pain    HPI: This is a 69 y.o. patient who complains of right hip pain which is activity dependent. The patient has failed nonoperative management of this end stage arthritis of the right hip and would like to proceed with a total hip arthroplasty. Patient has been medically and specialty cleared. The patient denies any numbness, weakness, tingling, fevers, chills, nausea, vomiting, fevers, chills, nausea, vomiting. Past Medical History:   Diagnosis Date    Anxiety state, unspecified     Asthma     as a child till age 13    Depression     Diverticulosis     DJD (Degenerative Joint Disease) of Knee     GERD (gastroesophageal reflux disease)     Hypercholesterolemia     Hypertension     Palmoplantar keratoderma     Prepyloric ulcer        Past Surgical History:   Procedure Laterality Date    ENDOSCOPY, COLON, DIAGNOSTIC      due     HX COLONOSCOPY         No current facility-administered medications on file prior to encounter. Current Outpatient Medications on File Prior to Encounter   Medication Sig Dispense Refill    lisinopril-hydroCHLOROthiazide (PRINZIDE, ZESTORETIC) 20-25 mg per tablet Take 1 tablet by mouth once daily (Patient taking differently: Take 1 Tablet by mouth in the morning.) 90 Tablet 0    MV-MN/FOLIC ACID/CALCIUM/VIT K (ONE-A-DAY WOMEN'S 50 PLUS PO) Take 1 Tab by mouth daily.          No Known Allergies    Family History   Problem Relation Age of Onset    Dementia Mother     Cancer Father         cancer    Hypertension Father     Heart Disease Father     Other Brother 62        [facility]    Cancer Sister 7        Brain Cancer       Social History     Socioeconomic History    Marital status: LIFE PARTNER   Tobacco Use    Smoking status: Former     Packs/day: 0.50     Years: 5.00     Pack years: 2.50     Types: Cigarettes     Quit date: 2000     Years since quittin.5    Smokeless tobacco: Never   Vaping Use    Vaping Use: Never used   Substance and Sexual Activity    Alcohol use: No    Drug use: No         Review of Systems:   Unless noted in the HPI, all 12 systems have been reviewed and are negative for pertinent positives. Physical Examination:      AOX3  No apparent distress  Lungs: Clear to auscultation bilaterally  Heart: regular rate and rhythm  Abdomen: soft, no guarding  Head: NC/AT  Sensation intact to light touch: L1-S1 dermatomes    Extremities      Left upper extremity: full active and passive range of motion without pain, deformity, open wound, strength 5/5 in all major muscle groups  Right upper extremity:full active and passive range of motion without pain, deformity, open wound, strength 5/5 in all major muscle groups  Left lower extremity:full active and passive range of motion without pain, deformity, open wound, strength 5/5 in all major muscle groups  Right lower extremity: No deformity is noted. Circumduction of the hip causes significant pain in the groin, reproductive of the chief complaint. Range of motion is limited, with a positive impingement sign. Gait is antalgic. Sensation is intact to light touch in the L1-S1 dermatomes. Skin is intact about the hip. Hip flexion and abduction strength is 4+/5, hip extension and knee extension as well as tibialis anterior and EHL/GCS are 5/5 strength.     Pertinent Xrays:  Pelvis and hip xrays indicate endstage arthrosis of the right hip      Assessment: Right hip OA    Plan:  Admit to my service  Plan for right total hip arthroplasty  NPO   Preoperative assessments complete      We did discuss the risks of surgery which include but are not limited to infection, nerve or blood vessel damage, failure of fixation, failure of any possible implant, need for reoperation, postoperative pain and swelling, intra-or postoperative fracture, postoperative dislocation, leg length inequality, need for reoperation, implant failure, death, disability, organ dysfunction, wound healing issues, DVT, PE, and the need for further procedures. The patient did freely state their understanding and satisfaction with our discussion. We will proceed after medical clearances. The patient was counseled at length about the risks of mike Covid-19 during their perioperative period and any recovery window from their procedure. The patient was made aware that mike Covid-19  may worsen their prognosis for recovering from their procedure and lend to a higher morbidity and/or mortality risk. All material risks, benefits, and reasonable alternatives including postponing the procedure were discussed. The patient does  wish to proceed with the procedure at this time.

## 2022-08-23 NOTE — OP NOTES
Date of Procedure: 8/23/2022  PREOPERATIVE DIAGNOSIS: Right hip osteoarthritis. POSTOPERATIVE DIAGNOSIS: same  OPERATION: Right total hip arthroplasty. ASSISTANT SURGEON: none  ANESTHESIA: spinal.  ESTIMATED BLOOD LOSS: 50 mL. COMPLICATIONS: None. SPECIMEN: None. DRAINS: None. IMPLANTS:     Boulevard Trident hemispherical acetabular shell 52 mm outer diameter   Boulevard trident X3 0 degree polyethylene insert 36 mm inner diameter   Biolox Delta ceramic v40 femoral head 36 mm outer diameter  -5mm Neck length   Accolade 2, 127 degree neck angle, hip stem size 3 with a V40 taper. OPERATIVE INDICATIONS: This is a(an) 69 y.o. female who failed  nonoperative management of right hip osteoarthritis. We did discuss the risks  of surgery which include but are not limited to infection, nerve or blood  vessel damage, need for reoperation, disability, DVT, PE, postoperative pain,  swelling, stiffness, intra or postoperative fracture, leg length inequality    and postoperative dislocation, femoral and sciatic nerve palsies, lateral femoral  cutaneous nerve injury, wound healing complications, all other organ  disfunction. The patient did freely consent for surgery. DESCRIPTION OF PROCEDURE IN DETAIL: After the correct side and site were  identified by myself in the holding area, the patient was transported to the  surgical suite where after successful induction of spinal anesthesia, the  patient was transferred to the [facility] table where all bony prominences were  padded. The right hip was then prepped and draped in usual sterile orthopedic  fashion. After an appropriate time out and confirmation the 2 grams of Ancef  had been infused prior to any incision, an incision was made beginning 2 cm  lateral to and distal to the ASIS directly laterally and distally by about 8  cm. The tissues were dissected sharply down to fascia and strict hemostasis  was maintained with the use of electrocautery.  The deep fascia was incised  sharply and the tensor of the fascia karina was bluntly elevated and mobilized. A superolateral retractor was then placed. The deep fascia was divided and the ascending branch of the lateral femoral circumflex artery was identified, coagulated and divided. An inferior medial retractor was then placed giving good visualization of the anterior  capsule. The iliocapsularis was then elevated off of the anterior capsule  and an anterior column retractor was placed. A capsulectomy was performed  anteriorly. Once the capsulectomy was performed,  retractors were placed intra-articularly and an osteotomy was performed in  the femoral neck in line with the preoperative template. Once this was  performed, the femoral head was removed with use of a powered corkscrew. The retractors were placed inside of capsule, outside of labrum and the  circumferential labrectomy was performed. The Pulvinar was excised with use  of electrocautery. The medial tissues were infused with 0.5% Ropivacaine. Once this was performed, a small reamer was then used to medialize  the acetabulum to its true floor. With that, the reamer was increased in  size and then placed in the direction of the anatomic direction of the  acetabulum. Further reaming was performed and care was taken to maintain good medial acetabular wall integrity. I reamed until the instrument had excellent  as well as a bed of bleeding cancellous bone. I, therefore, irrigated the acetabular recess and dried it with a lap and  impacted into place a final shell into place at   43 degrees of abduction as well as 15 degrees of anteversion under direct fluoroscopic visualization. I checked stability of the shell. It was excellent. With that, the  stability was tested and it was found to have an excellent scratch fit. I  therefore irrigated the wound and impacted into a place a 0 degree  polyethylene insert. This did have excellent engagement of the locking  mechanism. This was tested as well. The retractors were then removed and the leg was placed in extended, adducted, externally rotated position and retractors were placed about the proximal femur. The limited capsulotomy was performed at the greater trochanter and this did allow the proximal femur to  elevate up and out of the wound. I then used the box osteotome to clear off  metaphysical bone and the lateral cortical bone was then removed with the use of a rongeur. The canal entry broach was then used. Sequential broaching was then performed until I had good axial and rotational control. I used the calcar planer to clear off irregularities in the calcar. I trial reduced the hip with a 0 degree ball. X-rays were taken and  demonstrated excellent positioning of the femoral component. I redislocated the joint. I removed  the head and neck as well as the broach and impacted into place a  Final permanent Accolade 2 127 degree neck angle hip stem. This did have excellent axial and  rotational control. The calcar was checked and there were no cracks or other  bone deficiencies. I, therefore, trialed a ball which did have excellent  reconstitution of the length and offset. I, therefore, redislocated, cleaned  and dried the trunnion and impacted into place our final ball with seven sharp blows of the mallet. This did have excellent fixation. I therefore relocated the joint. Final  x-rays were taken demonstrating good positioning of all components as well as  stability and no fractures were noted. Stability testing was performed with the leg at neutral, then externally rotated 90 degrees, then externally rotated at 45 degrees combined with leg extension. The hip was stable through this range of motion. The wound was then copiously  irrigated with normal saline mixed with Betadine. Soft tissues were then  infused with 0.5% Ropivacaine. The fascia was then closed with a running #1  Vicryl suture. Deep stitches were placed.  Then, 2-0 Vicryl, 3-0 Monocryl,  Dermabond, and Steri-Strips were applied. A sterile dressing was applied. The patient was then taken off of the table and taken to PACU in stable  condition with no obvious intraoperative complications. POSTOPERATIVE PLAN: The patient will be weightbearing as tolerated. They will  have ecotrin for DVT prophylaxis for  1 month. The patient will follow up in our office in 2 weeks and then 6 weeks for repeat clinical and  radiographic checks per our normal protocol.

## 2022-08-23 NOTE — ANESTHESIA PROCEDURE NOTES
Spinal Block    Start time: 8/23/2022 7:15 AM  End time: 8/23/2022 7:17 AM  Performed by: Yang Ruiz MD  Authorized by: Yang Ruiz MD     Pre-procedure:   Indications: at surgeon's request and primary anesthetic  Preanesthetic Checklist: patient identified, risks and benefits discussed, anesthesia consent, site marked, patient being monitored, timeout performed and fire risk safety assessment completed and verbalized    Timeout Time: 07:15 EDT      Spinal Block:   Patient Position:  Seated  Prep Region:  Lumbar  Prep: chlorhexidine      Location:  L3-4  Technique:  Single shot  Local: bupivacaine (PF) (MARCAINE) 0.5 % (5 mg/mL) intrathecal - Intrathecal   10 mg - 8/23/2022 7:17:00 AM    Med Admin Time: 8/23/2022 7:17 AM    Needle:   Needle Type:  Pencan  Needle Gauge:  25 G  Attempts:  1      Events: CSF confirmed and no blood with aspiration        Assessment:  Insertion:  Uncomplicated  Patient tolerance:  Patient tolerated the procedure well with no immediate complications

## 2022-08-23 NOTE — PERIOP NOTES
TRANSFER - OUT REPORT:    Verbal report given to [address] [facility] Kwesi Doyle RN(name) on [facility]  being transferred to [facility](unit) for routine post - op       Report consisted of patients Situation, Background, Assessment and   Recommendations(SBAR). Information from the following report(s) OR Summary, Procedure Summary, Intake/Output, and MAR was reviewed with the receiving nurse. Opportunity for questions and clarification was provided.       Patient transported with:   [employer]

## 2022-08-23 NOTE — PROGRESS NOTES
PHYSICAL THERAPY EVALUATION/DISCHARGE  Patient: Sunny Albrecht (19 y.o. female)  Date: 8/23/2022  Primary Diagnosis: Unilateral primary osteoarthritis, right hip [M16.11]  Procedure(s) (LRB):  RIGHT TOTAL HIP ARTHROPLASTY, DIRECT ANTERIOR (Right) Day of Surgery   Precautions: Fall, WBAT         ASSESSMENT  Based on the objective data described below, the patient presents POD 0 s/p L CARRIE. Pt received for PT evaluation seated on BSC with OT present assisting with toileting. Pt agreeable to PT session, demonstrating sit <> stands with RW and SBA-CGA for safety. Pt walked >200ft x2 bouts with RW and CGA-SBA. Gait slow, short steps B, no buckling or LOB noted. Pt educated on and performed stair negotiation x4 steps with L HR and hand held other side for safety, CGA overall for balance. Pt performed car transfer with CGA-SBA and RW, cues for LE management. Pt returned to room end of session and left seated in recliner chair with OT present to complete dressing skills. Pt has cleared acute care PT at this time and recommend DC home with family assist. Will sign off. Functional Outcome Measure: The patient scored 85/100 on the Barthel Index outcome measure which is indicative of 15%. Other factors to consider for discharge: lives with  in 1 story home with PARISH, L HR only. Has hurrycane and RW     Further skilled acute physical therapy is not indicated at this time. PLAN :  Recommendation for discharge: (in order for the patient to meet his/her long term goals)  No skilled physical therapy/ follow up rehabilitation needs identified at this time. This discharge recommendation:  Has been made in collaboration with the attending provider and/or case management    IF patient discharges home will need the following DME: patient owns DME required for discharge       SUBJECTIVE:   Patient stated I didn't bring my walker here, is that ok? ? I Have it at [facility].     OBJECTIVE DATA SUMMARY:   HISTORY:    Past Medical History:   Diagnosis Date    Anxiety state, unspecified     Asthma     as a child till age 13    Depression     Diverticulosis     DJD (Degenerative Joint Disease) of Knee     GERD (gastroesophageal reflux disease)     Hypercholesterolemia     Hypertension     Palmoplantar keratoderma     Prepyloric ulcer 2008     Past Surgical History:   Procedure Laterality Date    ENDOSCOPY, COLON, DIAGNOSTIC      due 2015    HX COLONOSCOPY         Prior level of function: mod I with hurrycane short distances. Personal factors and/or comorbidities impacting plan of care: highly motivated    Home Situation  Support Systems: Spouse/Significant Other  Patient Expects to be Discharged to[de-identified] Home with home health    EXAMINATION/PRESENTATION/DECISION MAKING:   Critical Behavior:  Neurologic State: Alert  Orientation Level: Oriented X4  Cognition: Follows commands, Appropriate decision making       Skin:  appears intact  Edema: none noted  Range Of Motion:  AROM: Generally decreased, functional                       Strength:    Strength: Generally decreased, functional                    Tone & Sensation:   Tone: Normal              Sensation: Intact               Coordination:  Coordination: Within functional limits    Functional Mobility:  Bed Mobility:           Scooting: Supervision  Transfers:  Sit to Stand: Stand-by assistance  Stand to Sit: Supervision;Stand-by assistance        Bed to Chair: Stand-by assistance              Balance:   Sitting: Intact; Without support  Standing: Intact; With support  Standing - Static: Good;Constant support  Standing - Dynamic : Good;Fair;Constant support  Ambulation/Gait Training:  Distance (ft): 200 Feet (ft) (200ft x2 (room <> gym))  Assistive Device: Gait belt;Walker, rolling  Ambulation - Level of Assistance: Stand-by assistance        Gait Abnormalities: Antalgic;Decreased step clearance        Base of Support: Widened  Stance: Right decreased  Speed/Anne Marie: Fluctuations; Shuffled  Step Length: Right shortened;Left shortened              Stairs:  Number of Stairs Trained: 4  Stairs - Level of Assistance: Contact guard assistance   Rail Use: Left  (L rail with opposite HHA)      Functional Measure:  Barthel Index:    Bathin  Bladder: 10  Bowels: 10  Groomin  Dressing: 10 (use of AE)  Feeding: 10  Mobility: 15  Stairs: 5  Toilet Use: 5  Transfer (Bed to Chair and Back): 15  Total: 85/100       The Barthel ADL Index: Guidelines  1. The index should be used as a record of what a patient does, not as a record of what a patient could do. 2. The main aim is to establish degree of independence from any help, physical or verbal, however minor and for whatever reason. 3. The need for supervision renders the patient not independent. 4. A patient's performance should be established using the best available evidence. Asking the patient, friends/relatives and nurses are the usual sources, but direct observation and common sense are also important. However direct testing is not needed. 5. Usually the patient's performance over the preceding 24-48 hours is important, but occasionally longer periods will be relevant. 6. Middle categories imply that the patient supplies over 50 per cent of the effort. 7. Use of aids to be independent is allowed. Score Interpretation (from [address] [address])    Independent   60-79 Minimally independent   40-59 Partially dependent   20-39 Very dependent   <20 Totally dependent     -Jose Chang., Barthel, D.W. (1965). Functional evaluation: the Barthel Index. [address] [facility] ([address] (). The Barthel activities of daily living index: self-reporting versus actual performance in the old (> or = 75 years). Journal of [address] [place] [address] 45(7), [facility], .DUARTE, Robb Edwards., Xiang Andres (1999).  Measuring the change in disability after inpatient rehabilitation; comparison of the responsiveness of the Barthel Index and Functional [place] Measure. Journal of Neurology, Neurosurgery, and Psychiatry, 66(4), 493-784. Eddye CARMEN Ribeiro.SERENA, OPAL Wagner, & Nito Perez M.A. (2004) Assessment of post-stroke quality of life in cost-effectiveness studies: The usefulness of the Barthel Index and the EuroQoL-5D. Quality of Life Research, 15, 101-55           Physical Therapy Evaluation Charge Determination   History Examination Presentation Decision-Making   LOW Complexity : Zero comorbidities / personal factors that will impact the outcome / POC LOW Complexity : 1-2 Standardized tests and measures addressing body structure, function, activity limitation and / or participation in recreation  LOW Complexity : Stable, uncomplicated  LOW Complexity : FOTO score of       Based on the above components, the patient evaluation is determined to be of the following complexity level: LOW     Pain Rating:  Did not report/quantify pain    Activity Tolerance:   Good and requires rest breaks      After treatment patient left in no apparent distress:   Sitting in chair, Call bell within reach, and OT present completing dressing skills    COMMUNICATION/EDUCATION:   The patients plan of care was discussed with: Occupational therapist, Registered nurse, and Case management. Fall prevention education was provided and the patient/caregiver indicated understanding.     Thank you for this referral.  Cleve Ruiz, PT, DPT, NCS   Time Calculation: 35 mins

## 2022-08-23 NOTE — PROGRESS NOTES
Ortho / Neurosurgery NP Note    POD# 0  s/p RIGHT TOTAL HIP ARTHROPLASTY, DIRECT ANTERIOR   Pt seen with caregiver at bedside. Pt resting in chair. Awake and alert, feeling well. Recently seen by therapy and cleared for discharge. Reports minimal post-op pain, relieved by oxycodone   Tolerating regular diet. No nausea. No other complaints. Very pleased with post-op progress thus far. VSS Afebrile. Room air. Visit Vitals  BP (!) 150/72   Pulse 81   Temp 97.8 °F (36.6 °C)   Resp 20   Wt 80.7 kg (177 lb 14.6 oz)   SpO2 100%   BMI 30.54 kg/m²       Voiding status: voiding   Output (mL)  Urine Voided: 300 ml (08/23/22 1552)  Last Bowel Movement Date: 08/22/22 (08/23/22 1258)      Labs    Lab Results   Component Value Date/Time    HGB 13.1 08/16/2022 08:17 AM      Lab Results   Component Value Date/Time    INR 1.0 08/16/2022 08:17 AM      Lab Results   Component Value Date/Time    Sodium 137 08/16/2022 08:17 AM    Potassium 3.4 (L) 08/16/2022 08:17 AM    Chloride 101 08/16/2022 08:17 AM    CO2 30 08/16/2022 08:17 AM    Glucose 122 (H) 08/16/2022 08:17 AM    BUN 19 08/16/2022 08:17 AM    Creatinine 1.13 (H) 08/16/2022 08:17 AM    Calcium 9.7 08/16/2022 08:17 AM     Recent Glucose Results: No results found for: GLU, GLUPOC, GLUCPOC        Body mass index is 30.54 kg/m². : A BMI > 30 is classified as obesity and > 40 is classified as morbid obesity. Dressing c.d.i  Cryotherapy in place over incision  Calves soft and supple; No pain with passive stretch  Sensation and motor intact.  +PF/DF/EHL intact   SCDs for mechanical DVT proph while in bed     PLAN:  1) PT BID, OT - WBAT  2) Aspirin 325 mg PO BID for DVT Prophylaxis   3) GI Prophylaxis - Pepcid  4) Readniess for discharge:     [x] Vital Signs stable    [x] + Voiding    [x] Wound intact, drainage minimal    [x] Tolerating PO intake     [x] Cleared by PT (OT if applicable)     [x] Stair training completed (if applicable)    [x] Independent / Contact Guard Assist (household distance)     [x] Bed mobility     [x] Car transfers     [x] ADLs    [x] Adequate pain control on oral medication alone     [facility]'s [facility].      Al Turcios NP

## 2022-08-23 NOTE — PROGRESS NOTES
TRANSFER - IN REPORT:    Verbal report received from Brian Berg RN(name) on Miguel Amend  being received from [employer]) for routine post - op      Report consisted of patients Situation, Background, Assessment and   Recommendations(SBAR). Information from the following report(s) SBAR, Kardex, Procedure Summary, Intake/Output, and MAR was reviewed with the receiving nurse. Opportunity for questions and clarification was provided. Assessment completed upon patients arrival to unit and care assumed.

## 2022-08-23 NOTE — DISCHARGE INSTRUCTIONS
Discharge Instructions:  Mag Zhang    Surgery: RIGHT TOTAL HIP ARTHROPLASTY, DIRECT ANTERIOR  Surgeon:   Dr. Sánchez [place]  Surgery Date:  8/23/2022    To relieve pain:  Use ice/gel packs.    -Put the ice pack directly over the wound, or anywhere you are hurting or swollen.   -To control pain and swelling, keep ice on regularly, especially after physical activity.  -The packs should stay cold for 3-4 hours. When it is not cold anymore, rotate with the packs in the freezer. Elevate your leg. This will also keep swelling down. Rest for at least 20 minutes between activity or exercises. To keep track of your pain medications, write down what you take and when you take it. The last dose of pain medication you got in the hospital was:     Medication    Dose    Date & Time      Choose your medications based on the pain scale below: To keep your pain under control, take Tylenol every 6 hours for 14 days - even if you feel like you dont need it. For mild to moderate pain (4-6 on pain scale), take one pain pill every 4 hours or as instructed. For severe pain (7-10 on pain scale), take two pain pills every 4 hours or as instructed. To prevent nausea, take your pain medications with food. Pain Scale              As your pain lessens:    Slowly start taking less pain medication. You may do this by waiting longer between doses or by taking smaller doses. Stop using the pain medications as soon as you no longer need it, usually in 2-3 weeks. Aspirin  To prevent blood clots, you will need to take Aspirin 325 mg twice a day for 30 days. To prevent stomach upset or bleeding:  Do not take non-steroidal anti-inflammatory medications (Ibuprofen, Advil, Motrin, Naproxen, etc.)   Take Pepcid 20 mg twice a day, or a similar home medication, while you are taking a blood thinner. Keep your waterproof dressing in place.  It will be removed by your surgeon during your follow-up appointment in 2 weeks. You may need to change the dressing if you are having drainage to where the dressing is no longer intact. You will be given an extra dressing to use at home. You will have some swelling, warmth, and bruising around the incision and up and down the leg after surgery. This will may get worse in the first few days you are home and will slowly get better over the next few weeks. You may shower with this dressing over your wound. After showering pat the dressing dry. DO NOT's:  Do not rub your surgical wound  Do not put lotion or oils on your wound. Do not take a tub bath or go swimming until your doctor says it is ok. To increase and promote healing:  Stop Smoking (or at least cut back on smoking). Eat a well-balanced diet. High in protein and Vitamin C. If you have a poor appetite, supplement your diet by drinking Ensure, Glucerna or Bowler Instant Breakfast for the next 30 days     If you are a diabetic, control you blood sugars to prevent infection and help your wound heal.                     Prevent Infection:    Wash your hands                       -This is the most important thing you or your caregiver can do. -Wash your hands with soap and water (or use the hand ) before you touch any wounds. Shower  -Use the antibacterial soap we gave you when you take a shower.   -Shower with this soap until your wounds are healed. Use Clean Sheets   -Put freshly cleaned sheets on your bed after surgery.   -To keep the surgery site clean, do not allow pets to sleep with you while your wound is still healing. To prevent constipation, stay active and drink plenty of fluid. While using pain medications, you should also take stool softeners and laxatives, such as Pericolace and Miralax.        If you are having too many bowel movements, then you may need to stop taking the laxatives. You should have a bowel movement 3-4 days after surgery and then at least every other day while on pain medication. To improve your recovery, you must be active! Use your walker and take short walks (in your home) about every 2 hours during the day. Try to increase how far you walk each day. You can put as much weight on your leg as you can tolerate while walking. Home health physical therapy will come to your home the day after you leave the hospital. The therapist will visit about 4 times over the next couple of weeks to teach you exercises, how to get out of bed and how to safely walk in your home. NO DRIVING until your surgeon tells you it is ok. You can return to work when cleared by a physician. Please call your physician immediately if you have:  Constant bleeding from your wound. Increasing redness or swelling around your wound (some warmth, bruising and swelling is normal). Change in wound drainage (increase in amount, color, or bad smell). Change in mental status (unusual behavior). Temperature over 101.5 degrees Fahrenheit   Pain or redness in the calf (back of your lower leg)  Increased swelling of the thigh, ankle, calf, or foot. Emergency! CALL 911 if you have:  Shortness of breath  Chest pain when you cough or taking a deep breath        Please call your surgeons office at [phone] for a follow up appointment in 2 weeks. You should call as soon as you get home or the next day after discharge. If you have questions or concerns during normal business hours, you may reach Dr. Brea Jones at [phone].         Instructions for 24 hours after receiving General Anesthesia or Intravenous Sedation,   and while taking prescription Narcotics    Common side effects associated with each of these medications includes:  - Drowsiness, dizziness, euphoria, sleepiness or confusion  - Impaired memory recall  - Unsteady gait, loss of fine muscle control and delayed reaction time  - Visual disturbances, difficulty focusing, blurred vision    You may experience some of these side effects or you may not be aware of subtle changes in your behavior or reaction time. Because you received these medications, we are giving you the following instructions. Discharge Instructions:   - Someone should be with you for the next 24 hours  - For your own safety, a responsible adult must drive you home  - Do not consume alcoholic beverages   - Do not make important personal, legal or business decisions for 24 hours  - Move slowly and carefully, do not make sudden position changes. Be alert for dizziness or lightheadedness and move accordingly. - Do not operate equipment for 24 hours - [employer], power tools, Kitchen accessories: stove, etc.  - If you have not urinated within 8 hours after discharge, please contact your surgeon's office.

## 2022-08-23 NOTE — ANESTHESIA POSTPROCEDURE EVALUATION
Procedure(s):  RIGHT TOTAL HIP ARTHROPLASTY, DIRECT ANTERIOR. spinal, general - backup    Anesthesia Post Evaluation      Multimodal analgesia: multimodal analgesia used between 6 hours prior to anesthesia start to PACU discharge  Patient location during evaluation: bedside  Patient participation: complete - patient participated  Level of consciousness: awake  Pain management: adequate  Airway patency: patent  Anesthetic complications: no  Cardiovascular status: acceptable  Respiratory status: acceptable  Hydration status: acceptable  Post anesthesia nausea and vomiting:  controlled  Final Post Anesthesia Temperature Assessment:  Normothermia (36.0-37.5 degrees C)      INITIAL Post-op Vital signs:   Vitals Value Taken Time   /52 08/23/22 1015   Temp 36.7 °C (98.1 °F) 08/23/22 0905   Pulse 57 08/23/22 1019   Resp 20 08/23/22 1019   SpO2 100 % 08/23/22 1019   Vitals shown include unvalidated device data.

## 2022-08-26 ENCOUNTER — TELEPHONE (OUTPATIENT)
Dept: ORTHOPEDIC SURGERY | Age: 67
End: 2022-08-26

## 2022-08-26 NOTE — TELEPHONE ENCOUNTER
Patient is requesting an outpatient physical therapy referral as she has not heard anything from home health. Patient would like the referral be sent to [facility] off of [facility].  The fax number is [phone]

## 2022-08-30 DIAGNOSIS — M16.11 UNILATERAL PRIMARY OSTEOARTHRITIS, RIGHT HIP: Primary | ICD-10-CM

## 2022-09-02 DIAGNOSIS — Z47.1 AFTERCARE FOLLOWING RIGHT HIP JOINT REPLACEMENT SURGERY: Primary | ICD-10-CM

## 2022-09-02 DIAGNOSIS — Z96.641 AFTERCARE FOLLOWING RIGHT HIP JOINT REPLACEMENT SURGERY: Primary | ICD-10-CM

## 2022-09-06 ENCOUNTER — OFFICE VISIT (OUTPATIENT)
Dept: ORTHOPEDIC SURGERY | Age: 67
End: 2022-09-06
Payer: MEDICARE

## 2022-09-06 ENCOUNTER — HOSPITAL ENCOUNTER (OUTPATIENT)
Dept: GENERAL RADIOLOGY | Age: 67
Discharge: HOME | End: 2022-09-06
Payer: MEDICARE

## 2022-09-06 VITALS
HEIGHT: 64 IN | OXYGEN SATURATION: 100 % | HEART RATE: 75 BPM | DIASTOLIC BLOOD PRESSURE: 60 MMHG | TEMPERATURE: 97.3 F | BODY MASS INDEX: 30.39 KG/M2 | SYSTOLIC BLOOD PRESSURE: 124 MMHG | WEIGHT: 178 LBS

## 2022-09-06 DIAGNOSIS — Z96.641 AFTERCARE FOLLOWING RIGHT HIP JOINT REPLACEMENT SURGERY: ICD-10-CM

## 2022-09-06 DIAGNOSIS — Z47.1 AFTERCARE FOLLOWING RIGHT HIP JOINT REPLACEMENT SURGERY: ICD-10-CM

## 2022-09-06 DIAGNOSIS — Z47.1 AFTERCARE FOLLOWING RIGHT HIP JOINT REPLACEMENT SURGERY: Primary | ICD-10-CM

## 2022-09-06 DIAGNOSIS — N18.30 STAGE 3 CHRONIC KIDNEY DISEASE, UNSPECIFIED WHETHER STAGE 3A OR 3B CKD (HCC): ICD-10-CM

## 2022-09-06 DIAGNOSIS — Z96.641 AFTERCARE FOLLOWING RIGHT HIP JOINT REPLACEMENT SURGERY: Primary | ICD-10-CM

## 2022-09-06 PROCEDURE — 73502 X-RAY EXAM HIP UNI 2-3 VIEWS: CPT

## 2022-09-06 PROCEDURE — 99024 POSTOP FOLLOW-UP VISIT: CPT | Performed by: ORTHOPAEDIC SURGERY

## 2022-09-06 NOTE — LETTER
9/6/2022    Patient: Kimberly Cuellar   YOB: 1955   Date of Visit: 9/6/2022     Alyson Pimentel MD  [address] [address]  Via Fax: [phone]    Dear Alyson Pimentel MD,      Thank you for referring Ms. Paulette Hawk to [facility] for evaluation. My notes for this consultation are attached. If you have questions, please do not hesitate to call me. I look forward to following your patient along with you.       Sincerely,    Shena Nolan, DO

## 2022-09-06 NOTE — PROGRESS NOTES
Jan Gautam  is here for a follow up visit from a total hip arthroplasty on the right side. The patient is doing well, with no medical complications since discharge. Pain has been appropriate since surgery,and the patient is progressing well with physical therapy. Current Outpatient Medications on File Prior to Visit   Medication Sig Dispense Refill    aspirin delayed-release 325 mg tablet Take 1 Tablet by mouth two (2) times a day for 30 days. 60 Tablet 0    famotidine (PEPCID) 20 mg tablet Take 1 Tablet by mouth two (2) times a day for 30 days. 60 Tablet 0    busPIRone (BUSPAR) 10 mg tablet Take  by mouth three (3) times daily as needed. biotin 5,000 mcg TbDi Take 1 Tablet by mouth. cholecalciferol (VITAMIN D3) (5000 Units/125 mcg) tab tablet Take 5,000 Units by mouth in the morning. ferrous fumarate/vit Bcomp,C (SUPER B COMPLEX PO) Take 1 Tablet by mouth. acetaminophen (TYLENOL) 500 mg tablet Take  by mouth every six (6) hours as needed for Pain. lisinopril-hydroCHLOROthiazide (PRINZIDE, ZESTORETIC) 20-25 mg per tablet Take 1 tablet by mouth once daily (Patient taking differently: Take 1 Tablet by mouth daily.) 90 Tablet 0    MV-MN/FOLIC ACID/CALCIUM/VIT K (ONE-A-DAY WOMEN'S 50 PLUS PO) Take 1 Tab by mouth daily. No current facility-administered medications on file prior to visit. ROS:  General: denies agitation, major chest pain, unexpected weakness  Hip pain is managed with no medication. Skin: healing wound is without issue. Strength: appropriate weakness of involved extremity is resolving since surgery      Physical Examination:    Blood pressure 124/60, pulse 75, temperature 97.3 °F (36.3 °C), temperature source Tympanic, height 5' 4\" (1.626 m), weight 178 lb (80.7 kg), SpO2 100 %. Skin: no significant drainage, no wound dehiscence. Sensation intact to light touch at level of wound and distally. Lateral femoral cutaneous nerve function is intat.   Strength is 4+/5 with hip flexion and abduction  Leg lengths are clinically equal  Distal swelling is noted, but appropriate for postoperative course  Distal capillary refill less than 2 seconds      Imaging:    Postoperative xrays are reviewed, they indicate a right total hip arthroplasty in excellent position without evidence of loosening or failure. Leg lengths are equal through the hip.       Assessment: Status post right total hip arthroplasty    Plan:  Patient will continue physical therapy, with the goal of outpatient therapy and then home exercise program as soon as is possible  Wound care and dental prophylaxis instructions were reviewed  Continue to weight bear as tolerated without restriction, except to keep to the positions of comfort  Deep Venous Thrombosis Prophylaxis: aspirin    Follow up will be 4 weeks,  right hip xrays on follow up

## 2022-09-06 NOTE — PROGRESS NOTES
Identified pt with two pt identifiers (name and ). Reviewed chart in preparation for visit and have obtained necessary documentation. Jenna Galindo is a 69 y.o. female  Chief Complaint   Patient presents with    Surgical Follow-up     RT Hip     Visit Vitals  /60 (BP 1 Location: Left upper arm, BP Patient Position: Sitting, BP Cuff Size: Large adult)   Pulse 75   Temp 97.3 °F (36.3 °C) (Tympanic)   Ht 5' 4\" (1.626 m)   Wt 178 lb (80.7 kg)   SpO2 100%   BMI 30.55 kg/m²     1. Have you been to the ER, urgent care clinic since your last visit? Hospitalized since your last visit? No    2. Have you seen or consulted any other health care providers outside of the [address] [place] [address] since your last visit? Include any pap smears or colon screening.  No

## 2022-09-09 ENCOUNTER — TELEPHONE (OUTPATIENT)
Dept: ORTHOPEDIC SURGERY | Age: 67
End: 2022-09-09

## 2022-09-12 ENCOUNTER — TELEPHONE (OUTPATIENT)
Dept: ORTHOPEDIC SURGERY | Age: 67
End: 2022-09-12

## 2022-09-12 NOTE — TELEPHONE ENCOUNTER
Patient called asking for her PT referral to be faxed over to the [facility] at [phone]. This has been completed.

## 2022-10-06 DIAGNOSIS — Z96.641 AFTERCARE FOLLOWING RIGHT HIP JOINT REPLACEMENT SURGERY: Primary | ICD-10-CM

## 2022-10-06 DIAGNOSIS — Z47.1 AFTERCARE FOLLOWING RIGHT HIP JOINT REPLACEMENT SURGERY: Primary | ICD-10-CM

## 2022-10-07 ENCOUNTER — OFFICE VISIT (OUTPATIENT)
Dept: ORTHOPEDIC SURGERY | Age: 67
End: 2022-10-07
Payer: MEDICARE

## 2022-10-07 ENCOUNTER — HOSPITAL ENCOUNTER (OUTPATIENT)
Dept: GENERAL RADIOLOGY | Age: 67
Discharge: HOME | End: 2022-10-07
Payer: MEDICARE

## 2022-10-07 VITALS
WEIGHT: 178 LBS | OXYGEN SATURATION: 98 % | SYSTOLIC BLOOD PRESSURE: 125 MMHG | DIASTOLIC BLOOD PRESSURE: 71 MMHG | BODY MASS INDEX: 30.39 KG/M2 | HEART RATE: 81 BPM | TEMPERATURE: 96.3 F | HEIGHT: 64 IN

## 2022-10-07 DIAGNOSIS — Z96.641 AFTERCARE FOLLOWING RIGHT HIP JOINT REPLACEMENT SURGERY: Primary | ICD-10-CM

## 2022-10-07 DIAGNOSIS — Z47.1 AFTERCARE FOLLOWING RIGHT HIP JOINT REPLACEMENT SURGERY: Primary | ICD-10-CM

## 2022-10-07 DIAGNOSIS — Z96.641 AFTERCARE FOLLOWING RIGHT HIP JOINT REPLACEMENT SURGERY: ICD-10-CM

## 2022-10-07 DIAGNOSIS — Z47.1 AFTERCARE FOLLOWING RIGHT HIP JOINT REPLACEMENT SURGERY: ICD-10-CM

## 2022-10-07 PROCEDURE — 73502 X-RAY EXAM HIP UNI 2-3 VIEWS: CPT

## 2022-10-07 PROCEDURE — 99024 POSTOP FOLLOW-UP VISIT: CPT | Performed by: ORTHOPAEDIC SURGERY

## 2022-10-07 NOTE — LETTER
10/7/2022    Patient: Celeste Price   YOB: 1955   Date of Visit: 10/7/2022     Kalyan James MD  [address] [address]  Via Fax: [phone]    Dear Kalyan James MD,      Thank you for referring Ms. Gurmeet Bellamy to [facility] for evaluation. My notes for this consultation are attached. If you have questions, please do not hesitate to call me. I look forward to following your patient along with you.       Sincerely,    Teddie Epley, DO

## 2022-10-07 NOTE — PROGRESS NOTES
Identified pt with two pt identifiers (name and ). Reviewed chart in preparation for visit and have obtained necessary documentation. Emory Leonard is a 69 y.o. female  Chief Complaint   Patient presents with    Surgical Follow-up     RT Hip     Visit Vitals  /71 (BP 1 Location: Right arm, BP Patient Position: Sitting, BP Cuff Size: Large adult)   Pulse 81   Temp (!) 96.3 °F (35.7 °C) (Tympanic)   Ht 5' 4\" (1.626 m)   Wt 178 lb (80.7 kg)   SpO2 98%   BMI 30.55 kg/m²     1. Have you been to the ER, urgent care clinic since your last visit? Hospitalized since your last visit? No    2. Have you seen or consulted any other health care providers outside of the [address] [place] [address] since your last visit? Include any pap smears or colon screening.  No

## 2022-10-07 NOTE — PROGRESS NOTES
Melodie Suazo  is here for a follow up visit from a total hip arthroplasty on the right side. The patient is doing well, with no medical complications since discharge. Pain has been appropriate since surgery,and the patient is progressing well with physical therapy. Current Outpatient Medications on File Prior to Visit   Medication Sig Dispense Refill    busPIRone (BUSPAR) 10 mg tablet Take  by mouth three (3) times daily as needed. biotin 5,000 mcg TbDi Take 1 Tablet by mouth. cholecalciferol (VITAMIN D3) (5000 Units/125 mcg) tab tablet Take 5,000 Units by mouth in the morning. ferrous fumarate/vit Bcomp,C (SUPER B COMPLEX PO) Take 1 Tablet by mouth. acetaminophen (TYLENOL) 500 mg tablet Take  by mouth every six (6) hours as needed for Pain. lisinopril-hydroCHLOROthiazide (PRINZIDE, ZESTORETIC) 20-25 mg per tablet Take 1 tablet by mouth once daily (Patient taking differently: Take 1 Tablet by mouth daily.) 90 Tablet 0    MV-MN/FOLIC ACID/CALCIUM/VIT K (ONE-A-DAY WOMEN'S 50 PLUS PO) Take 1 Tab by mouth daily. No current facility-administered medications on file prior to visit. ROS:  General: denies agitation, major chest pain, unexpected weakness  Hip pain is managed with nothing. Skin: healing wound is without issue. Strength: appropriate weakness of involved extremity is resolving since surgery      Physical Examination:    Blood pressure 125/71, pulse 81, temperature (!) 96.3 °F (35.7 °C), temperature source Tympanic, height 5' 4\" (1.626 m), weight 178 lb (80.7 kg), SpO2 98 %. Skin: no significant drainage, no wound dehiscence. Sensation intact to light touch at level of wound and distally. Lateral femoral cutaneous nerve function is intact.   Strength is 5/5 with hip flexion and abduction  Leg lengths are clinically equal  Distal swelling is noted, but appropriate for postoperative course  Distal capillary refill less than 2 seconds      Imaging:    Postoperative xrays are reviewed, they indicate a right total hip arthroplasty in excellent position without evidence of loosening or failure. Leg lengths are equal through the hip.       Assessment: Status post right total hip arthroplasty    Plan:  Patient will continue physical therapy, with the goal of outpatient therapy and then home exercise program as soon as is possible  Wound care and dental prophylaxis instructions were reviewed  Continue to weight bear as tolerated without restriction, except to keep to the positions of comfort  Deep Venous Thrombosis Prophylaxis: none    Follow up will be 8 weeks,  no xrays on follow up

## 2022-12-07 ENCOUNTER — OFFICE VISIT (OUTPATIENT)
Dept: ORTHOPEDIC SURGERY | Age: 67
End: 2022-12-07
Payer: MEDICARE

## 2022-12-07 VITALS
WEIGHT: 178.4 LBS | RESPIRATION RATE: 16 BRPM | OXYGEN SATURATION: 99 % | TEMPERATURE: 98.6 F | DIASTOLIC BLOOD PRESSURE: 65 MMHG | SYSTOLIC BLOOD PRESSURE: 137 MMHG | HEIGHT: 64 IN | BODY MASS INDEX: 30.46 KG/M2 | HEART RATE: 68 BPM

## 2022-12-07 DIAGNOSIS — Z96.641 AFTERCARE FOLLOWING RIGHT HIP JOINT REPLACEMENT SURGERY: Primary | ICD-10-CM

## 2022-12-07 DIAGNOSIS — Z47.1 AFTERCARE FOLLOWING RIGHT HIP JOINT REPLACEMENT SURGERY: Primary | ICD-10-CM

## 2022-12-07 NOTE — LETTER
12/7/2022    Patient: Benjamín Murdock   YOB: 1955   Date of Visit: 12/7/2022     Abdirahman Gotti MD  [address] [address]  Via Fax: [phone]    Dear Abdirahman Gotti MD,      Thank you for referring Ms. Mathieu Gonzalez to [facility] for evaluation. My notes for this consultation are attached. If you have questions, please do not hesitate to call me. I look forward to following your patient along with you.       Sincerely,    [place] Stable, DO

## 2022-12-07 NOTE — PROGRESS NOTES
12/7/2022    Chief Complaint: Follow up for right hip replacement    HPI: Sal Nice is a 69 y.o.  who is now four months status post right hip  arthroplasty. The patient states that they are doing well. The preoperative pain is gone and the postoperative pain is minimal, though she never did go to PT. Regular exercises have helped with residual symptoms. Past Medical History:   Diagnosis Date    Anxiety state, unspecified     Asthma     as a child till age 13    Depression     Diverticulosis     DJD (Degenerative Joint Disease) of Knee     GERD (gastroesophageal reflux disease)     Hypercholesterolemia     Hypertension     Palmoplantar keratoderma     Prepyloric ulcer 2008       Past Surgical History:   Procedure Laterality Date    ENDOSCOPY, COLON, DIAGNOSTIC      due 2015    HX COLONOSCOPY      HX HIP REPLACEMENT         Current Outpatient Medications on File Prior to Visit   Medication Sig Dispense Refill    busPIRone (BUSPAR) 10 mg tablet Take  by mouth three (3) times daily as needed. biotin 5,000 mcg TbDi Take 1 Tablet by mouth. cholecalciferol (VITAMIN D3) (5000 Units/125 mcg) tab tablet Take 5,000 Units by mouth in the morning. ferrous fumarate/vit Bcomp,C (SUPER B COMPLEX PO) Take 1 Tablet by mouth. acetaminophen (TYLENOL) 500 mg tablet Take  by mouth every six (6) hours as needed for Pain. MV-MN/FOLIC ACID/CALCIUM/VIT K (ONE-A-DAY WOMEN'S 50 PLUS PO) Take 1 Tab by mouth daily. lisinopril-hydroCHLOROthiazide (PRINZIDE, ZESTORETIC) 20-25 mg per tablet Take 1 tablet by mouth once daily (Patient taking differently: Take 1 Tablet by mouth daily.) 90 Tablet 0     No current facility-administered medications on file prior to visit.        No Known Allergies    Family History   Problem Relation Age of Onset    Dementia Mother     Cancer Father         cancer    Hypertension Father     Heart Disease Father     Other Brother 62        [facility]    Cancer Sister 7        Brain Cancer       Social History     Socioeconomic History    Marital status: LIFE PARTNER   Tobacco Use    Smoking status: Former     Packs/day: 0.50     Years: 5.00     Pack years: 2.50     Types: Cigarettes     Quit date: 2000     Years since quittin.8    Smokeless tobacco: Never   Vaping Use    Vaping Use: Never used   Substance and Sexual Activity    Alcohol use: No    Drug use: No         Review of Systems:       General: Denies headache, lethargy, fever, weight loss  Ears/Nose/Throat: Denies ear discharge, drainage, nosebleeds, hoarse voice, dental problems  Cardiovascular: Denies chest pain, shortness of breath  Lungs: Denies chest pain, breathing problems, wheezing, pneumonia  Stomach: Denies stomach pain, heartburn, constipation, irritable bowel  Skin: Denies rash, sores, open wounds  Musculoskeletal: right hip lateral pain  Genitourinary: Denies dysuria, hematuria, polyuria  Gastrointestinal: Denies constipation, obstipation, diarrhea  Neurological: Denies changes in sight, smell, hearing, taste, seizures. Denies loss of consciousness.   Psychiatric: Denies depression, sleep pattern changes, anxiety, change in personality  Endocrine: Denies mood swings, heat or cold intolerance  Hematologic/Lymphatic: Denies anemia, purpura, petechia  Allergic/Immunologic: Denies swelling of throat, pain or swelling at lymph nodes      Physical Examination:    Visit Vitals  /65 (BP 1 Location: Right upper arm, BP Patient Position: Sitting, BP Cuff Size: Large adult)   Pulse 68   Temp 98.6 °F (37 °C) (Oral)   Resp 16   Ht 5' 4\" (1.626 m)   Wt 178 lb 6.4 oz (80.9 kg)   SpO2 99%   BMI 30.62 kg/m²        General: AOX3, no apparent distress  Psychiatric: mood and affect appropriate  Lungs: breathing is symmetric and unlabored bilaterally  Heart: regular rate and rhythm  Abdomen: no guarding  Head: normocephalic, atraumatic  Skin: No significant abnormalities, good turgor  Sensation intact to light touch: L1-S1 dermatomes  Muscular exam: 5/5 strength in all major muscle groups unless noted in specialty exam.    Extremities:      Left upper extremity: Full active and passive range of motion without pain, deformity, no open wound, strength 5/5 in all major muscle groups. Right upper extremity: Full active and passive range of motion without pain, deformity, no open wound, strength 5/5 in all major muscle groups. Left lower extremity: Full active and passive range of motion without pain, deformity, no open wound, strength 5/5 in all major muscle groups. Right lower extremity:  Well healed surgical wound is noted. Patient ambulates with no device and mild limp. No deformity is noted. Circumduction of the hip does not cause arthritic pain as it had preoperatively. Strength testing is indicative of 4/5 strength at hip flexion, extension,  5/5 knee flexion and extension, tibialis anterior, EHL, and FHL. Sensation is intact to light touch in the L1-S1 dermatomes with lateral femoral cutaneous nerve function intact. Mild TTP lateral hip. Capillary refill is less than 2 seconds distally. Diagnostics:    Pertinent Xrays:  Xrays are available of the right hip, they indicate a total hip arthroplasty in excellent position without evidence of loosening or failure. Leg lengths are equal.        Assessment: Aftercare, status post right hip arthroplasty    Plan:  Rosita Daigle will continue physical therapy exercises. We discussed the importance of continued vigilance to this end. We also discussed dental prophylaxis and safe activities and reasonable life choices regarding activity level. Patient stated their understanding. Pain control for now will be as needed only, and will be patient directed. PT for peritrochanteric symptoms     will follow up in 6 weeks. no x-rays on follow up. Ms. Yen Garcia has a reminder for a \"due or due soon\" health maintenance.  I have asked that she contact her primary care provider for follow-up on this health maintenance.

## 2023-02-01 ENCOUNTER — OFFICE VISIT (OUTPATIENT)
Dept: ORTHOPEDIC SURGERY | Age: 68
End: 2023-02-01
Payer: MEDICARE

## 2023-02-01 VITALS
WEIGHT: 182 LBS | OXYGEN SATURATION: 98 % | HEART RATE: 71 BPM | SYSTOLIC BLOOD PRESSURE: 135 MMHG | TEMPERATURE: 96.3 F | DIASTOLIC BLOOD PRESSURE: 79 MMHG | HEIGHT: 64 IN | BODY MASS INDEX: 31.07 KG/M2

## 2023-02-01 DIAGNOSIS — Z47.1 AFTERCARE FOLLOWING RIGHT HIP JOINT REPLACEMENT SURGERY: Primary | ICD-10-CM

## 2023-02-01 DIAGNOSIS — Z96.641 AFTERCARE FOLLOWING RIGHT HIP JOINT REPLACEMENT SURGERY: Primary | ICD-10-CM

## 2023-02-01 NOTE — PROGRESS NOTES
Identified pt with two pt identifiers (name and ). Reviewed chart in preparation for visit and have obtained necessary documentation. Savanna Calixto is a 69 y.o. female  Chief Complaint   Patient presents with    Surgical Follow-up     RT Hip     Visit Vitals  /79 (BP 1 Location: Left upper arm, BP Patient Position: Sitting, BP Cuff Size: Large adult)   Pulse 71   Temp (!) 96.3 °F (35.7 °C) (Tympanic)   Ht 5' 4\" (1.626 m)   Wt 182 lb (82.6 kg)   SpO2 98%   BMI 31.24 kg/m²     1. Have you been to the ER, urgent care clinic since your last visit? Hospitalized since your last visit? No    2. Have you seen or consulted any other health care providers outside of the [address] [place] [address] since your last visit? Include any pap smears or colon screening.  No

## 2023-02-01 NOTE — Clinical Note
2/1/2023    Patient: Mariama Fritz   YOB: 1955   Date of Visit: 2/1/2023     Mya Tovar MD  Via     Dear Mya Tovar MD,      Thank you for referring Ms. Laverna Moritz to [facility] for evaluation. My notes for this consultation are attached. If you have questions, please do not hesitate to call me. I look forward to following your patient along with you.       Sincerely,    Marquita Concepcion, DO

## 2023-02-01 NOTE — PROGRESS NOTES
2/1/2023    Chief Complaint: Follow up for right hip replacement    HPI: Linn Frank is a 69 y.o.  who is now 6 months status post right hip  arthroplasty. The patient states that they are doing well. The preoperative pain is gone and the postoperative pain is gone. Regular exercises have helped with residual symptoms. She states that she does have some feeling of loss of proprioception, she also feels as though a heel lift on the right side has helped her somewhat regain her balance. Past Medical History:   Diagnosis Date    Anxiety state, unspecified     Asthma     as a child till age 13    Depression     Diverticulosis     DJD (Degenerative Joint Disease) of Knee     GERD (gastroesophageal reflux disease)     Hypercholesterolemia     Hypertension     Palmoplantar keratoderma     Prepyloric ulcer 2008       Past Surgical History:   Procedure Laterality Date    ENDOSCOPY, COLON, DIAGNOSTIC      due 2015    HX COLONOSCOPY      HX HIP REPLACEMENT         Current Outpatient Medications on File Prior to Visit   Medication Sig Dispense Refill    busPIRone (BUSPAR) 10 mg tablet Take  by mouth three (3) times daily as needed. biotin 5,000 mcg TbDi Take 1 Tablet by mouth. cholecalciferol (VITAMIN D3) (5000 Units/125 mcg) tab tablet Take 5,000 Units by mouth in the morning. ferrous fumarate/vit Bcomp,C (SUPER B COMPLEX PO) Take 1 Tablet by mouth. acetaminophen (TYLENOL) 500 mg tablet Take  by mouth every six (6) hours as needed for Pain. lisinopril-hydroCHLOROthiazide (PRINZIDE, ZESTORETIC) 20-25 mg per tablet Take 1 tablet by mouth once daily (Patient taking differently: Take 1 Tablet by mouth daily.) 90 Tablet 0    MV-MN/FOLIC ACID/CALCIUM/VIT K (ONE-A-DAY WOMEN'S 50 PLUS PO) Take 1 Tab by mouth daily. No current facility-administered medications on file prior to visit.        No Known Allergies    Family History   Problem Relation Age of Onset    Dementia Mother     Cancer Father cancer    Hypertension Father     Heart Disease Father     Other Brother 62        [facility]    Cancer Sister 7        Brain Cancer       Social History     Socioeconomic History    Marital status: LIFE PARTNER   Tobacco Use    Smoking status: Former     Packs/day: 0.50     Years: 5.00     Pack years: 2.50     Types: Cigarettes     Quit date: 2000     Years since quittin.9    Smokeless tobacco: Never   Vaping Use    Vaping Use: Never used   Substance and Sexual Activity    Alcohol use: No    Drug use: No         Review of Systems:       General: Denies headache, lethargy, fever, weight loss  Ears/Nose/Throat: Denies ear discharge, drainage, nosebleeds, hoarse voice, dental problems  Cardiovascular: Denies chest pain, shortness of breath  Lungs: Denies chest pain, breathing problems, wheezing, pneumonia  Stomach: Denies stomach pain, heartburn, constipation, irritable bowel  Skin: Denies rash, sores, open wounds  Musculoskeletal: Ambulatory dysfunction  Genitourinary: Denies dysuria, hematuria, polyuria  Gastrointestinal: Denies constipation, obstipation, diarrhea  Neurological: Denies changes in sight, smell, hearing, taste, seizures. Denies loss of consciousness.   Psychiatric: Denies depression, sleep pattern changes, anxiety, change in personality  Endocrine: Denies mood swings, heat or cold intolerance  Hematologic/Lymphatic: Denies anemia, purpura, petechia  Allergic/Immunologic: Denies swelling of throat, pain or swelling at lymph nodes      Physical Examination:    Visit Vitals  /79 (BP 1 Location: Left upper arm, BP Patient Position: Sitting, BP Cuff Size: Large adult)   Pulse 71   Temp (!) 96.3 °F (35.7 °C) (Tympanic)   Ht 5' 4\" (1.626 m)   Wt 182 lb (82.6 kg)   SpO2 98%   BMI 31.24 kg/m²        General: AOX3, no apparent distress  Psychiatric: mood and affect appropriate  Lungs: breathing is symmetric and unlabored bilaterally  Heart: regular rate and rhythm  Abdomen: no guarding  Head: normocephalic, atraumatic  Skin: No significant abnormalities, good turgor  Sensation intact to light touch: L1-S1 dermatomes  Muscular exam: 5/5 strength in all major muscle groups unless noted in specialty exam.    Extremities:      Left upper extremity: Full active and passive range of motion without pain, deformity, no open wound, strength 5/5 in all major muscle groups. Right upper extremity: Full active and passive range of motion without pain, deformity, no open wound, strength 5/5 in all major muscle groups. Left lower extremity: Full active and passive range of motion without pain, deformity, no open wound, strength 5/5 in all major muscle groups. Right lower extremity:  Well healed surgical wound is noted. Patient ambulates with no device and no limp. No deformity is noted. Circumduction of the hip does not cause arthritic pain as it had preoperatively. Strength testing is indicative of 5/5 strength at hip flexion, extension,  5/5 knee flexion and extension, tibialis anterior, EHL, and FHL. Sensation is intact to light touch in the L1-S1 dermatomes with lateral femoral cutaneous nerve function intact. Capillary refill is less than 2 seconds distally. Diagnostics:    Pertinent Xrays:  Xrays are available of the right hip, they indicate a total hip arthroplasty in excellent position without evidence of loosening or failure. Leg lengths are equal.  I actually vernon out the leg length lines compared to her previous preoperative films, I explained to her at length the physiology of leg length differences as well as the many factors that could play into leg length perception, objectively, her hip is no longer or shorter than it was prior to surgery. Assessment: Aftercare, status post right hip arthroplasty    Plan:  Jonathan Guadalupe will continue physical therapy exercises. We discussed the importance of continued vigilance to this end.   We also discussed dental prophylaxis and safe activities and reasonable life choices regarding activity level. Patient stated their understanding. Pain control for now will be as needed only, and will be patient directed. Time in consultation: 21 minutes. Flor Mccain will follow up in 6 months. Right hip x-rays on follow up. Ms. Lan Beckford has a reminder for a \"due or due soon\" health maintenance. I have asked that she contact her primary care provider for follow-up on this health maintenance.

## 2023-03-31 ENCOUNTER — TELEPHONE (OUTPATIENT)
Dept: ORTHOPEDIC SURGERY | Age: 68
End: 2023-03-31

## 2023-03-31 NOTE — TELEPHONE ENCOUNTER
Patient is requesting a [employer] Parking placard form to be completed and mailed out to her house. She would like to be called when the document is signed and ready to mail.

## 2023-07-25 DIAGNOSIS — Z96.641 AFTERCARE FOLLOWING RIGHT HIP JOINT REPLACEMENT SURGERY: Primary | ICD-10-CM

## 2023-07-25 DIAGNOSIS — Z47.1 AFTERCARE FOLLOWING RIGHT HIP JOINT REPLACEMENT SURGERY: Primary | ICD-10-CM

## 2023-07-26 ENCOUNTER — HOSPITAL ENCOUNTER (OUTPATIENT)
Facility: HOSPITAL | Age: 68
Discharge: HOME | End: 2023-07-29
Payer: MEDICARE

## 2023-07-26 ENCOUNTER — OFFICE VISIT (OUTPATIENT)
Age: 68
End: 2023-07-26
Payer: MEDICARE

## 2023-07-26 VITALS
RESPIRATION RATE: 17 BRPM | DIASTOLIC BLOOD PRESSURE: 82 MMHG | HEIGHT: 64 IN | SYSTOLIC BLOOD PRESSURE: 123 MMHG | WEIGHT: 182 LBS | TEMPERATURE: 97.9 F | BODY MASS INDEX: 31.07 KG/M2 | OXYGEN SATURATION: 100 % | HEART RATE: 69 BPM

## 2023-07-26 DIAGNOSIS — Z96.641 AFTERCARE FOLLOWING RIGHT HIP JOINT REPLACEMENT SURGERY: Primary | ICD-10-CM

## 2023-07-26 DIAGNOSIS — Z47.1 AFTERCARE FOLLOWING RIGHT HIP JOINT REPLACEMENT SURGERY: ICD-10-CM

## 2023-07-26 DIAGNOSIS — Z96.641 AFTERCARE FOLLOWING RIGHT HIP JOINT REPLACEMENT SURGERY: ICD-10-CM

## 2023-07-26 DIAGNOSIS — Z47.1 AFTERCARE FOLLOWING RIGHT HIP JOINT REPLACEMENT SURGERY: Primary | ICD-10-CM

## 2023-07-26 PROCEDURE — 99213 OFFICE O/P EST LOW 20 MIN: CPT | Performed by: ORTHOPAEDIC SURGERY

## 2023-07-26 PROCEDURE — 3079F DIAST BP 80-89 MM HG: CPT | Performed by: ORTHOPAEDIC SURGERY

## 2023-07-26 PROCEDURE — 73502 X-RAY EXAM HIP UNI 2-3 VIEWS: CPT

## 2023-07-26 PROCEDURE — 1123F ACP DISCUSS/DSCN MKR DOCD: CPT | Performed by: ORTHOPAEDIC SURGERY

## 2023-07-26 PROCEDURE — 3074F SYST BP LT 130 MM HG: CPT | Performed by: ORTHOPAEDIC SURGERY

## 2023-07-26 RX ORDER — RANOLAZINE 500 MG/1
500 TABLET, EXTENDED RELEASE ORAL 2 TIMES DAILY
COMMUNITY
Start: 2023-07-19

## 2023-07-26 RX ORDER — HYDROXYZINE PAMOATE 25 MG/1
25 CAPSULE ORAL 3 TIMES DAILY PRN
COMMUNITY

## 2023-07-26 ASSESSMENT — PATIENT HEALTH QUESTIONNAIRE - PHQ9
SUM OF ALL RESPONSES TO PHQ QUESTIONS 1-9: 0
2. FEELING DOWN, DEPRESSED OR HOPELESS: 0
SUM OF ALL RESPONSES TO PHQ QUESTIONS 1-9: 0
SUM OF ALL RESPONSES TO PHQ QUESTIONS 1-9: 0
SUM OF ALL RESPONSES TO PHQ9 QUESTIONS 1 & 2: 0
1. LITTLE INTEREST OR PLEASURE IN DOING THINGS: 0
SUM OF ALL RESPONSES TO PHQ QUESTIONS 1-9: 0

## 2023-07-26 NOTE — PROGRESS NOTES
7/26/2023    Chief Complaint: Follow up for right hip replacement    HPI: Aquilino Breen is a 77 y.o.  who is now one year years status post right hip  arthroplasty. The patient states that they are doing well. The preoperative pain is gone and the postoperative pain is gone. Regular exercises have helped with residual symptoms. She does have back pain that radiates to the right. Past Medical History:   Diagnosis Date    Anxiety state, unspecified     Asthma     as a child till age 13    Depression     Diverticulosis     GERD (gastroesophageal reflux disease)     Hypercholesterolemia     Hypertension     Palmoplantar keratoderma        Past Surgical History:   Procedure Laterality Date    COLONOSCOPY      COLONOSCOPY      due 2015    TOTAL HIP ARTHROPLASTY         Current Outpatient Medications on File Prior to Visit   Medication Sig Dispense Refill    ranolazine (RANEXA) 500 MG extended release tablet Take 1 tablet by mouth 2 times daily      hydrOXYzine pamoate (VISTARIL) 25 MG capsule Take 1 capsule by mouth 3 times daily as needed for Itching      acetaminophen (TYLENOL) 500 MG tablet Take by mouth every 6 hours as needed      Biotin 5 MG TBDP Take 1 tablet by mouth      lisinopril-hydroCHLOROthiazide (PRINZIDE;ZESTORETIC) 20-25 MG per tablet Take 1 tablet by mouth once daily      busPIRone (BUSPAR) 10 MG tablet Take by mouth 3 times daily as needed (Patient not taking: Reported on 7/26/2023)      vitamin D3 (CHOLECALCIFEROL) 125 MCG (5000 UT) TABS tablet Take by mouth daily (Patient not taking: Reported on 7/26/2023)       No current facility-administered medications on file prior to visit.        No Known Allergies    Family History   Problem Relation Age of Onset    Cancer Sister 9        Brain Cancer    Other Brother 62        [facility]    Hypertension Father     Cancer Father         cancer    Dementia Mother     Heart Disease Father        Social History     Socioeconomic History

## 2023-12-27 ENCOUNTER — HOSPITAL ENCOUNTER (OUTPATIENT)
Facility: HOSPITAL | Age: 68
Setting detail: RECURRING SERIES
Discharge: HOME | End: 2023-12-30
Payer: MEDICARE

## 2023-12-27 PROCEDURE — 97162 PT EVAL MOD COMPLEX 30 MIN: CPT

## 2023-12-27 NOTE — THERAPY EVALUATION
One way toward a seat with armrests and one  way toward a seat without arm rests. (4) able to transfer safely with only minor use of hands  (3) able to transfer safely with definite need of hands  (2) able to transfer with verbal cueing and/or supervision  (1) needs one person to assist  (0) needs two people to assist or supervise to be safe    6. Standing unsupported with eyes closed __0__  Instruction: Close your eyes and stand still for 10 seconds. (4) able to stand 10 seconds safely  (3) able to stand 10 seconds with supervision  (2) able to stand 3 seconds  (1) unable to keep eyes closed 3 seconds but stays steady  (0) needs help to keep from falling    7. Standing unsupported with feet together __0___  Instruction: Place your feet together and stand without holding. (4) able to place feet together independently and stand 1 minute safely  (3) able to place feet together independently and stand for 1 minute with supervision  (2) able to place feet together independently but unable to hold for 30 seconds  (1) needs help to attain position but able to stand 15 seconds feet together  (0) needs help to attain position and unable to hold for 15 seconds  THE FOLLOWING ITEMS ARE TO BE PERFORMED WHILE STANDING UNSUPPORTED. 8. Reaching forward with outstretched arms __0___  Instruction: Lift arm to 90 degrees. Stretch out your fingers and reach forward as far as you can. (Examiner  places a ruler at end of fingertips when arm is at 90 degrees. Fingers should not touch the ruler while  reaching forward. The recorded measure is the distance forward that the fingers reach while the subject is in  the most forward lean position.)  (4) can reach forward confidently >10 inches  (3) can reach forward >5 inches safely  (2) can reach forward >2 inches safely  (1) reaches forward but needs supervision  (0) needs help to keep from falling    9.   object from the floor ___0__  Instruction:  the shoe/slipper which

## 2023-12-28 ENCOUNTER — APPOINTMENT (OUTPATIENT)
Facility: HOSPITAL | Age: 68
DRG: 552 | End: 2023-12-28
Payer: MEDICARE

## 2023-12-28 ENCOUNTER — HOSPITAL ENCOUNTER (INPATIENT)
Facility: HOSPITAL | Age: 68
LOS: 9 days | Discharge: ANOTHER HOSPITAL | DRG: 552 | End: 2024-01-06
Attending: STUDENT IN AN ORGANIZED HEALTH CARE EDUCATION/TRAINING PROGRAM | Admitting: STUDENT IN AN ORGANIZED HEALTH CARE EDUCATION/TRAINING PROGRAM
Payer: MEDICARE

## 2023-12-28 DIAGNOSIS — R93.89 ABNORMAL COMPUTED TOMOGRAPHY ANGIOGRAPHY (CTA): ICD-10-CM

## 2023-12-28 DIAGNOSIS — I63.9 CEREBROVASCULAR ACCIDENT (CVA), UNSPECIFIED MECHANISM (HCC): Primary | ICD-10-CM

## 2023-12-28 PROBLEM — I61.9 CVA (CEREBROVASCULAR ACCIDENT DUE TO INTRACEREBRAL HEMORRHAGE) (HCC): Status: ACTIVE | Noted: 2023-12-28

## 2023-12-28 LAB
ALBUMIN SERPL-MCNC: 3.8 G/DL (ref 3.5–5)
ALBUMIN/GLOB SERPL: 0.9 (ref 1.1–2.2)
ALP SERPL-CCNC: 86 U/L (ref 45–117)
ALT SERPL-CCNC: 23 U/L (ref 12–78)
ANION GAP SERPL CALC-SCNC: 6 MMOL/L (ref 5–15)
AST SERPL W P-5'-P-CCNC: 29 U/L (ref 15–37)
BASOPHILS # BLD: 0.1 K/UL (ref 0–0.1)
BASOPHILS NFR BLD: 1 % (ref 0–1)
BILIRUB SERPL-MCNC: 0.4 MG/DL (ref 0.2–1)
BUN SERPL-MCNC: 21 MG/DL (ref 6–20)
BUN/CREAT SERPL: 18 (ref 12–20)
CA-I BLD-MCNC: 10 MG/DL (ref 8.5–10.1)
CHLORIDE SERPL-SCNC: 105 MMOL/L (ref 97–108)
CO2 SERPL-SCNC: 26 MMOL/L (ref 21–32)
CREAT SERPL-MCNC: 1.18 MG/DL (ref 0.55–1.02)
DIFFERENTIAL METHOD BLD: ABNORMAL
EKG ATRIAL RATE: 70 BPM
EKG DIAGNOSIS: NORMAL
EKG P AXIS: 51 DEGREES
EKG P-R INTERVAL: 148 MS
EKG Q-T INTERVAL: 374 MS
EKG QRS DURATION: 76 MS
EKG QTC CALCULATION (BAZETT): 403 MS
EKG R AXIS: -10 DEGREES
EKG T AXIS: 15 DEGREES
EKG VENTRICULAR RATE: 70 BPM
EOSINOPHIL # BLD: 0.1 K/UL (ref 0–0.4)
EOSINOPHIL NFR BLD: 2 % (ref 0–7)
ERYTHROCYTE [DISTWIDTH] IN BLOOD BY AUTOMATED COUNT: 14.8 % (ref 11.5–14.5)
GLOBULIN SER CALC-MCNC: 4.3 G/DL (ref 2–4)
GLUCOSE SERPL-MCNC: 103 MG/DL (ref 65–100)
HCT VFR BLD AUTO: 40.3 % (ref 35–47)
HGB BLD-MCNC: 13.3 G/DL (ref 11.5–16)
IMM GRANULOCYTES # BLD AUTO: 0 K/UL (ref 0–0.04)
IMM GRANULOCYTES NFR BLD AUTO: 0 % (ref 0–0.5)
INR PPP: 1 (ref 0.9–1.1)
LYMPHOCYTES # BLD: 3.3 K/UL (ref 0.8–3.5)
LYMPHOCYTES NFR BLD: 55 % (ref 12–49)
MCH RBC QN AUTO: 26.5 PG (ref 26–34)
MCHC RBC AUTO-ENTMCNC: 33 G/DL (ref 30–36.5)
MCV RBC AUTO: 80.4 FL (ref 80–99)
MONOCYTES # BLD: 0.6 K/UL (ref 0–1)
MONOCYTES NFR BLD: 10 % (ref 5–13)
NEUTS SEG # BLD: 1.9 K/UL (ref 1.8–8)
NEUTS SEG NFR BLD: 32 % (ref 32–75)
NRBC # BLD: 0 K/UL (ref 0–0.01)
NRBC BLD-RTO: 0 PER 100 WBC
PLATELET # BLD AUTO: 343 K/UL (ref 150–400)
PMV BLD AUTO: 10.2 FL (ref 8.9–12.9)
POTASSIUM SERPL-SCNC: 4.7 MMOL/L (ref 3.5–5.1)
PROT SERPL-MCNC: 8.1 G/DL (ref 6.4–8.2)
PROTHROMBIN TIME: 13.7 SEC (ref 11.9–14.6)
RBC # BLD AUTO: 5.01 M/UL (ref 3.8–5.2)
SODIUM SERPL-SCNC: 137 MMOL/L (ref 136–145)
TROPONIN I SERPL HS-MCNC: 21 NG/L (ref 0–51)
WBC # BLD AUTO: 6 K/UL (ref 3.6–11)

## 2023-12-28 PROCEDURE — 0042T CT BRAIN PERFUSION: CPT

## 2023-12-28 PROCEDURE — 6360000002 HC RX W HCPCS: Performed by: STUDENT IN AN ORGANIZED HEALTH CARE EDUCATION/TRAINING PROGRAM

## 2023-12-28 PROCEDURE — 2580000003 HC RX 258: Performed by: STUDENT IN AN ORGANIZED HEALTH CARE EDUCATION/TRAINING PROGRAM

## 2023-12-28 PROCEDURE — 1100000000 HC RM PRIVATE

## 2023-12-28 PROCEDURE — 6360000004 HC RX CONTRAST MEDICATION: Performed by: STUDENT IN AN ORGANIZED HEALTH CARE EDUCATION/TRAINING PROGRAM

## 2023-12-28 PROCEDURE — 85025 COMPLETE CBC W/AUTO DIFF WBC: CPT

## 2023-12-28 PROCEDURE — 99285 EMERGENCY DEPT VISIT HI MDM: CPT

## 2023-12-28 PROCEDURE — 6370000000 HC RX 637 (ALT 250 FOR IP): Performed by: STUDENT IN AN ORGANIZED HEALTH CARE EDUCATION/TRAINING PROGRAM

## 2023-12-28 PROCEDURE — 4A03X5D MEASUREMENT OF ARTERIAL FLOW, INTRACRANIAL, EXTERNAL APPROACH: ICD-10-PCS | Performed by: STUDENT IN AN ORGANIZED HEALTH CARE EDUCATION/TRAINING PROGRAM

## 2023-12-28 PROCEDURE — 93005 ELECTROCARDIOGRAM TRACING: CPT | Performed by: STUDENT IN AN ORGANIZED HEALTH CARE EDUCATION/TRAINING PROGRAM

## 2023-12-28 PROCEDURE — 84484 ASSAY OF TROPONIN QUANT: CPT

## 2023-12-28 PROCEDURE — 71045 X-RAY EXAM CHEST 1 VIEW: CPT

## 2023-12-28 PROCEDURE — 80053 COMPREHEN METABOLIC PANEL: CPT

## 2023-12-28 PROCEDURE — 36415 COLL VENOUS BLD VENIPUNCTURE: CPT

## 2023-12-28 PROCEDURE — 85610 PROTHROMBIN TIME: CPT

## 2023-12-28 PROCEDURE — 70450 CT HEAD/BRAIN W/O DYE: CPT

## 2023-12-28 PROCEDURE — 70496 CT ANGIOGRAPHY HEAD: CPT

## 2023-12-28 RX ORDER — ONDANSETRON 4 MG/1
4 TABLET, ORALLY DISINTEGRATING ORAL EVERY 8 HOURS PRN
Status: DISCONTINUED | OUTPATIENT
Start: 2023-12-28 | End: 2024-01-06 | Stop reason: HOSPADM

## 2023-12-28 RX ORDER — SODIUM CHLORIDE 9 MG/ML
INJECTION, SOLUTION INTRAVENOUS PRN
Status: DISCONTINUED | OUTPATIENT
Start: 2023-12-28 | End: 2024-01-06 | Stop reason: HOSPADM

## 2023-12-28 RX ORDER — ATORVASTATIN CALCIUM 40 MG/1
80 TABLET, FILM COATED ORAL NIGHTLY
Status: DISCONTINUED | OUTPATIENT
Start: 2023-12-28 | End: 2023-12-30

## 2023-12-28 RX ORDER — SODIUM CHLORIDE 0.9 % (FLUSH) 0.9 %
5-40 SYRINGE (ML) INJECTION PRN
Status: DISCONTINUED | OUTPATIENT
Start: 2023-12-28 | End: 2024-01-06 | Stop reason: HOSPADM

## 2023-12-28 RX ORDER — BUSPIRONE HYDROCHLORIDE 10 MG/1
10 TABLET ORAL 3 TIMES DAILY
Status: DISCONTINUED | OUTPATIENT
Start: 2023-12-28 | End: 2024-01-01

## 2023-12-28 RX ORDER — ASPIRIN 325 MG
325 TABLET ORAL
Status: COMPLETED | OUTPATIENT
Start: 2023-12-28 | End: 2023-12-28

## 2023-12-28 RX ORDER — CLOPIDOGREL BISULFATE 75 MG/1
75 TABLET ORAL DAILY
Status: DISCONTINUED | OUTPATIENT
Start: 2023-12-29 | End: 2023-12-30

## 2023-12-28 RX ORDER — ENOXAPARIN SODIUM 100 MG/ML
40 INJECTION SUBCUTANEOUS DAILY
Status: DISCONTINUED | OUTPATIENT
Start: 2023-12-28 | End: 2024-01-06 | Stop reason: HOSPADM

## 2023-12-28 RX ORDER — LABETALOL HYDROCHLORIDE 5 MG/ML
10 INJECTION, SOLUTION INTRAVENOUS EVERY 6 HOURS PRN
Status: DISCONTINUED | OUTPATIENT
Start: 2023-12-28 | End: 2024-01-06 | Stop reason: HOSPADM

## 2023-12-28 RX ORDER — HYDROXYZINE PAMOATE 25 MG/1
25 CAPSULE ORAL 3 TIMES DAILY PRN
Status: DISCONTINUED | OUTPATIENT
Start: 2023-12-28 | End: 2024-01-06 | Stop reason: HOSPADM

## 2023-12-28 RX ORDER — POLYETHYLENE GLYCOL 3350 17 G/17G
17 POWDER, FOR SOLUTION ORAL DAILY PRN
Status: DISCONTINUED | OUTPATIENT
Start: 2023-12-28 | End: 2024-01-06 | Stop reason: HOSPADM

## 2023-12-28 RX ORDER — RANOLAZINE 500 MG/1
500 TABLET, EXTENDED RELEASE ORAL 2 TIMES DAILY
Status: DISCONTINUED | OUTPATIENT
Start: 2023-12-28 | End: 2024-01-06 | Stop reason: HOSPADM

## 2023-12-28 RX ORDER — LISINOPRIL AND HYDROCHLOROTHIAZIDE 25; 20 MG/1; MG/1
1 TABLET ORAL DAILY
Status: DISCONTINUED | OUTPATIENT
Start: 2023-12-28 | End: 2023-12-30

## 2023-12-28 RX ORDER — SODIUM CHLORIDE 0.9 % (FLUSH) 0.9 %
5-40 SYRINGE (ML) INJECTION EVERY 12 HOURS SCHEDULED
Status: DISCONTINUED | OUTPATIENT
Start: 2023-12-28 | End: 2024-01-06 | Stop reason: HOSPADM

## 2023-12-28 RX ORDER — ONDANSETRON 2 MG/ML
4 INJECTION INTRAMUSCULAR; INTRAVENOUS EVERY 6 HOURS PRN
Status: DISCONTINUED | OUTPATIENT
Start: 2023-12-28 | End: 2024-01-06 | Stop reason: HOSPADM

## 2023-12-28 RX ORDER — CLOPIDOGREL 300 MG/1
300 TABLET, FILM COATED ORAL
Status: COMPLETED | OUTPATIENT
Start: 2023-12-28 | End: 2023-12-28

## 2023-12-28 RX ADMIN — IOPAMIDOL 100 ML: 755 INJECTION, SOLUTION INTRAVENOUS at 14:30

## 2023-12-28 RX ADMIN — ENOXAPARIN SODIUM 40 MG: 100 INJECTION SUBCUTANEOUS at 22:56

## 2023-12-28 RX ADMIN — CLOPIDOGREL BISULFATE 300 MG: 300 TABLET, FILM COATED ORAL at 16:34

## 2023-12-28 RX ADMIN — SODIUM CHLORIDE, PRESERVATIVE FREE 10 ML: 5 INJECTION INTRAVENOUS at 22:56

## 2023-12-28 RX ADMIN — ATORVASTATIN CALCIUM 80 MG: 40 TABLET, FILM COATED ORAL at 22:56

## 2023-12-28 RX ADMIN — ASPIRIN 325 MG: 325 TABLET ORAL at 16:34

## 2023-12-28 ASSESSMENT — LIFESTYLE VARIABLES
HOW OFTEN DO YOU HAVE A DRINK CONTAINING ALCOHOL: NEVER
HOW MANY STANDARD DRINKS CONTAINING ALCOHOL DO YOU HAVE ON A TYPICAL DAY: PATIENT DOES NOT DRINK

## 2023-12-28 ASSESSMENT — PAIN - FUNCTIONAL ASSESSMENT
PAIN_FUNCTIONAL_ASSESSMENT: 0-10
PAIN_FUNCTIONAL_ASSESSMENT: NONE - DENIES PAIN

## 2023-12-28 ASSESSMENT — PAIN DESCRIPTION - LOCATION: LOCATION: LEG

## 2023-12-28 ASSESSMENT — PAIN DESCRIPTION - ORIENTATION: ORIENTATION: LEFT

## 2023-12-28 ASSESSMENT — PAIN SCALES - GENERAL: PAINLEVEL_OUTOF10: 7

## 2023-12-28 NOTE — ED TRIAGE NOTES
Pt states was referred to ED for r/o CVA.  States weakness in left arm and leg for 2 weeks.  Sensation present bilaterally in triage, left arm noted to be weaker than right.

## 2023-12-28 NOTE — H&P
Hospital Medicine History & Physical      Date of Admission: 12/28/2023    Date of Service:  12/28/2023    [x]Admitted to Inpatient with expected LOS greater than two midnights due to medical therapy.  []Placed in Observation status.    Chief Admission Complaint:  left sided wekness    Presenting Admission History: This is a 68-year-old female with past medical history of hypertension, anxiety, angina who presented to the emergency department with left-sided weakness.  Patient states she had a fall about 2 weeks ago and has had weakness on the left upper and lower extremities.  She also states she does have some right-sided lower extremity weakness.  States that she thought this was due to her fall and pain associated with weakness.  She went to her physical therapist and was told her weakness was not consistent pain related.  She was sent to her primary care physician after physical exam patient was sent to the emergency department.  Will emergency department patient was found to have left upper and lower extremity weakness.  Labs revealing normal renal function, LFTs, CBC CTA head and neck, CT head with no acute abnormalities.  EKG reviewed normal sinus rhythm.         Assessment/Plan:      #Left sided weakness-rule out stroke  #Hypertension-hold BP medications for now  #Angina -metoprolol Ranexa  #Anxiety-hydroxyzine  #Family history of MS    Plan  CT head CTA head and neck CT perfusion no acute abnormalities  Patient seen by teleneurologist recommending Plavix/aspirin  Med telemetry  Continue aspirin Plavix statin  MRI head with and without contrast  Echocardiogram  Neurochecks  PT/OT      Discussed management of the case in detail w/ the Emergency Department       Physical Exam Performed:      /70   Pulse 70   Temp 98.2 °F (36.8 °C)   Resp 16   Ht 1.626 m (5' 4\")   Wt 80.3 kg (177 lb)   SpO2 100%   BMI 30.38 kg/m²   General: In no acute distress, well developed  Eyes: PERRLA, no conjunctival  for input(s): \"LABA1C\" in the last 72 hours.  Recent Labs     12/28/23  1342   AST 29   ALT 23   BILITOT 0.4   ALKPHOS 86     Recent Labs     12/28/23  1342   INR 1.0        Dexter Mcneill MD

## 2023-12-28 NOTE — ED PROVIDER NOTES
[facility] EMERGENCY DEPT  EMERGENCY DEPARTMENT HISTORY AND PHYSICAL EXAM      Date: 2023  Patient Name: Yusra Fontenot  MRN: [id number]  Birthdate 1955  Date of evaluation: 2023  Provider: Rodrigo Griffiths DO   Note Started: 2:54 PM EST 23    HISTORY OF PRESENT ILLNESS     Chief Complaint   Patient presents with    Extremity Weakness       History Provided By: Patient    HPI: Yusra Fontenot is a 68 y.o. female with past medical history as reviewed below who presents emergency department complaining of left-sided weakness that has been ongoing for the past 2 weeks.  Patient was at her primary care's office prior to arrival, noticed to have objective left upper extremity and left lower extremity weakness and was sent to the emergency department due to concern for stroke.  Patient denies any paresthesias, vision changes, headache, neck pain, vomiting or any other symptoms or complaints.    PAST MEDICAL HISTORY   Past Medical History:  Past Medical History:   Diagnosis Date    Anxiety state, unspecified     Asthma     as a child till age 12    Depression     Diverticulosis     GERD (gastroesophageal reflux disease)     Hypercholesterolemia     Hypertension     Palmoplantar keratoderma        Past Surgical History:  Past Surgical History:   Procedure Laterality Date    COLONOSCOPY      COLONOSCOPY      due 2015    TOTAL HIP ARTHROPLASTY         Family History:  Family History   Problem Relation Age of Onset    Cancer Sister 7        Brain Cancer    Other Brother 57        [facility]    Hypertension Father     Cancer Father         cancer    Dementia Mother     Heart Disease Father        Social History:  Social History     Tobacco Use    Smoking status: Former     Current packs/day: 0.00     Types: Cigarettes     Quit date: 2000     Years since quittin.8    Smokeless tobacco: Never   Substance Use Topics    Alcohol use: No    Drug use: No       Allergies:  No Known

## 2023-12-28 NOTE — ED NOTES
About  a week ago patient started to feel her left side weak,with difficulties walking and urinary incontinence. Patient went to her PCP today and was sent to r/o a stroke. Patient still having decreased movements and sensation on her left side.noe leg. PCP sent patient to have a head CT and an MRI.

## 2023-12-28 NOTE — CARE COORDINATION
12/28/23 4603   Service Assessment   Patient Orientation Alert and Oriented   Cognition Alert   History Provided By Patient   Primary Caregiver Self   Accompanied By/Relationship Pt alone during interview.   Support Systems Spouse/Significant Other;Family Members   Patient's Healthcare Decision Maker is: Legal Next of Kin   PCP Verified by CM Yes  (Shawn Thurman - seen 12/28/23.)   Last Visit to PCP Within last 3 months   Prior Functional Level Independent in ADLs/IADLs;Assistance with the following:;Mobility  (Walker)   Current Functional Level Independent in ADLs/IADLs;Assistance with the following:;Mobility  (Walker)   Can patient return to prior living arrangement Yes   Ability to make needs known: Good   Family able to assist with home care needs: Yes   Would you like for me to discuss the discharge plan with any other family members/significant others, and if so, who? Yes  (Sig Other: Rickie Rodriguez.)   Financial Resources Medicare   Community Resources None   Social/Functional History   Lives With Significant other   Type of Home House   Home Layout One level   Home Access Level entry   Bathroom Shower/Tub Walk-in shower   Bathroom Toilet Handicap height   Bathroom Equipment Grab bars in shower;Grab bars around toilet   Bathroom Accessibility Accessible   Home Equipment Walker, standard   Receives Help From Family   ADL Assistance Needs assistance   Toileting Needs assistance   Homemaking Assistance Needs assistance   Homemaking Responsibilities Yes   Ambulation Assistance Needs assistance  (Walker)   Transfer Assistance Independent   Active  No   Occupation Retired   Discharge Planning   Type of Residence House   Living Arrangements Spouse/Significant Other   Current Services Prior To Admission None   Potential Assistance Needed N/A   DME Ordered? No   Potential Assistance Purchasing Medications No   Type of Home Care Services None   Patient expects to be discharged to: House   One/Two Story

## 2023-12-29 ENCOUNTER — APPOINTMENT (OUTPATIENT)
Facility: HOSPITAL | Age: 68
DRG: 552 | End: 2023-12-29
Attending: STUDENT IN AN ORGANIZED HEALTH CARE EDUCATION/TRAINING PROGRAM
Payer: MEDICARE

## 2023-12-29 ENCOUNTER — APPOINTMENT (OUTPATIENT)
Facility: HOSPITAL | Age: 68
DRG: 552 | End: 2023-12-29
Payer: MEDICARE

## 2023-12-29 LAB
CHOLEST SERPL-MCNC: 170 MG/DL
ECHO AO ASC DIAM: 1.6 CM
ECHO AO ASCENDING AORTA INDEX: 0.86 CM/M2
ECHO AO ROOT DIAM: 2.5 CM
ECHO AO ROOT INDEX: 1.34 CM/M2
ECHO AV AREA PEAK VELOCITY: 2.3 CM2
ECHO AV AREA VTI: 2.2 CM2
ECHO AV AREA/BSA PEAK VELOCITY: 1.2 CM2/M2
ECHO AV AREA/BSA VTI: 1.2 CM2/M2
ECHO AV MEAN GRADIENT: 6 MMHG
ECHO AV MEAN VELOCITY: 1.1 M/S
ECHO AV PEAK GRADIENT: 10 MMHG
ECHO AV PEAK VELOCITY: 1.6 M/S
ECHO AV VELOCITY RATIO: 0.75
ECHO AV VTI: 32 CM
ECHO BSA: 1.9 M2
ECHO EST RA PRESSURE: 3 MMHG
ECHO LA AREA 4C: 10.6 CM2
ECHO LA DIAMETER INDEX: 1.61 CM/M2
ECHO LA DIAMETER: 3 CM
ECHO LA MAJOR AXIS: 4.7 CM
ECHO LA TO AORTIC ROOT RATIO: 1.2
ECHO LA VOL MOD A4C: 20 ML (ref 22–52)
ECHO LA VOLUME INDEX MOD A4C: 11 ML/M2 (ref 16–34)
ECHO LV E' LATERAL VELOCITY: 13 CM/S
ECHO LV E' SEPTAL VELOCITY: 9 CM/S
ECHO LV EDV A4C: 69 ML
ECHO LV EDV INDEX A4C: 37 ML/M2
ECHO LV EJECTION FRACTION A4C: 69 %
ECHO LV ESV A4C: 21 ML
ECHO LV ESV INDEX A4C: 11 ML/M2
ECHO LV FRACTIONAL SHORTENING: 39 % (ref 28–44)
ECHO LV INTERNAL DIMENSION DIASTOLE INDEX: 2.63 CM/M2
ECHO LV INTERNAL DIMENSION DIASTOLIC: 4.9 CM (ref 3.9–5.3)
ECHO LV INTERNAL DIMENSION SYSTOLIC INDEX: 1.61 CM/M2
ECHO LV INTERNAL DIMENSION SYSTOLIC: 3 CM
ECHO LV IVSD: 0.7 CM (ref 0.6–0.9)
ECHO LV MASS 2D: 120.8 G (ref 67–162)
ECHO LV MASS INDEX 2D: 65 G/M2 (ref 43–95)
ECHO LV POSTERIOR WALL DIASTOLIC: 0.8 CM (ref 0.6–0.9)
ECHO LV RELATIVE WALL THICKNESS RATIO: 0.33
ECHO LVOT AREA: 2.8 CM2
ECHO LVOT AV VTI INDEX: 0.77
ECHO LVOT DIAM: 1.9 CM
ECHO LVOT MEAN GRADIENT: 3 MMHG
ECHO LVOT PEAK GRADIENT: 6 MMHG
ECHO LVOT PEAK VELOCITY: 1.2 M/S
ECHO LVOT STROKE VOLUME INDEX: 37.3 ML/M2
ECHO LVOT SV: 69.4 ML
ECHO LVOT VTI: 24.5 CM
ECHO MV A VELOCITY: 0.83 M/S
ECHO MV AREA VTI: 2.6 CM2
ECHO MV E DECELERATION TIME (DT): 249 MS
ECHO MV E VELOCITY: 0.62 M/S
ECHO MV E/A RATIO: 0.75
ECHO MV E/E' LATERAL: 4.77
ECHO MV E/E' RATIO (AVERAGED): 5.83
ECHO MV LVOT VTI INDEX: 1.07
ECHO MV MAX VELOCITY: 0.9 M/S
ECHO MV MEAN GRADIENT: 2 MMHG
ECHO MV MEAN VELOCITY: 0.6 M/S
ECHO MV PEAK GRADIENT: 3 MMHG
ECHO MV REGURGITANT PEAK GRADIENT: 34 MMHG
ECHO MV REGURGITANT PEAK VELOCITY: 2.9 M/S
ECHO MV VTI: 26.2 CM
ECHO PV MAX VELOCITY: 0.7 M/S
ECHO PV PEAK GRADIENT: 2 MMHG
ECHO RA AREA 4C: 10.6 CM2
ECHO RA END SYSTOLIC VOLUME APICAL 4 CHAMBER INDEX BSA: 10 ML/M2
ECHO RA VOLUME: 18 ML
ECHO RIGHT VENTRICULAR SYSTOLIC PRESSURE (RVSP): 12 MMHG
ECHO RV BASAL DIMENSION: 2.9 CM
ECHO RV FREE WALL PEAK S': 13 CM/S
ECHO RV TAPSE: 2.2 CM (ref 1.7–?)
ECHO TV REGURGITANT MAX VELOCITY: 1.48 M/S
ECHO TV REGURGITANT PEAK GRADIENT: 9 MMHG
ERYTHROCYTE [DISTWIDTH] IN BLOOD BY AUTOMATED COUNT: 14.7 % (ref 11.5–14.5)
EST. AVERAGE GLUCOSE BLD GHB EST-MCNC: 123 MG/DL
HBA1C MFR BLD: 5.9 % (ref 4–5.6)
HCT VFR BLD AUTO: 37.5 % (ref 35–47)
HDLC SERPL-MCNC: 35 MG/DL
HDLC SERPL: 4.9 (ref 0–5)
HGB BLD-MCNC: 12.6 G/DL (ref 11.5–16)
LDLC SERPL CALC-MCNC: 103 MG/DL (ref 0–100)
LIPID PANEL: ABNORMAL
MCH RBC QN AUTO: 27.1 PG (ref 26–34)
MCHC RBC AUTO-ENTMCNC: 33.6 G/DL (ref 30–36.5)
MCV RBC AUTO: 80.6 FL (ref 80–99)
NRBC # BLD: 0 K/UL (ref 0–0.01)
NRBC BLD-RTO: 0 PER 100 WBC
PLATELET # BLD AUTO: 306 K/UL (ref 150–400)
PMV BLD AUTO: 9.7 FL (ref 8.9–12.9)
RBC # BLD AUTO: 4.65 M/UL (ref 3.8–5.2)
TRIGL SERPL-MCNC: 160 MG/DL
VLDLC SERPL CALC-MCNC: 32 MG/DL
WBC # BLD AUTO: 4.5 K/UL (ref 3.6–11)

## 2023-12-29 PROCEDURE — 83036 HEMOGLOBIN GLYCOSYLATED A1C: CPT

## 2023-12-29 PROCEDURE — 80061 LIPID PANEL: CPT

## 2023-12-29 PROCEDURE — 6360000002 HC RX W HCPCS: Performed by: STUDENT IN AN ORGANIZED HEALTH CARE EDUCATION/TRAINING PROGRAM

## 2023-12-29 PROCEDURE — 6360000004 HC RX CONTRAST MEDICATION: Performed by: STUDENT IN AN ORGANIZED HEALTH CARE EDUCATION/TRAINING PROGRAM

## 2023-12-29 PROCEDURE — 97530 THERAPEUTIC ACTIVITIES: CPT

## 2023-12-29 PROCEDURE — 1100000000 HC RM PRIVATE

## 2023-12-29 PROCEDURE — 97165 OT EVAL LOW COMPLEX 30 MIN: CPT

## 2023-12-29 PROCEDURE — 93306 TTE W/DOPPLER COMPLETE: CPT

## 2023-12-29 PROCEDURE — 85027 COMPLETE CBC AUTOMATED: CPT

## 2023-12-29 PROCEDURE — 97161 PT EVAL LOW COMPLEX 20 MIN: CPT

## 2023-12-29 PROCEDURE — 70553 MRI BRAIN STEM W/O & W/DYE: CPT

## 2023-12-29 PROCEDURE — 92610 EVALUATE SWALLOWING FUNCTION: CPT

## 2023-12-29 PROCEDURE — A9577 INJ MULTIHANCE: HCPCS | Performed by: STUDENT IN AN ORGANIZED HEALTH CARE EDUCATION/TRAINING PROGRAM

## 2023-12-29 PROCEDURE — 6370000000 HC RX 637 (ALT 250 FOR IP): Performed by: STUDENT IN AN ORGANIZED HEALTH CARE EDUCATION/TRAINING PROGRAM

## 2023-12-29 PROCEDURE — 2580000003 HC RX 258: Performed by: STUDENT IN AN ORGANIZED HEALTH CARE EDUCATION/TRAINING PROGRAM

## 2023-12-29 PROCEDURE — 36415 COLL VENOUS BLD VENIPUNCTURE: CPT

## 2023-12-29 RX ORDER — LORAZEPAM 2 MG/ML
0.5 INJECTION INTRAMUSCULAR ONCE
Status: DISCONTINUED | OUTPATIENT
Start: 2023-12-29 | End: 2023-12-29

## 2023-12-29 RX ORDER — LORAZEPAM 0.5 MG/1
0.5 TABLET ORAL ONCE
Status: COMPLETED | OUTPATIENT
Start: 2023-12-29 | End: 2023-12-29

## 2023-12-29 RX ADMIN — BUSPIRONE HYDROCHLORIDE 10 MG: 10 TABLET ORAL at 15:12

## 2023-12-29 RX ADMIN — LORAZEPAM 0.5 MG: 0.5 TABLET ORAL at 12:19

## 2023-12-29 RX ADMIN — ENOXAPARIN SODIUM 40 MG: 100 INJECTION SUBCUTANEOUS at 11:22

## 2023-12-29 RX ADMIN — SODIUM CHLORIDE, PRESERVATIVE FREE 10 ML: 5 INJECTION INTRAVENOUS at 11:24

## 2023-12-29 RX ADMIN — GADOBENATE DIMEGLUMINE 16 ML: 529 INJECTION, SOLUTION INTRAVENOUS at 13:37

## 2023-12-29 RX ADMIN — CLOPIDOGREL BISULFATE 75 MG: 75 TABLET ORAL at 11:24

## 2023-12-29 RX ADMIN — ATORVASTATIN CALCIUM 80 MG: 40 TABLET, FILM COATED ORAL at 20:26

## 2023-12-29 RX ADMIN — BUSPIRONE HYDROCHLORIDE 10 MG: 10 TABLET ORAL at 11:23

## 2023-12-29 RX ADMIN — RANOLAZINE 500 MG: 500 TABLET, FILM COATED, EXTENDED RELEASE ORAL at 20:26

## 2023-12-29 RX ADMIN — SODIUM CHLORIDE, PRESERVATIVE FREE 10 ML: 5 INJECTION INTRAVENOUS at 20:26

## 2023-12-29 RX ADMIN — BUSPIRONE HYDROCHLORIDE 10 MG: 10 TABLET ORAL at 20:26

## 2023-12-29 RX ADMIN — RANOLAZINE 500 MG: 500 TABLET, FILM COATED, EXTENDED RELEASE ORAL at 11:23

## 2023-12-29 ASSESSMENT — PAIN SCALES - GENERAL
PAINLEVEL_OUTOF10: 0
PAINLEVEL_OUTOF10: 0

## 2023-12-29 NOTE — PLAN OF CARE
body clothing? []  1 [x]  2 []  3 []  4   2.  Bathing (including washing, rinsing, drying)? []  1 [x]  2 []  3 []  4   3.  Toileting, which includes using toilet, bedpan or urinal? [] 1 [x]  2 []  3 []  4   4.  Putting on and taking off regular upper body clothing? []  1 []  2 [x]  3 []  4   5.  Taking care of personal grooming such as brushing teeth? []  1 []  2 [x]  3 []  4   6.  Eating meals? []  1 []  2 [x]  3 []  4   © 2007, Trustees of [facility], under license to [employer]. All rights reserved     Score: 15/24     Interpretation of Tool:  Represents clinically-significant functional categories (i.e. Activities of daily living).  Percentage of Impairment CH    0%   CI    1-19% CJ    20-39% CK    40-59% CL    60-79% CM    80-99% CN     100%   [facility]  Score 6-24 24 23 20-22 15-19 10-14 7-9 6     Occupational Therapy Evaluation Charge Determination   History Examination Decision-Making   MEDIUM Complexity : Expanded review of history including physical, cognitive and psychocial history  LOW Complexity: 1-3 Performance deficits relating to physical, cognitive, or psychosocial skills that result in activity limitations and/or participation restrictions MEDIUM Complexity: Patient may present with comorbidities that affect occupational performance. Minimal to moderate modifications of tasks or assist (eg. physical or verbal) with assist is necessary to enable pt to complete eval      Based on the above components, the patient evaluation is determined to be of the following complexity level: Low    Pain Rating:  L knee pain \"spasm\" like - did not rate    Activity Tolerance:   Fair     After treatment patient left in no apparent distress:    Patient left in no apparent distress in bed, Call bell within reach, Side rails x3, and transport at bedside to take pt , bed locked and in lowest position    COMMUNICATION/EDUCATION:   The patient’s plan of care was discussed with: Physical therapist and Registered  nurse    Patient Education  Education Given To: Patient  Education Provided: Role of Therapy;Plan of Care;Home Exercise Program;ADL Adaptive Strategies;Transfer Training;Fall Prevention Strategies;Energy Conservation  Education Method: Verbal  Barriers to Learning: None  Education Outcome: Verbalized understanding;Continued education needed    The supervising occupational therapist and treating occupational therapist assistant have met to review this patient’s progress and plan of care.    This patient’s plan of care is appropriate for delegation to KEVAN.     PT/OT sessions occurred together for increased safety of pt and clinician.    Thank you for this referral,  Saranya Suárez OT  Minutes: 27

## 2023-12-29 NOTE — PROGRESS NOTES
Hospital Medicine Progress Note      Date of Admission: 12/28/2023  Hospital Day: 2    Chief Admission Complaint: Weakness     Subjective: Patient seen and examined this morning.  No acute events overnight.  She was able to obtain MRI with assistance of benzodiazepines.        Assessment/Plan:    68-year-old female with past medical history of hypertension, anxiety, angina who presented to the emergency department with left-sided weakness.  Patient states she had a fall about 2 weeks ago and has had weakness on the left upper and lower extremities.       #Left sided weakness-rule out stroke-MRI negative weakness likely not related to stroke.  #Hypertension-hold BP medications for now  #Angina -metoprolol Ranexa  #Anxiety-hydroxyzine  #Family history of MS     Plan  -CT head CTA head and neck CT perfusion no acute abnormalities  -Patient seen by teleneurologist   -MRI reviewed with no acute intracranial abnormalities no signs of stroke.  Small microvascular disease.  -Continue aspirin Plavix statin  -No arrhythmia on telemetry  -Echo-pending  -Neurochecks  -PT/OT  -Likely need SNF placement      Physical Exam Performed:      General appearance:  No apparent distress  Respiratory:  Normal respiratory effort.   Cardiovascular:  Regular rate and rhythm.  Abdomen:  Soft, non-tender, non-distended.  Musculoskeletal:  No edema  Neurologic:  Non-focal  Psychiatric:   AA&Ox3, CNs II-XII grossly intact.  Left upper and lower extremity weakness 3 out of 5 left upper, 4 out of 5 left lower.  Right lower 4 out of 5 strength as well.  Diminished sensation on the left lower extremity.  Moves all limbs spontaneously  Psych: Appropriate mood and affect    /74   Pulse 75   Temp 98.4 °F (36.9 °C) (Oral)   Resp 18   Ht 1.626 m (5' 4\")   Wt 80.3 kg (177 lb)   SpO2 98%   BMI 30.38 kg/m²     Diet: ADULT DIET; Regular; 3 carb choices (45 gm/meal); Low Fat/Low Chol/High Fiber/2 gm Na  DVT Prophylaxis: [x]PPx LMWH  []SQ

## 2023-12-29 NOTE — PROGRESS NOTES
4 Eyes Skin Assessment     NAME:  Yusra Fontenot  YOB: 1955  MEDICAL RECORD NUMBER:  [id number]    The patient is being assessed for  Admission    I agree that at least one RN has performed a thorough Head to Toe Skin Assessment on the patient. ALL assessment sites listed below have been assessed.      Areas assessed by both nurses:    Head, Face, Ears, Shoulders, Back, Chest, Arms, Elbows, Hands, Sacrum. Buttock, Coccyx, Ischium, Legs. Feet and Heels, and Under Medical Devices         Does the Patient have a Wound? No noted wound(s)       Luis Prevention initiated by RN: No  Wound Care Orders initiated by RN: No    Pressure Injury (Stage 3,4, Unstageable, DTI, NWPT, and Complex wounds) if present, place Wound referral order by RN under : No    New Ostomies, if present place, Ostomy referral order under : No     Nurse 1 eSignature: Electronically signed by Julio Cesar Freeman RN on 12/29/23 at 12:23 AM EST    **SHARE this note so that the co-signing nurse can place an eSignature**    Nurse 2 eSignature: Electronically signed by Madison Pantoja RN on 12/29/23 at 6:26 AM EST

## 2023-12-29 NOTE — CARE COORDINATION
CM reviewed chart. CVA work up. Currently off unit for testing. Awaiting therapy evals.    Discharge plan TBD

## 2023-12-29 NOTE — ED NOTES
ED TO INPATIENT SBAR HANDOFF    Patient Name: Yusra Fontenot   Preferred Name: Yusra  : 1955  68 y.o.   Family/Caregiver Present: no   Code Status Order: Full Code  PO Status: regular  Telemetry Order:   C-SSRS: Risk of Suicide: No Risk  Sitter no  Restraints:     Sepsis Risk Score Sepsis Risk Score: 0.99    Situation  Chief Complaint   Patient presents with    Extremity Weakness     Brief Description of Patient's Condition: arrives to rule out CVA, hx of prior CVA, family hx of MS, comes for extremity weakness  Mental Status: oriented and alert  Arrived from:Home  Imaging:   CT HEAD WO CONTRAST   Final Result   No acute process on noncontrast CT.            CTA HEAD NECK W CONTRAST   Final Result   1.  No large vessel occlusion or significant flow-limiting stenosis.   2.  No evidence of perfusion defects.          CT BRAIN PERFUSION   Final Result   1.  No large vessel occlusion or significant flow-limiting stenosis.   2.  No evidence of perfusion defects.          XR CHEST PORTABLE   Final Result      No acute process on portable chest.         MRI brain with and without contrast    (Results Pending)     Abnormal labs:   Abnormal Labs Reviewed   CBC WITH AUTO DIFFERENTIAL - Abnormal; Notable for the following components:       Result Value    RDW 14.8 (*)     Lymphocytes % 55 (*)     All other components within normal limits   COMPREHENSIVE METABOLIC PANEL - Abnormal; Notable for the following components:    Glucose 103 (*)     BUN 21 (*)     Creatinine 1.18 (*)     Est, Glom Filt Rate 50 (*)     Globulin 4.3 (*)     Albumin/Globulin Ratio 0.9 (*)     All other components within normal limits       Background  Allergies: No Known Allergies  History:   Past Medical History:   Diagnosis Date    Anxiety state, unspecified     Asthma     as a child till age 12    Depression     Diverticulosis     GERD (gastroesophageal reflux disease)     Hypercholesterolemia     Hypertension     Palmoplantar keratoderma

## 2023-12-29 NOTE — PLAN OF CARE
PHYSICAL THERAPY EVALUATION  Patient: Yusra Fontenot (68 y.o. female)  Date: 12/29/2023  Primary Diagnosis: CVA (cerebrovascular accident due to intracerebral hemorrhage) ([facility]) [I61.9]  Cerebrovascular accident (CVA), unspecified mechanism (HCC) [I63.9]       Precautions: Fall Risk, General Precautions                Recommendations for nursing mobility: Encourage HEP in prep for ADLs/mobility; see handout for details, Frequent repositioning to prevent skin breakdown, and Use of BSC for toileting     In place during session: Peripheral IV, External Catheter, and EKG/telemetry     ASSESSMENT  Pt is a 68 y.o. female admitted on 12/28/2023 for left side weakness for 2 weeks; pt currently being treated for CVA work up . Pt semi supine  upon PT arrival, agreeable to evaluation. Pt A&O x 4.  Patient lives with  he has been helping.Used walker since 2 weeks.    Based on the objective data described below, the patient currently presents with impaired functional mobility, impaired ability to perform high-level IADLs, impaired strength, decreased activity tolerance, poor safety awareness, decreased command following, decreased coordination, impaired balance, and impaired posture. (See below for objective details and assist levels).     Overall pt tolerated session fair today with PT/OT. Pt required min-mod for bed mobility and transfers. Pt amb 3-4 side steps feet with mod A, gt belt and RW; demonstrates difficulty moving left side . Pt will benefit from continued skilled PT to address above deficits and return to PLOF. Potential barriers for safe discharge: . Current PT DC recommendation Inpatient Rehabilitation Facility  once medically appropriate.  Shortened session as transport came to get patient for ECHO.PT/OT assisted with changing the gown,bed linen and pad as patient had incontinence and purwick did not work.  GOALS:    Problem: Physical Therapy - Adult  Goal: By Discharge: Performs mobility at highest  Therapy Evaluation Charge Determination   History Examination Presentation Decision-Making   MEDIUM  Complexity : 1-2 comorbidities / personal factors will impact the outcome/ POC  MEDIUM Complexity : 3 Standardized tests and measures addressing body structure, function, activity limitation and / or participation in recreation  MEDIUM Complexity : Evolving with changing characteristics  Other outcome measures [facility] 6  MEDIUM      Based on the above components, the patient evaluation is determined to be of the following complexity level: MEDIUM    Pain Ratin/10   Pain Intervention(s):       Activity Tolerance:   Fair     After treatment patient left in no apparent distress:   Bed locked and in lowest position Patient left in no apparent distress in bed and Call bell within reach and nsg updated.    COMMUNICATION/EDUCATION:   The patient’s plan of care was discussed with: Occupational therapist, Registered nurse, and Case management    Patient Education  Education Given To: Patient  Education Provided: Role of Therapy;Plan of Care;Home Exercise Program;Transfer Training;Fall Prevention Strategies;Precautions  Education Method: Verbal;Demonstration;Teach Back  Education Outcome: Continued education needed;Verbalized understanding         Thank you for this referral.  Neelam Goncalves, PT  Minutes: 25

## 2023-12-30 ENCOUNTER — APPOINTMENT (OUTPATIENT)
Facility: HOSPITAL | Age: 68
DRG: 552 | End: 2023-12-30
Payer: MEDICARE

## 2023-12-30 PROCEDURE — 6360000002 HC RX W HCPCS: Performed by: STUDENT IN AN ORGANIZED HEALTH CARE EDUCATION/TRAINING PROGRAM

## 2023-12-30 PROCEDURE — 6370000000 HC RX 637 (ALT 250 FOR IP): Performed by: STUDENT IN AN ORGANIZED HEALTH CARE EDUCATION/TRAINING PROGRAM

## 2023-12-30 PROCEDURE — 2580000003 HC RX 258: Performed by: STUDENT IN AN ORGANIZED HEALTH CARE EDUCATION/TRAINING PROGRAM

## 2023-12-30 PROCEDURE — 1100000000 HC RM PRIVATE

## 2023-12-30 PROCEDURE — 73562 X-RAY EXAM OF KNEE 3: CPT

## 2023-12-30 RX ORDER — HYDROCHLOROTHIAZIDE 25 MG/1
25 TABLET ORAL DAILY
Status: DISCONTINUED | OUTPATIENT
Start: 2023-12-30 | End: 2024-01-06 | Stop reason: HOSPADM

## 2023-12-30 RX ORDER — LISINOPRIL 20 MG/1
20 TABLET ORAL DAILY
Status: DISCONTINUED | OUTPATIENT
Start: 2023-12-30 | End: 2024-01-06 | Stop reason: HOSPADM

## 2023-12-30 RX ORDER — LISINOPRIL AND HYDROCHLOROTHIAZIDE 25; 20 MG/1; MG/1
1 TABLET ORAL DAILY
Status: CANCELLED | OUTPATIENT
Start: 2023-12-31

## 2023-12-30 RX ORDER — ATORVASTATIN CALCIUM 40 MG/1
40 TABLET, FILM COATED ORAL NIGHTLY
Status: DISCONTINUED | OUTPATIENT
Start: 2023-12-30 | End: 2024-01-06 | Stop reason: HOSPADM

## 2023-12-30 RX ADMIN — POLYETHYLENE GLYCOL 3350 17 G: 17 POWDER, FOR SOLUTION ORAL at 08:44

## 2023-12-30 RX ADMIN — SODIUM CHLORIDE, PRESERVATIVE FREE 10 ML: 5 INJECTION INTRAVENOUS at 08:32

## 2023-12-30 RX ADMIN — SODIUM CHLORIDE, PRESERVATIVE FREE 10 ML: 5 INJECTION INTRAVENOUS at 20:39

## 2023-12-30 RX ADMIN — CLOPIDOGREL BISULFATE 75 MG: 75 TABLET ORAL at 08:31

## 2023-12-30 RX ADMIN — ENOXAPARIN SODIUM 40 MG: 100 INJECTION SUBCUTANEOUS at 08:31

## 2023-12-30 RX ADMIN — RANOLAZINE 500 MG: 500 TABLET, FILM COATED, EXTENDED RELEASE ORAL at 20:39

## 2023-12-30 RX ADMIN — LISINOPRIL 20 MG: 20 TABLET ORAL at 12:09

## 2023-12-30 RX ADMIN — BUSPIRONE HYDROCHLORIDE 10 MG: 10 TABLET ORAL at 20:39

## 2023-12-30 RX ADMIN — HYDROCHLOROTHIAZIDE 25 MG: 25 TABLET ORAL at 12:09

## 2023-12-30 RX ADMIN — BUSPIRONE HYDROCHLORIDE 10 MG: 10 TABLET ORAL at 08:31

## 2023-12-30 RX ADMIN — ATORVASTATIN CALCIUM 40 MG: 40 TABLET, FILM COATED ORAL at 20:39

## 2023-12-30 RX ADMIN — BUSPIRONE HYDROCHLORIDE 10 MG: 10 TABLET ORAL at 14:30

## 2023-12-30 RX ADMIN — RANOLAZINE 500 MG: 500 TABLET, FILM COATED, EXTENDED RELEASE ORAL at 08:31

## 2023-12-30 ASSESSMENT — PAIN SCALES - GENERAL: PAINLEVEL_OUTOF10: 0

## 2023-12-30 NOTE — PROGRESS NOTES
Hospital Medicine Progress Note      Date of Admission: 12/28/2023  Hospital Day: 3    Chief Admission Complaint: Weakness     Subjective: Patient seen and examined this morning.  No acute events overnight.  Complains of left knee pain at this weakness.  Discussed results of MRI and echocardiogram        Assessment/Plan:    68-year-old female with past medical history of hypertension, anxiety, angina who presented to the emergency department with left-sided weakness.  Patient states she had a fall about 2 weeks ago and has had weakness on the left upper and lower extremities.       #Left sided weakness-stroke ruled out with negative MRI pain likely due to osteoarthritis/injury  #Hypertension-restart blood pressure medication  #Angina -metoprolol /Ranexa  #Anxiety-hydroxyzine  #Family history of MS     Plan  -Weakness is likely secondary to pain due to osteoarthritis/injury  -Check x-ray of the left knee  -CT head CTA head and neck CT perfusion no acute abnormalities  -MRI negative for stroke  -MRI reviewed with no acute intracranial abnormalities no signs of stroke.  Small microvascular disease.  -Echocardiogram reviewed normal LVEF negative bubble study  -Discontinue Plavix/statin  -No arrhythmia on telemetry  -PT/OT  -Likely need SNF placement      Physical Exam Performed:      General appearance:  No apparent distress  Respiratory:  Normal respiratory effort.   Cardiovascular:  Regular rate and rhythm.  Abdomen:  Soft, non-tender, non-distended.  Musculoskeletal:  No edema, left knee tenderness on palpation  Neurologic:  Non-focal  Psychiatric:   AA&Ox3, CNs II-XII grossly intact.  Left upper and lower extremity weakness 3 out of 5 left upper, 4 out of 5 left lower.  Right lower 4 out of 5 strength as well.  Diminished sensation on the left lower extremity.  Moves all limbs spontaneously  Psych: Appropriate mood and affect    /62   Pulse 73   Temp 97.5 °F (36.4 °C) (Oral)   Resp 18   Ht 1.626 m (5'

## 2023-12-30 NOTE — PROGRESS NOTES
Speech LAnguage Pathology Dysphagia EVALUATION/DISCHARGE    Patient: Yusra Fontenot (68 y.o. female)  Date: 12/29/2023  Primary Diagnosis: CVA (cerebrovascular accident due to intracerebral hemorrhage) (HCC) [I61.9]  Cerebrovascular accident (CVA), unspecified mechanism (HCC) [I63.9]       Precautions:  Fall Risk, General Precautions                  ASSESSMENT :  Based on the objective data described below, the patient presents with swallow within normal limits. Patient with negative CT and MRI and is currently on a regular diet with thin liquids. Nurse reports the patient has been doing well eating, drinking, and taking medication. Patient positioned upright, alert, Ox4 and agreeable to evaluation with spouse present. Patient seen eating her dinner of rice, meat, green beans, diced fruit and tea. Oral phase: grossly intact. Pharyngeal phase: grossly intact; good HLE via digital palpation, timely swallows and maintained dry vocal quality throughout meal.    Patient will be discharged from skilled speech-language pathology services at this time.     PLAN :  Recommendations and Planned Interventions:  Diet: Regular and thin liquids  No further skilled ST services are warranted at this time.     Acute SLP Services: No, patient will be discharged from acute skilled speech-language pathology at this time.    Discharge Recommendations: None     SUBJECTIVE:   I have been able to eat and drink without any issues.    OBJECTIVE:     Past Medical History:   Diagnosis Date    Anxiety state, unspecified     Asthma     as a child till age 12    Depression     Diverticulosis     GERD (gastroesophageal reflux disease)     Hypercholesterolemia     Hypertension     Palmoplantar keratoderma      Past Surgical History:   Procedure Laterality Date    COLONOSCOPY      COLONOSCOPY      due 2015    TOTAL HIP ARTHROPLASTY       Prior Level of Function/Home Situation:   Social/Functional History  Lives With: Spouse  Type of Home:

## 2023-12-31 PROCEDURE — 6370000000 HC RX 637 (ALT 250 FOR IP): Performed by: STUDENT IN AN ORGANIZED HEALTH CARE EDUCATION/TRAINING PROGRAM

## 2023-12-31 PROCEDURE — 1100000000 HC RM PRIVATE

## 2023-12-31 PROCEDURE — 97110 THERAPEUTIC EXERCISES: CPT

## 2023-12-31 PROCEDURE — 2580000003 HC RX 258: Performed by: STUDENT IN AN ORGANIZED HEALTH CARE EDUCATION/TRAINING PROGRAM

## 2023-12-31 PROCEDURE — 97116 GAIT TRAINING THERAPY: CPT

## 2023-12-31 PROCEDURE — 6360000002 HC RX W HCPCS: Performed by: STUDENT IN AN ORGANIZED HEALTH CARE EDUCATION/TRAINING PROGRAM

## 2023-12-31 RX ORDER — OXYCODONE HYDROCHLORIDE 5 MG/1
5 TABLET ORAL EVERY 6 HOURS PRN
Status: DISCONTINUED | OUTPATIENT
Start: 2023-12-31 | End: 2024-01-06 | Stop reason: HOSPADM

## 2023-12-31 RX ADMIN — LISINOPRIL 20 MG: 20 TABLET ORAL at 08:30

## 2023-12-31 RX ADMIN — SODIUM CHLORIDE, PRESERVATIVE FREE 10 ML: 5 INJECTION INTRAVENOUS at 20:42

## 2023-12-31 RX ADMIN — RANOLAZINE 500 MG: 500 TABLET, FILM COATED, EXTENDED RELEASE ORAL at 20:40

## 2023-12-31 RX ADMIN — SODIUM CHLORIDE, PRESERVATIVE FREE 10 ML: 5 INJECTION INTRAVENOUS at 08:31

## 2023-12-31 RX ADMIN — RANOLAZINE 500 MG: 500 TABLET, FILM COATED, EXTENDED RELEASE ORAL at 08:31

## 2023-12-31 RX ADMIN — BUSPIRONE HYDROCHLORIDE 10 MG: 10 TABLET ORAL at 13:47

## 2023-12-31 RX ADMIN — ATORVASTATIN CALCIUM 40 MG: 40 TABLET, FILM COATED ORAL at 20:40

## 2023-12-31 RX ADMIN — HYDROCHLOROTHIAZIDE 25 MG: 25 TABLET ORAL at 08:30

## 2023-12-31 RX ADMIN — BUSPIRONE HYDROCHLORIDE 10 MG: 10 TABLET ORAL at 08:30

## 2023-12-31 RX ADMIN — BUSPIRONE HYDROCHLORIDE 10 MG: 10 TABLET ORAL at 20:40

## 2023-12-31 RX ADMIN — ENOXAPARIN SODIUM 40 MG: 100 INJECTION SUBCUTANEOUS at 08:31

## 2023-12-31 RX ADMIN — OXYCODONE 5 MG: 5 TABLET ORAL at 11:05

## 2023-12-31 ASSESSMENT — PAIN SCALES - GENERAL
PAINLEVEL_OUTOF10: 6
PAINLEVEL_OUTOF10: 0
PAINLEVEL_OUTOF10: 4
PAINLEVEL_OUTOF10: 8

## 2023-12-31 ASSESSMENT — PAIN - FUNCTIONAL ASSESSMENT: PAIN_FUNCTIONAL_ASSESSMENT: PREVENTS OR INTERFERES SOME ACTIVE ACTIVITIES AND ADLS

## 2023-12-31 ASSESSMENT — PAIN SCALES - WONG BAKER
WONGBAKER_NUMERICALRESPONSE: 0
WONGBAKER_NUMERICALRESPONSE: 0

## 2023-12-31 ASSESSMENT — PAIN DESCRIPTION - LOCATION
LOCATION: LEG
LOCATION: LEG

## 2023-12-31 ASSESSMENT — PAIN DESCRIPTION - DESCRIPTORS: DESCRIPTORS: ACHING

## 2023-12-31 ASSESSMENT — PAIN DESCRIPTION - ORIENTATION
ORIENTATION: RIGHT;LEFT
ORIENTATION: LEFT

## 2023-12-31 NOTE — PROGRESS NOTES
Hospital Medicine Progress Note      Date of Admission: 12/28/2023  Hospital Day: 4    Chief Admission Complaint: Weakness     Subjective: Patient seen and examined this morning.  No acute events overnight.  States she is doing well.  Is having left knee aches and pains.  Denies any shortness of breath, chest pain, palpitation, abdominal pain, nausea, vomiting, fevers or chills.        Assessment/Plan:    68-year-old female with past medical history of hypertension, anxiety, angina who presented to the emergency department with left-sided weakness.  Patient states she had a fall about 2 weeks ago and has had weakness on the left upper and lower extremities.       #Left sided weakness-stroke ruled out with negative MRI pain likely due to osteoarthritis/injury  #Hypertension-restart blood pressure medication  #Angina -metoprolol /Ranexa  #Anxiety-hydroxyzine  #Family history of MS     Plan  -X-ray of the left knee reviewed no acute abnormalities.  -CT head CTA head and neck /CT perfusion no acute abnormalities  -MRI negative for stroke  -Echocardiogram reviewed normal LVEF negative bubble study  -Discontinue Plavix/statin  -No arrhythmia on telemetry  -As needed pain medications  -PT/OT  -SNF placement pending      Physical Exam Performed:      General appearance:  No apparent distress  Respiratory:  Normal respiratory effort.   Cardiovascular:  Regular rate and rhythm.  Abdomen:  Soft, non-tender, non-distended.  Musculoskeletal:  No edema, left knee tenderness on palpation  Neurologic:  Non-focal  Psychiatric:   AA&Ox3, CNs II-XII grossly intact.  Left upper and lower extremity weakness 3 out of 5 left upper, 4 out of 5 left lower.  Right lower 4 out of 5 strength as well.  Diminished sensation on the left lower extremity.  Moves all limbs spontaneously  Psych: Appropriate mood and affect    /65   Pulse 67   Temp 98.2 °F (36.8 °C) (Oral)   Resp 19   Ht 1.626 m (5' 4\")   Wt 80.3 kg (177 lb)   SpO2 97%

## 2023-12-31 NOTE — PLAN OF CARE
Problem: Physical Therapy - Adult  Goal: By Discharge: Performs mobility at highest level of function for planned discharge setting.  See evaluation for individualized goals.  Description: FUNCTIONAL STATUS PRIOR TO ADMISSION: Patient was modified independent using a rolling walker for functional mobility.    HOME SUPPORT PRIOR TO ADMISSION: The patient lived with  and required moderate assistance for ADLs recently since 2 weeks.    Physical Therapy Goals  Initiated 12/29/2023  Pt stated goal: get better  Pt will be I with LE HEP in 7 days.  Pt will perform bed mobility with Minimal Assist in 7 days.  Pt will perform transfers with Minimal Assist in 7 days.   Pt will amb 50 feet with LRAD safely with Minimal Assist in 7 days.     Pt will verbalize and demonstrate compliance with precautions  in 7 days.   Pt will demonstrate improvement in  standing/gait balance from poor/fair to good in 7 days.    Outcome: Progressing            PHYSICAL THERAPY TREATMENT     Patient: Yusra Fontenot (68 y.o. female)  Date: 12/31/2023  Diagnosis: CVA (cerebrovascular accident due to intracerebral hemorrhage) (HCC) [I61.9]  Cerebrovascular accident (CVA), unspecified mechanism (HCC) [I63.9] CVA (cerebrovascular accident due to intracerebral hemorrhage) (HCC)      Precautions: Fall Risk, General Precautions                Recommendations for nursing mobility: Out of bed to chair for meals, Encourage HEP in prep for ADLs/mobility; see handout for details, Frequent repositioning to prevent skin breakdown, and AD and gt belt for bed to chair     In place during session: External Catheter and EKG/telemetry   Chart, physical therapy assessment, plan of care and goals were reviewed.  ASSESSMENT  Patient continues with skilled PT services and is progressing towards goals. Pt received semi supine in bed,  present upon PTA arrival, agreeable to session. Pt A&O x 4. (See below for objective details and assist levels). Pt rolled  time  Sit to Supine: Stand-by assistance  Scooting: Minimum assistance;Additional time  Transfers:  Transfer Training  Interventions: Verbal cues;Weight shifting training/pressure relief;Safety awareness training;Manual cues  Sit to Stand: Minimum assistance;Additional time  Stand to Sit: Minimum assistance;Additional time  Balance:  Balance  Sitting: Impaired  Sitting - Static: Good (unsupported)  Sitting - Dynamic: Good (unsupported);Fair (occasional)  Standing: Impaired  Standing - Static: Constant support;Fair  Standing - Dynamic: Constant support;Fair  Ambulation/Gait Training:  Gait  Overall Level of Assistance: Moderate assistance;Minimum assistance  Distance (ft): 21 Feet  Assistive Device: Walker, rolling;Gait belt  Interventions: Weight shifting training/pressure relief;Verbal cues;Manual cues  Base of Support: Narrowed    Therapeutic Exercises:       EXERCISE   Sets   Reps   Active Active Assist   Passive Self ROM   Comments   Heel Raises 1 20 [x] [] [] []    Toe Raises 1 20 [x] [] [] []    Hamstring Sets   [] [] [] []    Short Arc Quads   [] [] [] []    Heel Slides   [] [] [] []    Straight Leg Raises   [] [] [] []    Hip abd/add 1 10 [x] [] [] []    Long Arc Quads 1 10 [x] [] [] []    Marching 1 10 [x] [] [] []       [] [] [] []       Pain Ratin/10 B hands and legs reported  Pain Intervention(s):   nursing notified and addressing and patient medicated for pain prior to session    Activity Tolerance:   Fair     After treatment patient left in no apparent distress:   Bed locked and returned to lowest position, Patient left in no apparent distress in bed, Call bell within reach, Caregiver / family present, and Side rails x3, and nsg updated     COMMUNICATION/COLLABORATION:   The patient’s plan of care was discussed with: Registered nurse    Patient Education  Education Given To: Patient;Family  Education Provided: Plan of Care;Home Exercise Program;Transfer Training;Fall Prevention

## 2023-12-31 NOTE — CARE COORDINATION
CM noted discharge order.  Patient has PT/OT recs for IRF.  CM met with patient at bedside.  Patient agreeable to IRF.  Choice letter signed for [facility].  Choice letter placed on chart and referral sent.    CM spoke with [facility] liaison.  He was going to see patient today.    Patient will need authorization to go to [facility].

## 2023-12-31 NOTE — PLAN OF CARE
Problem: Safety - Adult  Goal: Free from fall injury  Outcome: Progressing  Flowsheets (Taken 12/31/2023 0116)  Free From Fall Injury:   Instruct family/caregiver on patient safety   Based on caregiver fall risk screen, instruct family/caregiver to ask for assistance with transferring infant if caregiver noted to have fall risk factors

## 2024-01-01 PROCEDURE — 97110 THERAPEUTIC EXERCISES: CPT

## 2024-01-01 PROCEDURE — 6370000000 HC RX 637 (ALT 250 FOR IP): Performed by: STUDENT IN AN ORGANIZED HEALTH CARE EDUCATION/TRAINING PROGRAM

## 2024-01-01 PROCEDURE — 6360000002 HC RX W HCPCS: Performed by: STUDENT IN AN ORGANIZED HEALTH CARE EDUCATION/TRAINING PROGRAM

## 2024-01-01 PROCEDURE — 97116 GAIT TRAINING THERAPY: CPT

## 2024-01-01 PROCEDURE — 1100000000 HC RM PRIVATE

## 2024-01-01 PROCEDURE — 2580000003 HC RX 258: Performed by: STUDENT IN AN ORGANIZED HEALTH CARE EDUCATION/TRAINING PROGRAM

## 2024-01-01 PROCEDURE — 97530 THERAPEUTIC ACTIVITIES: CPT

## 2024-01-01 RX ORDER — ASPIRIN 81 MG/1
81 TABLET, CHEWABLE ORAL DAILY
Status: DISCONTINUED | OUTPATIENT
Start: 2024-01-01 | End: 2024-01-04

## 2024-01-01 RX ADMIN — RANOLAZINE 500 MG: 500 TABLET, FILM COATED, EXTENDED RELEASE ORAL at 08:22

## 2024-01-01 RX ADMIN — ATORVASTATIN CALCIUM 40 MG: 40 TABLET, FILM COATED ORAL at 20:01

## 2024-01-01 RX ADMIN — ENOXAPARIN SODIUM 40 MG: 100 INJECTION SUBCUTANEOUS at 08:23

## 2024-01-01 RX ADMIN — LISINOPRIL 20 MG: 20 TABLET ORAL at 08:22

## 2024-01-01 RX ADMIN — ONDANSETRON 4 MG: 4 TABLET, ORALLY DISINTEGRATING ORAL at 16:47

## 2024-01-01 RX ADMIN — SODIUM CHLORIDE, PRESERVATIVE FREE 10 ML: 5 INJECTION INTRAVENOUS at 20:02

## 2024-01-01 RX ADMIN — BUSPIRONE HYDROCHLORIDE 10 MG: 10 TABLET ORAL at 08:22

## 2024-01-01 RX ADMIN — ASPIRIN 81 MG: 81 TABLET, CHEWABLE ORAL at 09:53

## 2024-01-01 RX ADMIN — OXYCODONE 5 MG: 5 TABLET ORAL at 08:23

## 2024-01-01 RX ADMIN — SODIUM CHLORIDE, PRESERVATIVE FREE 10 ML: 5 INJECTION INTRAVENOUS at 08:23

## 2024-01-01 RX ADMIN — HYDROCHLOROTHIAZIDE 25 MG: 25 TABLET ORAL at 08:23

## 2024-01-01 RX ADMIN — RANOLAZINE 500 MG: 500 TABLET, FILM COATED, EXTENDED RELEASE ORAL at 20:01

## 2024-01-01 ASSESSMENT — PAIN DESCRIPTION - ORIENTATION: ORIENTATION: RIGHT;LEFT

## 2024-01-01 ASSESSMENT — PAIN DESCRIPTION - LOCATION: LOCATION: LEG

## 2024-01-01 ASSESSMENT — PAIN SCALES - GENERAL
PAINLEVEL_OUTOF10: 2
PAINLEVEL_OUTOF10: 7

## 2024-01-01 ASSESSMENT — PAIN DESCRIPTION - DESCRIPTORS: DESCRIPTORS: ACHING

## 2024-01-01 NOTE — PROGRESS NOTES
Hospital Medicine Progress Note      Date of Admission: 12/28/2023  Hospital Day: 5    Chief Admission Complaint: Weakness     Subjective: Patient seen examined this morning.  No acute overnight.  Resting in bed comfortably.  Did participate in physical therapy.  He does have bilateral generalized weakness.        Assessment/Plan:    68-year-old female with past medical history of hypertension, anxiety, angina who presented to the emergency department with left-sided weakness.  Patient states she had a fall about 2 weeks ago and has had weakness on the left upper and lower extremities.       # Bilateral lower extremity weakness-stroke ruled out with negative MRI pain likely due to osteoarthritis/injury,  #Hypertension-restart blood pressure medication  #Angina -metoprolol /Ranexa  #Anxiety-hydroxyzine  #Family history of MS     Plan  -Spinal etiologies considered for bilateral weakness but patient without any back pain.  Have discussed with patient if physical therapy does not work may consider imaging of the back to rule out spinal etiologies.  -X-ray of the left knee reviewed no acute abnormalities.  -CT head CTA head and neck /CT perfusion no acute abnormalities  -MRI negative for stroke  -Echocardiogram reviewed normal LVEF/ negative bubble study  -Continue aspirin and statin  -No arrhythmia on telemetry  -As needed pain medications  -PT/OT  -SNF placement pending-otherwise medically stable for discharge      Physical Exam Performed:      General appearance:  No apparent distress  Respiratory:  Normal respiratory effort.   Cardiovascular:  Regular rate and rhythm.  Abdomen:  Soft, non-tender, non-distended.  Musculoskeletal:  No edema, left knee tenderness on palpation  Neurologic:  Non-focal  Psychiatric:   AA&Ox3, CNs II-XII grossly intact.  Left upper and lower extremity weakness 3 out of 5 left upper, 4 out of 5 left lower.  Right lower 4 out of 5 strength as well.  Sensation normalized intact of both  sides moves all limbs spontaneously  Psych: Appropriate mood and affect    /83   Pulse 76   Temp 97.9 °F (36.6 °C) (Oral)   Resp 18   Ht 1.626 m (5' 4\")   Wt 80.3 kg (177 lb)   SpO2 98%   BMI 30.38 kg/m²     Diet: ADULT DIET; Regular; 3 carb choices (45 gm/meal); Low Fat/Low Chol/High Fiber/2 gm Na  DVT Prophylaxis: [x]PPx LMWH  []SQ Heparin  []IPC/SCDs  []Eliquis  []Xarelto  []Coumadin  []Other -      Code status: Full Code  PT/OT Eval Status:   []NOT yet ordered  [x]Ordered and Pending   []Seen with Recommendations for:  []Home independently  []Home w/ assist  []HHC  [x]SNF  []Acute Rehab    Anticipated Discharge Day/Date:  tbd    Anticipated Discharge Location: [x]Home  []HHC  []SNF  []Acute Rehab  []ECF  []LTAC  []Hospice  []Other -      Consults:      None        ------------------------------------------------------------------------------------------------------------------------------------------------------------------------    MDM      [] High (any 2)    A. Problems (any 1)  [] Acute/Chronic Illness/injury posing threat to life or bodily function:    [] Severe exacerbation of chronic illness:    ---------------------------------------------------------------------  B. Risk of Treatment (any 1)   [] Drugs/treatments that require intensive monitoring for toxicity include:    [] Change in code status:    [] Decision to escalate care:    [] Major surgery/procedure with associated risk factors:    ----------------------------------------------------------------------  C. Data (any 2)  [] Discussed current management and discharge planning options with Case Management.  [] Discussed management of the case with:    [] Telemetry personally reviewed and interpreted as documented above    [] Imaging personally reviewed and interpreted, includes:    [] Data Review (any 3)  [] Collateral history obtained from:    [x] All available Consultant notes from yesterday/today were reviewed  [x] All current labs

## 2024-01-01 NOTE — PLAN OF CARE
OCCUPATIONAL THERAPY TREATMENT  Patient: Yusra Fontenot (68 y.o. female)  Date: 1/1/2024  Primary Diagnosis: CVA (cerebrovascular accident due to intracerebral hemorrhage) (HCC) [I61.9]  Cerebrovascular accident (CVA), unspecified mechanism (HCC) [I63.9]       Precautions: Fall Risk, General Precautions                Recommendations for nursing mobility: Out of bed to chair for meals, Encourage HEP in prep for ADLs/mobility; see handout for details, Frequent repositioning to prevent skin breakdown, AD and gt belt for bed to chair , and Assist x1    In place during session: External Catheter and EKG/telemetry   Chart, occupational therapy assessment, plan of care, and goals were reviewed.    ASSESSMENT  Patient continues with skilled OT services and is progressing towards goals.  Pt received semi-supine in bed upon arrival, AXO x 4 and agreeable to BHATT tx at this time. See below for objective details and assist levels.     Pt cooperative and demonstrated good effort during activities.  Pt tolerated therapy session fairly with the completion of bed mobility, ADL'S, HEP and functional transfers with additional time d/t decreased L UE function and calvin LE function.  Pt noted with improved bed mobility<>EOB from min A to SBA with vc's for proper technique using bed rail.  Pt presented good sitting balance while performing grooming with vc's/education provided to use L UE for increased functional usage. Pt engaged in L UE exercises with education and cueing provided regarding joint protection/proper technique/achieve full ROM needed to improve movement to increase performance in ADL'S and functional tf's, see grid below. Pt completed STS using AD with CGA and vs for correct hand placement and side step towards HOB with min A with vc's for sequencing and RW mgmt for safety. Pt declined chair tf d/t feeling nauseas. Will continue to progress. Potential barriers for safe discharge pt is a high fall risk, pt is not safe

## 2024-01-01 NOTE — PLAN OF CARE
Problem: Pain  Goal: Verbalizes/displays adequate comfort level or baseline comfort level  Outcome: Progressing     Problem: Discharge Planning  Goal: Discharge to home or other facility with appropriate resources  Outcome: Progressing  Flowsheets (Taken 12/31/2023 1027 by Kelli Pedroza, RN)  Discharge to home or other facility with appropriate resources: Identify barriers to discharge with patient and caregiver     Problem: Safety - Adult  Goal: Free from fall injury  Outcome: Progressing  Flowsheets (Taken 12/31/2023 1029 by Kelli Pedroza, RN)  Free From Fall Injury: Instruct family/caregiver on patient safety     Problem: ABCDS Injury Assessment  Goal: Absence of physical injury  Outcome: Progressing     Problem: Physical Therapy - Adult  Goal: By Discharge: Performs mobility at highest level of function for planned discharge setting.  See evaluation for individualized goals.  Description: FUNCTIONAL STATUS PRIOR TO ADMISSION: Patient was modified independent using a rolling walker for functional mobility.    HOME SUPPORT PRIOR TO ADMISSION: The patient lived with  and required moderate assistance for ADLs recently since 2 weeks.    Physical Therapy Goals  Initiated 12/29/2023  Pt stated goal: get better  Pt will be I with LE HEP in 7 days.  Pt will perform bed mobility with Minimal Assist in 7 days.  Pt will perform transfers with Minimal Assist in 7 days.   Pt will amb 50 feet with LRAD safely with Minimal Assist in 7 days.     Pt will verbalize and demonstrate compliance with precautions  in 7 days.   Pt will demonstrate improvement in  standing/gait balance from poor/fair to good in 7 days.    12/31/2023 1540 by Mana Queen PTA  Outcome: Progressing     Problem: Chronic Conditions and Co-morbidities  Goal: Patient's chronic conditions and co-morbidity symptoms are monitored and maintained or improved  Outcome: Progressing  Flowsheets (Taken 12/31/2023 1027 by Kelli Pedroza, RN)  Care Plan -  Patient's Chronic Conditions and Co-Morbidity Symptoms are Monitored and Maintained or Improved: Monitor and assess patient's chronic conditions and comorbid symptoms for stability, deterioration, or improvement     Problem: Skin/Tissue Integrity  Goal: Absence of new skin breakdown  Description: 1.  Monitor for areas of redness and/or skin breakdown  2.  Assess vascular access sites hourly  3.  Every 4-6 hours minimum:  Change oxygen saturation probe site  4.  Every 4-6 hours:  If on nasal continuous positive airway pressure, respiratory therapy assess nares and determine need for appliance change or resting period.  Outcome: Progressing

## 2024-01-01 NOTE — CARE COORDINATION
CM noted discharge order.  Yesterday CM sent referral to [facility].  Patient has been accepted, but will need authorization.    CM has reached out to [facility] to determine when authorization will be started.      CM will continue to follow up.

## 2024-01-01 NOTE — PLAN OF CARE
Problem: Pain  Goal: Verbalizes/displays adequate comfort level or baseline comfort level  1/1/2024 1044 by Randa Cardenas RN  Outcome: Progressing  12/31/2023 2210 by Momo Patel RN  Outcome: Progressing     Problem: Discharge Planning  Goal: Discharge to home or other facility with appropriate resources  1/1/2024 1044 by Randa Cardenas RN  Outcome: Progressing  Flowsheets (Taken 1/1/2024 0755)  Discharge to home or other facility with appropriate resources: Identify barriers to discharge with patient and caregiver  12/31/2023 2210 by Momo Patel RN  Outcome: Progressing  Flowsheets (Taken 12/31/2023 1027 by Kelli Pedroza, RN)  Discharge to home or other facility with appropriate resources: Identify barriers to discharge with patient and caregiver     Problem: Safety - Adult  Goal: Free from fall injury  1/1/2024 1044 by Randa Cardenas RN  Outcome: Progressing  Flowsheets (Taken 1/1/2024 0800)  Free From Fall Injury: Instruct family/caregiver on patient safety  12/31/2023 2210 by Momo Patel RN  Outcome: Progressing  Flowsheets (Taken 12/31/2023 1029 by Kelli Pedroza RN)  Free From Fall Injury: Instruct family/caregiver on patient safety     Problem: ABCDS Injury Assessment  Goal: Absence of physical injury  1/1/2024 1044 by Randa Cardenas RN  Outcome: Progressing  Flowsheets (Taken 1/1/2024 0800)  Absence of Physical Injury: Implement safety measures based on patient assessment  12/31/2023 2210 by Momo Patel RN  Outcome: Progressing     Problem: Chronic Conditions and Co-morbidities  Goal: Patient's chronic conditions and co-morbidity symptoms are monitored and maintained or improved  1/1/2024 1044 by Randa Cardenas RN  Outcome: Progressing  Flowsheets (Taken 1/1/2024 0755)  Care Plan - Patient's Chronic Conditions and Co-Morbidity Symptoms are Monitored and Maintained or Improved: Monitor and assess patient's chronic conditions and comorbid symptoms for stability, deterioration, or

## 2024-01-01 NOTE — PLAN OF CARE
PHYSICAL THERAPY TREATMENT     Patient: Yursa Fontenot (68 y.o. female)  Date: 1/1/2024  Diagnosis: CVA (cerebrovascular accident due to intracerebral hemorrhage) (HCC) [I61.9]  Cerebrovascular accident (CVA), unspecified mechanism (HCC) [I63.9] CVA (cerebrovascular accident due to intracerebral hemorrhage) (HCC)      Precautions:  Fall Risk, General Precautions                Recommendations for nursing mobility: Out of bed to chair for meals, Use of BSC for toileting , and AD and gt belt for bed to chair     In place during session:   Chart, physical therapy assessment, plan of care and goals were reviewed.  ASSESSMENT  Patient continues with skilled PT services and is progressing towards goals. Pt supine upon PT arrival, agreeable to session. Pt A&O x 4. Motivated for participation with PT. (See below for objective details and assist levels).     Overall pt tolerated session well today with PT. Remains SBA for bed mobility. Progressed to CGA for sit<>stand/chair transfers. Increased amb distance slightly. Pt with hesitant steps. Noted instability in BLE, verbalized feeling of buckling in BLE (More so in the LLE). No overt LOB noted, slow cautious steps taken. Participated with seated therex, tolerated added exercises well. Reviewed HEP, pt verbalized understanding and compliance. Will continue to benefit from skilled PT services, and will continue to progress as tolerated. Potential barriers for safe discharge: pt is a high fall risk, pt is not safe to be alone, and concern for pt safely navigating or managing the home environment. Current PT DC recommendation Inpatient Rehabilitation Facility  once medically appropriate.      GOALS:    Problem: Physical Therapy - Adult  Goal: By Discharge: Performs mobility at highest level of function for planned discharge setting.  See evaluation for individualized goals.  Description: FUNCTIONAL STATUS PRIOR TO ADMISSION: Patient was modified independent using a  Mobility:     Ambulation/Gait Training:                                Gait  Overall Level of Assistance: Contact-guard assistance  Distance (ft): 25 Feet  Assistive Device: Gait belt;Walker, rolling  Interventions: Weight shifting training/pressure relief;Verbal cues  Base of Support: Narrowed  Step Length: Left shortened;Right shortened                     Therapeutic Exercises:       EXERCISE   Sets   Reps   Active Active Assist   Passive Self ROM   Comments   Ankle Pumps 1 10 [x] [] [] []    Quad Sets/Glut Sets   [] [] [] []    Hamstring Sets   [] [] [] []    Short Arc Quads   [] [] [] []    Heel Slides 1 10 [x] [] [] []    Straight Leg Raises 1 10 [x] [] [] []    Hip abd/add   [] [] [] []    Long Arc Quads 1 10 [x] [] [] []    Marching   [] [] [] []       [] [] [] []       Pain Ratin/10 L thigh (cramping feeling) reported  Pain Intervention(s):       Activity Tolerance:   Fair  After treatment patient left in no apparent distress:   Bed locked and returned to lowest position, Patient left in no apparent distress sitting up in chair and Call bell within reach, and nsg updated     COMMUNICATION/COLLABORATION:   The patient’s plan of care was discussed with: Registered nurse    Patient Education  Education Given To: Patient  Education Provided: Role of Therapy;Home Exercise Program;Plan of Care;Transfer Training;Fall Prevention Strategies  Education Method: Demonstration;Verbal  Barriers to Learning: None  Education Outcome: Verbalized understanding;Demonstrated understanding;Continued education needed        Colleen Moran PTA  Minutes: 35

## 2024-01-02 PROCEDURE — 97530 THERAPEUTIC ACTIVITIES: CPT

## 2024-01-02 PROCEDURE — 2580000003 HC RX 258: Performed by: STUDENT IN AN ORGANIZED HEALTH CARE EDUCATION/TRAINING PROGRAM

## 2024-01-02 PROCEDURE — 6370000000 HC RX 637 (ALT 250 FOR IP): Performed by: STUDENT IN AN ORGANIZED HEALTH CARE EDUCATION/TRAINING PROGRAM

## 2024-01-02 PROCEDURE — 1100000000 HC RM PRIVATE

## 2024-01-02 PROCEDURE — 6360000002 HC RX W HCPCS: Performed by: STUDENT IN AN ORGANIZED HEALTH CARE EDUCATION/TRAINING PROGRAM

## 2024-01-02 RX ADMIN — SODIUM CHLORIDE, PRESERVATIVE FREE 10 ML: 5 INJECTION INTRAVENOUS at 19:39

## 2024-01-02 RX ADMIN — ENOXAPARIN SODIUM 40 MG: 100 INJECTION SUBCUTANEOUS at 09:12

## 2024-01-02 RX ADMIN — ASPIRIN 81 MG: 81 TABLET, CHEWABLE ORAL at 09:11

## 2024-01-02 RX ADMIN — RANOLAZINE 500 MG: 500 TABLET, FILM COATED, EXTENDED RELEASE ORAL at 09:11

## 2024-01-02 RX ADMIN — RANOLAZINE 500 MG: 500 TABLET, FILM COATED, EXTENDED RELEASE ORAL at 19:39

## 2024-01-02 RX ADMIN — ATORVASTATIN CALCIUM 40 MG: 40 TABLET, FILM COATED ORAL at 19:39

## 2024-01-02 RX ADMIN — LISINOPRIL 20 MG: 20 TABLET ORAL at 09:11

## 2024-01-02 RX ADMIN — HYDROCHLOROTHIAZIDE 25 MG: 25 TABLET ORAL at 09:11

## 2024-01-02 RX ADMIN — SODIUM CHLORIDE, PRESERVATIVE FREE 10 ML: 5 INJECTION INTRAVENOUS at 09:15

## 2024-01-02 NOTE — PLAN OF CARE
mobility.    HOME SUPPORT PRIOR TO ADMISSION: The patient lived with  and required moderate assistance for ADLs recently since 2 weeks.    Physical Therapy Goals  Initiated 12/29/2023  Pt stated goal: get better  Pt will be I with LE HEP in 7 days.  Pt will perform bed mobility with Minimal Assist in 7 days.  Pt will perform transfers with Minimal Assist in 7 days.   Pt will amb 50 feet with LRAD safely with Minimal Assist in 7 days.     Pt will verbalize and demonstrate compliance with precautions  in 7 days.   Pt will demonstrate improvement in  standing/gait balance from poor/fair to good in 7 days.    Outcome: Progressing     PLAN :  Patient continues to benefit from skilled intervention to address functional impairments. Continue treatment per established plan of care to address goals.    Recommendation for discharge: (in order for the patient to meet his/her long term goals)  Inpatient Rehabilitation Facility     IF patient discharges home will need the following DME: TBD     SUBJECTIVE:   Patient stated “This leg just feels tight.”    OBJECTIVE DATA SUMMARY:   Critical Behavior:  Orientation  Overall Orientation Status: Within Normal Limits  Orientation Level: Oriented X4  Cognition  Overall Cognitive Status: WNL    Functional Mobility Training:  Bed Mobility:  Bed Mobility Training  Rolling: Stand-by assistance  Supine to Sit: Stand-by assistance  Sit to Supine: Stand-by assistance  Scooting: Stand-by assistance  Transfers:  Transfer Training  Transfer Training: Yes  Sit to Stand: Contact-guard assistance;Stand-by assistance  Stand to Sit: Contact-guard assistance;Stand-by assistance  Bed to Chair: Contact-guard assistance;Additional time  Toilet Transfer: Contact-guard assistance;Additional time  Balance:  Balance  Sitting: Intact  Sitting - Static: Good (unsupported)  Sitting - Dynamic: Good (unsupported)  Standing: Impaired  Standing - Static: Constant support;Fair  Standing - Dynamic: Constant

## 2024-01-02 NOTE — PLAN OF CARE
OCCUPATIONAL THERAPY TREATMENT  Patient: Yusra Fontenot (68 y.o. female)  Date: 1/2/2024  Primary Diagnosis: CVA (cerebrovascular accident due to intracerebral hemorrhage) (HCC) [I61.9]  Cerebrovascular accident (CVA), unspecified mechanism (HCC) [I63.9]       Precautions: Fall Risk, General Precautions                Recommendations for nursing mobility: Out of bed to chair for meals, Encourage HEP in prep for ADLs/mobility; see handout for details, AD and gt belt for bed to chair , Amb to bathroom with AD and gait belt, and Assist x1    In place during session: External Catheter and EKG/telemetry   Chart, occupational therapy assessment, plan of care, and goals were reviewed.  ASSESSMENT  Patient continues with skilled OT services and is progressing towards goals. Pt semi supine in bed upon OT arrival, agreeable to session. Pt A&O x 4. (See below for objective details and assist levels).     Overall pt tolerated session fair today with completion of bed mobility, transfers, and ADLs.  Donning of B socks completed in semi supine with total A, pt unable to complete at this time.  Pt completed bed mobility with SBA and additional time.  Pt able to complete transfer from EOB with CGA and additional time using RW for balance upon standing.  Pt ambulated to bathroom with Cade using RW and cueing for WBOS and upright standing posture.  Pt completed transfer on and off of toilet with CGA/Cade and able to complete seated bladder hygiene with setup A.  Pt ambulated to sink in bathroom and completed standing hand hygiene with Cade for balance.  Pt ambulated around room with CGA/Cade in preparation for household mobility and completed transfer to recliner.  Pt completed simulated hair brushing holding comb with SBA.  Pt completed some UE therex using blue foam block given (see chart below for details).  Pt left seated in recliner with call bell within reach and needs met.  Potential barriers for safe discharge: pt is a    Patient stated “I am proud of myself for doing that much.”    OBJECTIVE DATA SUMMARY:   Cognitive/Behavioral Status:  Orientation  Overall Orientation Status: Within Normal Limits  Orientation Level: Oriented X4  Cognition  Overall Cognitive Status: WNL    Functional Mobility and Transfers for ADLs:  Bed Mobility:  Bed Mobility Training  Rolling: Stand-by assistance;Additional time  Supine to Sit: Stand-by assistance;Additional time  Scooting: Stand-by assistance;Additional time    Transfers:  Transfer Training  Sit to Stand: Contact-guard assistance;Additional time  Stand to Sit: Contact-guard assistance;Additional time  Bed to Chair: Contact-guard assistance;Additional time  Toilet Transfer: Contact-guard assistance;Additional time    Balance:  Balance  Sitting: Intact  Sitting - Static: Good (unsupported)  Sitting - Dynamic: Good (unsupported)  Standing: Impaired  Standing - Static: Constant support;Fair  Standing - Dynamic: Constant support;Fair    ADL Intervention:  Grooming: Stand by assistance;Minimal assistance   Grooming Skilled Clinical Factors: seated hair brushing simulated with SBA; standing hand hygiene with Cade for balance    LE Dressing: Dependent/Total  LE Dressing Skilled Clinical Factors: semi supine donning of B shoes, unable to complete    Toileting: Setup  Toileting Skilled Clinical Factors: seated bladder hygiene while on toilet    Therapeutic exercise:  Exercise Sets Reps AROM AAROM PROM Self PROM Comments   Digit flex/ext 1 10 [x]  Blue foam block [] [] []      Pain Ratin/10     Activity Tolerance:   Fair  and requires rest breaks    After treatment patient left in no apparent distress:   Bed locked and returned to lowest position, Patient left in no apparent distress sitting up in chair and Call bell within reach, and nsg updated     COMMUNICATION/EDUCATION:   The patient’s plan of care was discussed with: Physical therapy assistant and Registered nurse    Patient

## 2024-01-02 NOTE — PROGRESS NOTES
Hospitalist Progress Note               Daily Progress Note: 1/2/2024      Chief complaint:   Chief Complaint   Patient presents with    Extremity Weakness        Subjective:   Hospital course to date:    68-year-old female with past medical history of hypertension, anxiety, angina who presented to the emergency department with left-sided weakness.  Patient states she had a fall about 2 weeks previous and  had weakness on the left upper and lower extremities.  She also states she did have some right-sided lower extremity weakness.  States that she thought this was due to her fall and pain associated with weakness.  She went to her physical therapist and was told her weakness was not consistent pain related.  She was sent to her primary care physician after physical exam patient was sent to the emergency department.     In the ED, head CT was negative.  She was admitted for further workup.    MRI was negative for stroke    PT and OT recommending IRF  --------  Patient is seen today for follow-up.  She is awake and alert.  No problems today        Medications reviewed  Current Facility-Administered Medications   Medication Dose Route Frequency    aspirin chewable tablet 81 mg  81 mg Oral Daily    oxyCODONE (ROXICODONE) immediate release tablet 5 mg  5 mg Oral Q6H PRN    atorvastatin (LIPITOR) tablet 40 mg  40 mg Oral Nightly    lisinopril (PRINIVIL;ZESTRIL) tablet 20 mg  20 mg Oral Daily    hydroCHLOROthiazide (HYDRODIURIL) tablet 25 mg  25 mg Oral Daily    ranolazine (RANEXA) extended release tablet 500 mg  500 mg Oral BID    hydrOXYzine pamoate (VISTARIL) capsule 25 mg  25 mg Oral TID PRN    sodium chloride flush 0.9 % injection 5-40 mL  5-40 mL IntraVENous 2 times per day    sodium chloride flush 0.9 % injection 5-40 mL  5-40 mL IntraVENous PRN    0.9 % sodium chloride infusion   IntraVENous PRN    ondansetron (ZOFRAN-ODT) disintegrating tablet 4 mg  4 mg Oral Q8H PRN    Or    ondansetron (ZOFRAN) injection 4

## 2024-01-03 ENCOUNTER — APPOINTMENT (OUTPATIENT)
Facility: HOSPITAL | Age: 69
DRG: 552 | End: 2024-01-03
Payer: MEDICARE

## 2024-01-03 PROCEDURE — 6360000002 HC RX W HCPCS: Performed by: STUDENT IN AN ORGANIZED HEALTH CARE EDUCATION/TRAINING PROGRAM

## 2024-01-03 PROCEDURE — 6370000000 HC RX 637 (ALT 250 FOR IP): Performed by: STUDENT IN AN ORGANIZED HEALTH CARE EDUCATION/TRAINING PROGRAM

## 2024-01-03 PROCEDURE — 1100000000 HC RM PRIVATE

## 2024-01-03 PROCEDURE — 97530 THERAPEUTIC ACTIVITIES: CPT

## 2024-01-03 PROCEDURE — 6370000000 HC RX 637 (ALT 250 FOR IP): Performed by: INTERNAL MEDICINE

## 2024-01-03 PROCEDURE — A9577 INJ MULTIHANCE: HCPCS | Performed by: INTERNAL MEDICINE

## 2024-01-03 PROCEDURE — 72156 MRI NECK SPINE W/O & W/DYE: CPT

## 2024-01-03 PROCEDURE — 2580000003 HC RX 258: Performed by: STUDENT IN AN ORGANIZED HEALTH CARE EDUCATION/TRAINING PROGRAM

## 2024-01-03 PROCEDURE — 6360000004 HC RX CONTRAST MEDICATION: Performed by: INTERNAL MEDICINE

## 2024-01-03 RX ORDER — LORAZEPAM 1 MG/1
1 TABLET ORAL ONCE
Status: COMPLETED | OUTPATIENT
Start: 2024-01-03 | End: 2024-01-03

## 2024-01-03 RX ADMIN — ATORVASTATIN CALCIUM 40 MG: 40 TABLET, FILM COATED ORAL at 20:42

## 2024-01-03 RX ADMIN — HYDROCHLOROTHIAZIDE 25 MG: 25 TABLET ORAL at 10:18

## 2024-01-03 RX ADMIN — ASPIRIN 81 MG: 81 TABLET, CHEWABLE ORAL at 10:18

## 2024-01-03 RX ADMIN — GADOBENATE DIMEGLUMINE 16 ML: 529 INJECTION, SOLUTION INTRAVENOUS at 17:02

## 2024-01-03 RX ADMIN — LORAZEPAM 1 MG: 1 TABLET ORAL at 15:47

## 2024-01-03 RX ADMIN — POLYETHYLENE GLYCOL 3350 17 G: 17 POWDER, FOR SOLUTION ORAL at 10:28

## 2024-01-03 RX ADMIN — LISINOPRIL 20 MG: 20 TABLET ORAL at 10:18

## 2024-01-03 RX ADMIN — RANOLAZINE 500 MG: 500 TABLET, FILM COATED, EXTENDED RELEASE ORAL at 20:42

## 2024-01-03 RX ADMIN — RANOLAZINE 500 MG: 500 TABLET, FILM COATED, EXTENDED RELEASE ORAL at 10:18

## 2024-01-03 RX ADMIN — SODIUM CHLORIDE, PRESERVATIVE FREE 10 ML: 5 INJECTION INTRAVENOUS at 20:42

## 2024-01-03 RX ADMIN — ENOXAPARIN SODIUM 40 MG: 100 INJECTION SUBCUTANEOUS at 10:18

## 2024-01-03 NOTE — PLAN OF CARE
OCCUPATIONAL THERAPY TREATMENT  Patient: Yusra Fontenot (68 y.o. female)  Date: 1/3/2024  Primary Diagnosis: CVA (cerebrovascular accident due to intracerebral hemorrhage) (HCC) [I61.9]  Cerebrovascular accident (CVA), unspecified mechanism (HCC) [I63.9]       Precautions: Fall Risk, General Precautions                Recommendations for nursing mobility: Out of bed to chair for meals, Encourage HEP in prep for ADLs/mobility; see handout for details, AD and gt belt for bed to chair , Amb to bathroom with AD and gait belt, and Assist x1    In place during session: EKG/telemetry   Chart, occupational therapy assessment, plan of care, and goals were reviewed.  ASSESSMENT  Patient continues with skilled OT services and is progressing towards goals. Pt seated in recliner upon OT arrival, agreeable to session. Pt A&O x 4. (See below for objective details and assist levels).     Overall pt tolerated session fair today with completion of transfers and ADLs.  Pt completed transfers with Min/ModA from low chair and able to complete ambulation in room with Cade using RW in preparation for household mobility.  Pt required cueing on WBOS and sequencing.  Pt completed standing oral hygiene and hair brushing with Cade and increased time for completion.  Pt required assist with balance and thoroughness due to limited ROM and decreased balance.  Pt ambulated to recliner in room and completed UE therex using foam block and reviewed UE HEP (see chart below for details).  Pt left seated in recliner with call bell within reach and needs met. Potential barriers for safe discharge: pt is a high fall risk, pt is not safe to be alone, and concern for pt safely navigating or managing the home environment. Current OT recommendations for discharge [facility]patient [facility] Facility . Will continue to benefit from skilled OT services, and will continue to progress as tolerated.     GOALS:  Problem: Occupational Therapy - Adult  Goal: By

## 2024-01-03 NOTE — PROGRESS NOTES
4 Eyes Skin Assessment     NAME:  Yusra Fontenot  YOB: 1955  MEDICAL RECORD NUMBER:  [id number]    The patient is being assessed for  Other Per Unit protocol     I agree that at least one RN has performed a thorough Head to Toe Skin Assessment on the patient. ALL assessment sites listed below have been assessed.      Areas assessed by both nurses:    Head, Face, Ears, Shoulders, Back, Chest, Arms, Elbows, Hands, Sacrum. Buttock, Coccyx, Ischium, Legs. Feet and Heels, and Under Medical Devices         Does the Patient have a Wound? No noted wound(s)       Luis Prevention initiated by RN: Yes  Wound Care Orders initiated by RN: No    Pressure Injury (Stage 3,4, Unstageable, DTI, NWPT, and Complex wounds) if present, place Wound referral order by RN under : No    New Ostomies, if present place, Ostomy referral order under : No     Nurse 1 eSignature: Electronically signed by Laura Fischer RN on 1/3/24 at 3:12 PM EST    **SHARE this note so that the co-signing nurse can place an eSignature**    Nurse 2 eSignature: Electronically signed by Ceasar Croft RN on 1/3/24 at 4:35 PM EST

## 2024-01-03 NOTE — PROGRESS NOTES
Hospitalist Progress Note               Daily Progress Note: 1/3/2024      Chief complaint:   Chief Complaint   Patient presents with    Extremity Weakness        Subjective:   Hospital course to date:    68-year-old female with past medical history of hypertension, anxiety, angina who presented to the emergency department with left-sided weakness.  Patient states she had a fall about 2 weeks previous and  had weakness on the left upper and lower extremities.  She also states she did have some right-sided lower extremity weakness.  States that she thought this was due to her fall and pain associated with weakness.  She went to her physical therapist and was told her weakness was not consistent pain related.  She was sent to her primary care physician after physical exam patient was sent to the emergency department.      In the ED, head CT was negative.  She was admitted for further workup.     MRI was negative for stroke     PT and OT recommending IRF    --------  Patient is seen today for follow-up. She is awake and alert. She still reports difficulty ambulating and mild lower extremity pain. She otherwise reports no other complaints. Pending authorization for [facility].    She continues to complain of numbness and weakness in the left hand but also has symptoms of tingling and numbness in her right hand as well.  The bilateral lower extremity weakness which is mild, also continues            Medications reviewed  Current Facility-Administered Medications   Medication Dose Route Frequency    aspirin chewable tablet 81 mg  81 mg Oral Daily    oxyCODONE (ROXICODONE) immediate release tablet 5 mg  5 mg Oral Q6H PRN    atorvastatin (LIPITOR) tablet 40 mg  40 mg Oral Nightly    lisinopril (PRINIVIL;ZESTRIL) tablet 20 mg  20 mg Oral Daily    hydroCHLOROthiazide (HYDRODIURIL) tablet 25 mg  25 mg Oral Daily    ranolazine (RANEXA) extended release tablet 500 mg  500 mg Oral BID    hydrOXYzine pamoate (VISTARIL) capsule  hours.  No results for input(s): \"PHART\", \"HEV3QBC\", \"PO2ART\", \"RJR3HSQ\", \"BEART\", \"BDM3RYFR\", \"HGBART\", \"YX2ONUFGA\", \"FIO2A\", \"L5FZANGU\", \"OXYHEM\", \"CARBOXHGBART\", \"METHGBART\", \"V3FRNZTUS\", \"PHCORART\", \"TEMP\" in the last 72 hours.    Invalid input(s): \"REY2WGCXI\"    24 Hour Results:  No results found for this or any previous visit (from the past 24 hour(s)).    XR KNEE LEFT (3 VIEWS)   Final Result   No acute abnormality.      MRI brain with and without contrast   Final Result      1. No acute intracranial abnormality. Minimal chronic microvascular ischemic   disease.         CT HEAD WO CONTRAST   Final Result   No acute process on noncontrast CT.            CTA HEAD NECK W CONTRAST   Final Result   1.  No large vessel occlusion or significant flow-limiting stenosis.   2.  No evidence of perfusion defects.          CT BRAIN PERFUSION   Final Result   1.  No large vessel occlusion or significant flow-limiting stenosis.   2.  No evidence of perfusion defects.          XR CHEST PORTABLE   Final Result      No acute process on portable chest.                Discussion/MDM:   Patient with multiple medical comorbidities, each with high likelihood for morbidity and mortality if left untreated.   I have reviewed patient's presenting subjective and objective findings, as well as all laboratory studies, imaging studies, and vital signs to date as well as treatment rendered and patient's response to those treatments.      In addition, prior medical, surgical and relevant social and family histories were reviewed. I have discussed management plan with patient/family and with nursing staff.     I personally reviewed labs: all labs past 24 hours  I personally reviewed imaging/EKG:  Toxic drug monitoring:   Discussed case with:         Assessment:    Bilateral lower extremity and generalized weakness.  Patient's bilateral upper extremity weakness and paresthesias raises some concern about cervical myelopathy.  Feel imaging of the

## 2024-01-03 NOTE — PROGRESS NOTES
Hospitalist Progress Note               Daily Progress Note: 1/3/2024      Chief complaint:   Chief Complaint   Patient presents with    Extremity Weakness        Subjective:   Hospital course to date:    68-year-old female with past medical history of hypertension, anxiety, angina who presented to the emergency department with left-sided weakness.  Patient states she had a fall about 2 weeks previous and  had weakness on the left upper and lower extremities.  She also states she did have some right-sided lower extremity weakness.  States that she thought this was due to her fall and pain associated with weakness.  She went to her physical therapist and was told her weakness was not consistent pain related.  She was sent to her primary care physician after physical exam patient was sent to the emergency department.      In the ED, head CT was negative.  She was admitted for further workup.     MRI was negative for stroke     PT and OT recommending IRF    --------  Patient is seen today for follow-up. She is awake and alert. She still reports difficulty ambulating and mild lower extremity pain. She otherwise reports no other complaints. Pending authorization for [facility].        Medications reviewed  Current Facility-Administered Medications   Medication Dose Route Frequency    aspirin chewable tablet 81 mg  81 mg Oral Daily    oxyCODONE (ROXICODONE) immediate release tablet 5 mg  5 mg Oral Q6H PRN    atorvastatin (LIPITOR) tablet 40 mg  40 mg Oral Nightly    lisinopril (PRINIVIL;ZESTRIL) tablet 20 mg  20 mg Oral Daily    hydroCHLOROthiazide (HYDRODIURIL) tablet 25 mg  25 mg Oral Daily    ranolazine (RANEXA) extended release tablet 500 mg  500 mg Oral BID    hydrOXYzine pamoate (VISTARIL) capsule 25 mg  25 mg Oral TID PRN    sodium chloride flush 0.9 % injection 5-40 mL  5-40 mL IntraVENous 2 times per day    sodium chloride flush 0.9 % injection 5-40 mL  5-40 mL IntraVENous PRN    0.9 % sodium chloride infusion

## 2024-01-03 NOTE — CARE COORDINATION
DC Plan: [facility] (auth pending)     Auth ref#  [id number]    Cm met with pt at the beside and gave her an update on [facility]. Cm discussed having a back up plan in case insurance does not approve IRF level of care. Cm discussed and educated SNF vs HH. SNF list was provided. Pt indicated home health would be the back up plan. Choice obtained for a home health company in network with her insurance. Referral made via [employer].

## 2024-01-03 NOTE — PLAN OF CARE
individualized goals.  Description: FUNCTIONAL STATUS PRIOR TO ADMISSION: Patient was modified independent using a rolling walker for functional mobility.    HOME SUPPORT PRIOR TO ADMISSION: The patient lived with  and required moderate assistance for ADLs recently since 2 weeks.    Physical Therapy Goals  Initiated 12/29/2023  Pt stated goal: get better  Pt will be I with LE HEP in 7 days.  Pt will perform bed mobility with Minimal Assist in 7 days.  Pt will perform transfers with Minimal Assist in 7 days.   Pt will amb 50 feet with LRAD safely with Minimal Assist in 7 days.     Pt will verbalize and demonstrate compliance with precautions  in 7 days.   Pt will demonstrate improvement in  standing/gait balance from poor/fair to good in 7 days.    Outcome: Progressing          PLAN :  Patient continues to benefit from skilled intervention to address functional impairments. Continue treatment per established plan of care to address goals.    Recommendation for discharge: (in order for the patient to meet his/her long term goals)  Inpatient Rehabilitation Facility     IF patient discharges home will need the following DME:continuing to assess with progress     SUBJECTIVE:   Patient stated “do you see my knee buckling?”    OBJECTIVE DATA SUMMARY:   Critical Behavior:  Orientation  Orientation Level: Oriented X4  Cognition  Overall Cognitive Status: WNL    Functional Mobility Training:  Bed Mobility:     Transfers:  Transfer Training  Sit to Stand: Minimum assistance;Assist X1;Additional time  Stand to Sit: Minimum assistance;Assist X1  Balance:  Balance  Sitting: Intact  Sitting - Static: Good (unsupported)  Sitting - Dynamic: Good (unsupported)  Standing: Impaired  Standing - Static: Constant support;Fair  Standing - Dynamic: Constant support;Fair  Wheelchair Mobility:     Ambulation/Gait Training:     Gait Training: Yes                          Gait  Distance (ft): 80 Feet  Assistive Device: Gait belt;Walker,

## 2024-01-04 LAB
ANION GAP SERPL CALC-SCNC: 7 MMOL/L (ref 5–15)
BUN SERPL-MCNC: 27 MG/DL (ref 6–20)
BUN/CREAT SERPL: 19 (ref 12–20)
CA-I BLD-MCNC: 9.4 MG/DL (ref 8.5–10.1)
CHLORIDE SERPL-SCNC: 100 MMOL/L (ref 97–108)
CO2 SERPL-SCNC: 24 MMOL/L (ref 21–32)
CREAT SERPL-MCNC: 1.44 MG/DL (ref 0.55–1.02)
ERYTHROCYTE [DISTWIDTH] IN BLOOD BY AUTOMATED COUNT: 14.5 % (ref 11.5–14.5)
GLUCOSE SERPL-MCNC: 100 MG/DL (ref 65–100)
HCT VFR BLD AUTO: 44 % (ref 35–47)
HGB BLD-MCNC: 14.5 G/DL (ref 11.5–16)
MCH RBC QN AUTO: 26.7 PG (ref 26–34)
MCHC RBC AUTO-ENTMCNC: 33 G/DL (ref 30–36.5)
MCV RBC AUTO: 81 FL (ref 80–99)
NRBC # BLD: 0 K/UL (ref 0–0.01)
NRBC BLD-RTO: 0 PER 100 WBC
PLATELET # BLD AUTO: 298 K/UL (ref 150–400)
PMV BLD AUTO: 9.9 FL (ref 8.9–12.9)
POTASSIUM SERPL-SCNC: 4.3 MMOL/L (ref 3.5–5.1)
RBC # BLD AUTO: 5.43 M/UL (ref 3.8–5.2)
SODIUM SERPL-SCNC: 131 MMOL/L (ref 136–145)
WBC # BLD AUTO: 7.3 K/UL (ref 3.6–11)

## 2024-01-04 PROCEDURE — 2580000003 HC RX 258: Performed by: STUDENT IN AN ORGANIZED HEALTH CARE EDUCATION/TRAINING PROGRAM

## 2024-01-04 PROCEDURE — 6360000002 HC RX W HCPCS: Performed by: INTERNAL MEDICINE

## 2024-01-04 PROCEDURE — 36415 COLL VENOUS BLD VENIPUNCTURE: CPT

## 2024-01-04 PROCEDURE — 2580000003 HC RX 258: Performed by: INTERNAL MEDICINE

## 2024-01-04 PROCEDURE — 6360000002 HC RX W HCPCS: Performed by: STUDENT IN AN ORGANIZED HEALTH CARE EDUCATION/TRAINING PROGRAM

## 2024-01-04 PROCEDURE — 6370000000 HC RX 637 (ALT 250 FOR IP): Performed by: STUDENT IN AN ORGANIZED HEALTH CARE EDUCATION/TRAINING PROGRAM

## 2024-01-04 PROCEDURE — 1100000000 HC RM PRIVATE

## 2024-01-04 PROCEDURE — 85027 COMPLETE CBC AUTOMATED: CPT

## 2024-01-04 PROCEDURE — 80048 BASIC METABOLIC PNL TOTAL CA: CPT

## 2024-01-04 RX ORDER — DEXAMETHASONE SODIUM PHOSPHATE 4 MG/ML
8 INJECTION, SOLUTION INTRA-ARTICULAR; INTRALESIONAL; INTRAMUSCULAR; INTRAVENOUS; SOFT TISSUE EVERY 6 HOURS
Status: DISCONTINUED | OUTPATIENT
Start: 2024-01-04 | End: 2024-01-06 | Stop reason: HOSPADM

## 2024-01-04 RX ORDER — SODIUM CHLORIDE 9 MG/ML
INJECTION, SOLUTION INTRAVENOUS CONTINUOUS
Status: DISCONTINUED | OUTPATIENT
Start: 2024-01-04 | End: 2024-01-05

## 2024-01-04 RX ADMIN — SODIUM CHLORIDE, PRESERVATIVE FREE 10 ML: 5 INJECTION INTRAVENOUS at 10:07

## 2024-01-04 RX ADMIN — DEXAMETHASONE SODIUM PHOSPHATE 8 MG: 4 INJECTION, SOLUTION INTRA-ARTICULAR; INTRALESIONAL; INTRAMUSCULAR; INTRAVENOUS; SOFT TISSUE at 23:54

## 2024-01-04 RX ADMIN — ASPIRIN 81 MG: 81 TABLET, CHEWABLE ORAL at 10:06

## 2024-01-04 RX ADMIN — HYDROCHLOROTHIAZIDE 25 MG: 25 TABLET ORAL at 10:07

## 2024-01-04 RX ADMIN — LISINOPRIL 20 MG: 20 TABLET ORAL at 10:07

## 2024-01-04 RX ADMIN — SODIUM CHLORIDE: 9 INJECTION, SOLUTION INTRAVENOUS at 21:14

## 2024-01-04 RX ADMIN — ENOXAPARIN SODIUM 40 MG: 100 INJECTION SUBCUTANEOUS at 10:07

## 2024-01-04 RX ADMIN — RANOLAZINE 500 MG: 500 TABLET, FILM COATED, EXTENDED RELEASE ORAL at 10:06

## 2024-01-04 RX ADMIN — ATORVASTATIN CALCIUM 40 MG: 40 TABLET, FILM COATED ORAL at 21:18

## 2024-01-04 RX ADMIN — RANOLAZINE 500 MG: 500 TABLET, FILM COATED, EXTENDED RELEASE ORAL at 21:18

## 2024-01-04 RX ADMIN — SODIUM CHLORIDE, PRESERVATIVE FREE 10 ML: 5 INJECTION INTRAVENOUS at 21:16

## 2024-01-04 RX ADMIN — DEXAMETHASONE SODIUM PHOSPHATE 8 MG: 4 INJECTION, SOLUTION INTRA-ARTICULAR; INTRALESIONAL; INTRAMUSCULAR; INTRAVENOUS; SOFT TISSUE at 18:00

## 2024-01-04 ASSESSMENT — PAIN SCALES - GENERAL
PAINLEVEL_OUTOF10: 0
PAINLEVEL_OUTOF10: 0

## 2024-01-04 ASSESSMENT — PAIN SCALES - WONG BAKER: WONGBAKER_NUMERICALRESPONSE: 0

## 2024-01-04 NOTE — CONSULTS
CARDIOLOGY CONSULTATION    REASON FOR CONSULT: Recent abnormal stress, Preop evaluation     REQUESTING PROVIDER: Dr. Phan    CHIEF COMPLAINT:  Weakness    HISTORY OF PRESENT ILLNESS:  Yusra Fontenot is a 68 y.o. year-old female with past medical history significant for hypertension, anxiety, angina who was evaluated today due to needing preoperative clearance for Cspine surgery. Patient initially presented to the ED with left-sided weakness. Patient states she had a fall about 2 weeks ago and had subsequent weakness on the left upper and lower extremities.  She also states she did have some right-sided lower extremity weakness.  States that she thought this was due to her fall and pain associated with weakness.  She went to her physical therapist and was told her weakness was not consistent pain related.  She was sent to her primary care physician, and after physical exam patient was sent to the emergency department. Head CT and MRI negative for stroke. MRI C-spine yesterday with multilevel severe cervical spinal stenosis as well as multilevel cord compression.     Patient seen recently in our office for ongoing chest pain. Equivocal NST, cath could not be done due to LGI bleed. CCTA abnormal, suggestive of 50% stenosis of mid to distal RCA by soft plaque. Calcifications of distal left main and LAD make luminal assessment somewhat difficult. No convincing evidence of greater than 50% stenoses of these vessels.Had plans for outpatient cath Jan. 8th given this result and ongoing chest pain.     Seen today, sitting upright in chair. She continues to have numbness and weakness in her left upper arm as well as difficulty ambulating.     Records from hospital admission course thus far reviewed.      Telemetry reviewed.      INPATIENT MEDICATIONS:  Home medications reviewed.    Current Facility-Administered Medications:     oxyCODONE (ROXICODONE) immediate release tablet 5 mg, 5 mg, Oral, Q6H PRN, [place],

## 2024-01-04 NOTE — PROGRESS NOTES
Dr. Phan requested a tele-neurology consult in regards to MRI results.     Adjacent Tele-Neuro consult called in. Patient placed in general neurology consult que.

## 2024-01-04 NOTE — PROGRESS NOTES
Hospitalist Progress Note               Daily Progress Note: 1/4/2024      Chief complaint:   Chief Complaint   Patient presents with    Extremity Weakness        Subjective:   Hospital course to date:    68-year-old female with past medical history of hypertension, anxiety, angina who presented to the emergency department with left-sided weakness.  Patient states she had a fall about 2 weeks previous and  had weakness on the left upper and lower extremities.  She also states she did have some right-sided lower extremity weakness.  States that she thought this was due to her fall and pain associated with weakness.  She went to her physical therapist and was told her weakness was not consistent pain related.  She was sent to her primary care physician after physical exam patient was sent to the emergency department.      In the ED, head CT was negative.  She was admitted for further workup.     MRI was negative for stroke     PT and OT recommending IRF    --------  Patient is seen today for follow-up. She is awake and alert. She still reports difficulty ambulating and mild lower extremity pain.      She continues to complain of numbness and weakness in the left arm and hand but also has symptoms of tingling and numbness in her right hand as well.  The bilateral lower extremity weakness which is mild, also continues    Her MRI of the C-spine done yesterday showed multilevel severe cervical spinal stenosis as well as multilevel cord compression with myelomalacia            Medications reviewed  Current Facility-Administered Medications   Medication Dose Route Frequency    aspirin chewable tablet 81 mg  81 mg Oral Daily    oxyCODONE (ROXICODONE) immediate release tablet 5 mg  5 mg Oral Q6H PRN    atorvastatin (LIPITOR) tablet 40 mg  40 mg Oral Nightly    lisinopril (PRINIVIL;ZESTRIL) tablet 20 mg  20 mg Oral Daily    hydroCHLOROthiazide (HYDRODIURIL) tablet 25 mg  25 mg Oral Daily    ranolazine (RANEXA) extended  release tablet 500 mg  500 mg Oral BID    hydrOXYzine pamoate (VISTARIL) capsule 25 mg  25 mg Oral TID PRN    sodium chloride flush 0.9 % injection 5-40 mL  5-40 mL IntraVENous 2 times per day    sodium chloride flush 0.9 % injection 5-40 mL  5-40 mL IntraVENous PRN    0.9 % sodium chloride infusion   IntraVENous PRN    ondansetron (ZOFRAN-ODT) disintegrating tablet 4 mg  4 mg Oral Q8H PRN    Or    ondansetron (ZOFRAN) injection 4 mg  4 mg IntraVENous Q6H PRN    polyethylene glycol (GLYCOLAX) packet 17 g  17 g Oral Daily PRN    enoxaparin (LOVENOX) injection 40 mg  40 mg SubCUTAneous Daily    labetalol (NORMODYNE;TRANDATE) injection 10 mg  10 mg IntraVENous Q6H PRN       Review of Systems:   A comprehensive review of systems was negative.    Objective:   Physical Exam:     /60   Pulse 70   Temp 98.2 °F (36.8 °C) (Oral)   Resp 18   Ht 1.626 m (5' 4\")   Wt 80.3 kg (177 lb)   SpO2 100%   BMI 30.38 kg/m²    O2 Device: None (Room air)    Temp (24hrs), Av.9 °F (36.6 °C), Min:97.3 °F (36.3 °C), Max:98.2 °F (36.8 °C)    No intake/output data recorded.    1901 -  0700  In: 1520 [P.O.:1520]  Out: 1750 [Urine:1750]    Mode Rate TV Press PEEP FiO2 PIP Min. Vent                              General:   Awake and alert   Lungs:   Clear to auscultation bilaterally.   Chest wall:  No tenderness or deformity.   Heart:  Regular rate and rhythm, S1, S2 normal, no murmur, click, rub or gallop.   Abdomen:   Soft, non-tender. Bowel sounds normal. No masses,  No organomegaly.   Extremities: Extremities normal, atraumatic, no cyanosis or edema. No tenderness   Pulses: 2+ and symmetric all extremities.   Skin: Skin color, texture, turgor normal. No rashes or lesions   Neurologic: CNII-XII intact.  Proximal left upper extremity strength 4/5, distal hand strength 3/5.  Right upper extremity strength intact.  Bilateral lower extremity strength 4/5         Data Review:       Recent Days:  No results for input(s):

## 2024-01-05 LAB
ACT BLD: 271 SEC (ref 74–125)
ACT BLD: 309 SEC (ref 74–125)
ECHO BSA: 1.9 M2
PERFORMED BY:: ABNORMAL
PERFORMED BY:: ABNORMAL

## 2024-01-05 PROCEDURE — B2111ZZ FLUOROSCOPY OF MULTIPLE CORONARY ARTERIES USING LOW OSMOLAR CONTRAST: ICD-10-PCS | Performed by: STUDENT IN AN ORGANIZED HEALTH CARE EDUCATION/TRAINING PROGRAM

## 2024-01-05 PROCEDURE — 85347 COAGULATION TIME ACTIVATED: CPT

## 2024-01-05 PROCEDURE — 2709999900 HC NON-CHARGEABLE SUPPLY: Performed by: STUDENT IN AN ORGANIZED HEALTH CARE EDUCATION/TRAINING PROGRAM

## 2024-01-05 PROCEDURE — 2580000003 HC RX 258: Performed by: STUDENT IN AN ORGANIZED HEALTH CARE EDUCATION/TRAINING PROGRAM

## 2024-01-05 PROCEDURE — C1769 GUIDE WIRE: HCPCS | Performed by: STUDENT IN AN ORGANIZED HEALTH CARE EDUCATION/TRAINING PROGRAM

## 2024-01-05 PROCEDURE — 6360000002 HC RX W HCPCS: Performed by: STUDENT IN AN ORGANIZED HEALTH CARE EDUCATION/TRAINING PROGRAM

## 2024-01-05 PROCEDURE — B240ZZ3 ULTRASONOGRAPHY OF SINGLE CORONARY ARTERY, INTRAVASCULAR: ICD-10-PCS | Performed by: STUDENT IN AN ORGANIZED HEALTH CARE EDUCATION/TRAINING PROGRAM

## 2024-01-05 PROCEDURE — 93571 IV DOP VEL&/PRESS C FLO 1ST: CPT | Performed by: STUDENT IN AN ORGANIZED HEALTH CARE EDUCATION/TRAINING PROGRAM

## 2024-01-05 PROCEDURE — 4A023N7 MEASUREMENT OF CARDIAC SAMPLING AND PRESSURE, LEFT HEART, PERCUTANEOUS APPROACH: ICD-10-PCS | Performed by: STUDENT IN AN ORGANIZED HEALTH CARE EDUCATION/TRAINING PROGRAM

## 2024-01-05 PROCEDURE — 6370000000 HC RX 637 (ALT 250 FOR IP): Performed by: STUDENT IN AN ORGANIZED HEALTH CARE EDUCATION/TRAINING PROGRAM

## 2024-01-05 PROCEDURE — 6360000002 HC RX W HCPCS: Performed by: INTERNAL MEDICINE

## 2024-01-05 PROCEDURE — 6360000004 HC RX CONTRAST MEDICATION: Performed by: STUDENT IN AN ORGANIZED HEALTH CARE EDUCATION/TRAINING PROGRAM

## 2024-01-05 PROCEDURE — 2500000003 HC RX 250 WO HCPCS: Performed by: STUDENT IN AN ORGANIZED HEALTH CARE EDUCATION/TRAINING PROGRAM

## 2024-01-05 PROCEDURE — C1887 CATHETER, GUIDING: HCPCS | Performed by: STUDENT IN AN ORGANIZED HEALTH CARE EDUCATION/TRAINING PROGRAM

## 2024-01-05 PROCEDURE — 7100000000 HC PACU RECOVERY - FIRST 15 MIN: Performed by: STUDENT IN AN ORGANIZED HEALTH CARE EDUCATION/TRAINING PROGRAM

## 2024-01-05 PROCEDURE — 7100000001 HC PACU RECOVERY - ADDTL 15 MIN: Performed by: STUDENT IN AN ORGANIZED HEALTH CARE EDUCATION/TRAINING PROGRAM

## 2024-01-05 PROCEDURE — 93458 L HRT ARTERY/VENTRICLE ANGIO: CPT | Performed by: STUDENT IN AN ORGANIZED HEALTH CARE EDUCATION/TRAINING PROGRAM

## 2024-01-05 PROCEDURE — 92978 ENDOLUMINL IVUS OCT C 1ST: CPT | Performed by: STUDENT IN AN ORGANIZED HEALTH CARE EDUCATION/TRAINING PROGRAM

## 2024-01-05 PROCEDURE — B2151ZZ FLUOROSCOPY OF LEFT HEART USING LOW OSMOLAR CONTRAST: ICD-10-PCS | Performed by: STUDENT IN AN ORGANIZED HEALTH CARE EDUCATION/TRAINING PROGRAM

## 2024-01-05 PROCEDURE — 97530 THERAPEUTIC ACTIVITIES: CPT

## 2024-01-05 PROCEDURE — C1753 CATH, INTRAVAS ULTRASOUND: HCPCS | Performed by: STUDENT IN AN ORGANIZED HEALTH CARE EDUCATION/TRAINING PROGRAM

## 2024-01-05 PROCEDURE — C1894 INTRO/SHEATH, NON-LASER: HCPCS | Performed by: STUDENT IN AN ORGANIZED HEALTH CARE EDUCATION/TRAINING PROGRAM

## 2024-01-05 PROCEDURE — 2060000000 HC ICU INTERMEDIATE R&B

## 2024-01-05 PROCEDURE — 99152 MOD SED SAME PHYS/QHP 5/>YRS: CPT | Performed by: STUDENT IN AN ORGANIZED HEALTH CARE EDUCATION/TRAINING PROGRAM

## 2024-01-05 PROCEDURE — 99153 MOD SED SAME PHYS/QHP EA: CPT | Performed by: STUDENT IN AN ORGANIZED HEALTH CARE EDUCATION/TRAINING PROGRAM

## 2024-01-05 RX ORDER — LIDOCAINE HYDROCHLORIDE 10 MG/ML
INJECTION, SOLUTION INFILTRATION; PERINEURAL PRN
Status: DISCONTINUED | OUTPATIENT
Start: 2024-01-05 | End: 2024-01-05 | Stop reason: HOSPADM

## 2024-01-05 RX ORDER — SODIUM CHLORIDE 9 MG/ML
INJECTION, SOLUTION INTRAVENOUS PRN
Status: DISCONTINUED | OUTPATIENT
Start: 2024-01-05 | End: 2024-01-06 | Stop reason: HOSPADM

## 2024-01-05 RX ORDER — SODIUM CHLORIDE 0.9 % (FLUSH) 0.9 %
5-40 SYRINGE (ML) INJECTION PRN
Status: CANCELLED | OUTPATIENT
Start: 2024-01-05

## 2024-01-05 RX ORDER — DEXAMETHASONE SODIUM PHOSPHATE 4 MG/ML
8 INJECTION, SOLUTION INTRA-ARTICULAR; INTRALESIONAL; INTRAMUSCULAR; INTRAVENOUS; SOFT TISSUE EVERY 6 HOURS
Status: CANCELLED | OUTPATIENT
Start: 2024-01-05

## 2024-01-05 RX ORDER — SODIUM CHLORIDE 0.9 % (FLUSH) 0.9 %
5-40 SYRINGE (ML) INJECTION EVERY 12 HOURS SCHEDULED
Status: DISCONTINUED | OUTPATIENT
Start: 2024-01-05 | End: 2024-01-06 | Stop reason: HOSPADM

## 2024-01-05 RX ORDER — ONDANSETRON 4 MG/1
4 TABLET, ORALLY DISINTEGRATING ORAL EVERY 8 HOURS PRN
Status: CANCELLED | OUTPATIENT
Start: 2024-01-05

## 2024-01-05 RX ORDER — HYDROXYZINE PAMOATE 25 MG/1
25 CAPSULE ORAL 3 TIMES DAILY PRN
Status: CANCELLED | OUTPATIENT
Start: 2024-01-05

## 2024-01-05 RX ORDER — SODIUM CHLORIDE 9 MG/ML
INJECTION, SOLUTION INTRAVENOUS PRN
Status: CANCELLED | OUTPATIENT
Start: 2024-01-05

## 2024-01-05 RX ORDER — OXYCODONE HYDROCHLORIDE 5 MG/1
5 TABLET ORAL EVERY 6 HOURS PRN
Status: CANCELLED | OUTPATIENT
Start: 2024-01-05

## 2024-01-05 RX ORDER — HYDROCHLOROTHIAZIDE 25 MG/1
25 TABLET ORAL DAILY
Status: CANCELLED | OUTPATIENT
Start: 2024-01-06

## 2024-01-05 RX ORDER — SODIUM CHLORIDE 9 MG/ML
INJECTION, SOLUTION INTRAVENOUS CONTINUOUS
Status: CANCELLED | OUTPATIENT
Start: 2024-01-05 | End: 2024-01-05

## 2024-01-05 RX ORDER — ACETAMINOPHEN 325 MG/1
650 TABLET ORAL EVERY 4 HOURS PRN
Status: DISCONTINUED | OUTPATIENT
Start: 2024-01-05 | End: 2024-01-06 | Stop reason: HOSPADM

## 2024-01-05 RX ORDER — POLYETHYLENE GLYCOL 3350 17 G/17G
17 POWDER, FOR SOLUTION ORAL DAILY PRN
Status: CANCELLED | OUTPATIENT
Start: 2024-01-05

## 2024-01-05 RX ORDER — MIDAZOLAM HYDROCHLORIDE 1 MG/ML
INJECTION INTRAMUSCULAR; INTRAVENOUS PRN
Status: DISCONTINUED | OUTPATIENT
Start: 2024-01-05 | End: 2024-01-05 | Stop reason: HOSPADM

## 2024-01-05 RX ORDER — ATORVASTATIN CALCIUM 40 MG/1
40 TABLET, FILM COATED ORAL NIGHTLY
Status: CANCELLED | OUTPATIENT
Start: 2024-01-05

## 2024-01-05 RX ORDER — RANOLAZINE 500 MG/1
500 TABLET, EXTENDED RELEASE ORAL 2 TIMES DAILY
Status: CANCELLED | OUTPATIENT
Start: 2024-01-05

## 2024-01-05 RX ORDER — SODIUM CHLORIDE 0.9 % (FLUSH) 0.9 %
5-40 SYRINGE (ML) INJECTION PRN
Status: DISCONTINUED | OUTPATIENT
Start: 2024-01-05 | End: 2024-01-06 | Stop reason: HOSPADM

## 2024-01-05 RX ORDER — ENOXAPARIN SODIUM 100 MG/ML
40 INJECTION SUBCUTANEOUS DAILY
Status: CANCELLED | OUTPATIENT
Start: 2024-01-06

## 2024-01-05 RX ORDER — LABETALOL HYDROCHLORIDE 5 MG/ML
10 INJECTION, SOLUTION INTRAVENOUS EVERY 6 HOURS PRN
Status: CANCELLED | OUTPATIENT
Start: 2024-01-05

## 2024-01-05 RX ORDER — HEPARIN SODIUM 1000 [USP'U]/ML
INJECTION, SOLUTION INTRAVENOUS; SUBCUTANEOUS PRN
Status: DISCONTINUED | OUTPATIENT
Start: 2024-01-05 | End: 2024-01-05 | Stop reason: HOSPADM

## 2024-01-05 RX ORDER — SODIUM CHLORIDE 0.9 % (FLUSH) 0.9 %
5-40 SYRINGE (ML) INJECTION EVERY 12 HOURS SCHEDULED
Status: CANCELLED | OUTPATIENT
Start: 2024-01-05

## 2024-01-05 RX ORDER — ONDANSETRON 2 MG/ML
4 INJECTION INTRAMUSCULAR; INTRAVENOUS EVERY 6 HOURS PRN
Status: CANCELLED | OUTPATIENT
Start: 2024-01-05

## 2024-01-05 RX ORDER — LISINOPRIL 20 MG/1
20 TABLET ORAL DAILY
Status: CANCELLED | OUTPATIENT
Start: 2024-01-06

## 2024-01-05 RX ORDER — FENTANYL CITRATE 50 UG/ML
INJECTION, SOLUTION INTRAMUSCULAR; INTRAVENOUS PRN
Status: DISCONTINUED | OUTPATIENT
Start: 2024-01-05 | End: 2024-01-05 | Stop reason: HOSPADM

## 2024-01-05 RX ORDER — ACETAMINOPHEN 325 MG/1
650 TABLET ORAL EVERY 4 HOURS PRN
Status: CANCELLED | OUTPATIENT
Start: 2024-01-05

## 2024-01-05 RX ADMIN — SODIUM CHLORIDE, PRESERVATIVE FREE 10 ML: 5 INJECTION INTRAVENOUS at 09:36

## 2024-01-05 RX ADMIN — LISINOPRIL 20 MG: 20 TABLET ORAL at 09:36

## 2024-01-05 RX ADMIN — RANOLAZINE 500 MG: 500 TABLET, FILM COATED, EXTENDED RELEASE ORAL at 09:36

## 2024-01-05 RX ADMIN — DEXAMETHASONE SODIUM PHOSPHATE 8 MG: 4 INJECTION, SOLUTION INTRA-ARTICULAR; INTRALESIONAL; INTRAMUSCULAR; INTRAVENOUS; SOFT TISSUE at 06:01

## 2024-01-05 RX ADMIN — HYDROCHLOROTHIAZIDE 25 MG: 25 TABLET ORAL at 09:36

## 2024-01-05 RX ADMIN — SODIUM CHLORIDE, PRESERVATIVE FREE 10 ML: 5 INJECTION INTRAVENOUS at 21:16

## 2024-01-05 RX ADMIN — OXYCODONE 5 MG: 5 TABLET ORAL at 16:40

## 2024-01-05 RX ADMIN — ATORVASTATIN CALCIUM 40 MG: 40 TABLET, FILM COATED ORAL at 21:15

## 2024-01-05 RX ADMIN — DEXAMETHASONE SODIUM PHOSPHATE 8 MG: 4 INJECTION, SOLUTION INTRA-ARTICULAR; INTRALESIONAL; INTRAMUSCULAR; INTRAVENOUS; SOFT TISSUE at 18:31

## 2024-01-05 RX ADMIN — RANOLAZINE 500 MG: 500 TABLET, FILM COATED, EXTENDED RELEASE ORAL at 21:16

## 2024-01-05 ASSESSMENT — PAIN SCALES - GENERAL
PAINLEVEL_OUTOF10: 5
PAINLEVEL_OUTOF10: 0
PAINLEVEL_OUTOF10: 0

## 2024-01-05 ASSESSMENT — PAIN - FUNCTIONAL ASSESSMENT: PAIN_FUNCTIONAL_ASSESSMENT: ACTIVITIES ARE NOT PREVENTED

## 2024-01-05 ASSESSMENT — PAIN DESCRIPTION - ORIENTATION: ORIENTATION: RIGHT

## 2024-01-05 ASSESSMENT — PAIN DESCRIPTION - DESCRIPTORS: DESCRIPTORS: THROBBING

## 2024-01-05 ASSESSMENT — PAIN DESCRIPTION - LOCATION: LOCATION: LEG;ARM

## 2024-01-05 NOTE — PROGRESS NOTES
IntraVENous Continuous    dexAMETHasone (DECADRON) injection 8 mg  8 mg IntraVENous Q6H    oxyCODONE (ROXICODONE) immediate release tablet 5 mg  5 mg Oral Q6H PRN    atorvastatin (LIPITOR) tablet 40 mg  40 mg Oral Nightly    lisinopril (PRINIVIL;ZESTRIL) tablet 20 mg  20 mg Oral Daily    hydroCHLOROthiazide (HYDRODIURIL) tablet 25 mg  25 mg Oral Daily    ranolazine (RANEXA) extended release tablet 500 mg  500 mg Oral BID    hydrOXYzine pamoate (VISTARIL) capsule 25 mg  25 mg Oral TID PRN    sodium chloride flush 0.9 % injection 5-40 mL  5-40 mL IntraVENous 2 times per day    sodium chloride flush 0.9 % injection 5-40 mL  5-40 mL IntraVENous PRN    0.9 % sodium chloride infusion   IntraVENous PRN    ondansetron (ZOFRAN-ODT) disintegrating tablet 4 mg  4 mg Oral Q8H PRN    Or    ondansetron (ZOFRAN) injection 4 mg  4 mg IntraVENous Q6H PRN    polyethylene glycol (GLYCOLAX) packet 17 g  17 g Oral Daily PRN    enoxaparin (LOVENOX) injection 40 mg  40 mg SubCUTAneous Daily    labetalol (NORMODYNE;TRANDATE) injection 10 mg  10 mg IntraVENous Q6H PRN       Review of Systems:   Pertinent items are noted in HPI.    Objective:   Physical Exam:     /73   Pulse 70   Temp 97.2 °F (36.2 °C) (Oral)   Resp 18   Ht 1.626 m (5' 4\")   Wt 80.3 kg (177 lb)   SpO2 97%   BMI 30.38 kg/m²    O2 Device: None (Room air)    Temp (24hrs), Av °F (36.7 °C), Min:97.2 °F (36.2 °C), Max:98.4 °F (36.9 °C)    No intake/output data recorded.    1901 -  0700  In: 1110 [P.O.:1100; I.V.:10]  Out: 1150 [Urine:1150]    Mode Rate TV Press PEEP FiO2 PIP Min. Vent                              General:   Awake and alert   Lungs:   Clear to auscultation bilaterally.   Chest wall:  No tenderness or deformity.   Heart:  Regular rate and rhythm, S1, S2 normal, no murmur, click, rub or gallop.   Abdomen:   Soft, non-tender. Bowel sounds normal. No masses,  No organomegaly.   Extremities: Extremities normal, atraumatic, no cyanosis or  chronic microvascular ischemic   disease.         CT HEAD WO CONTRAST   Final Result   No acute process on noncontrast CT.            CTA HEAD NECK W CONTRAST   Final Result   1.  No large vessel occlusion or significant flow-limiting stenosis.   2.  No evidence of perfusion defects.          CT BRAIN PERFUSION   Final Result   1.  No large vessel occlusion or significant flow-limiting stenosis.   2.  No evidence of perfusion defects.          XR CHEST PORTABLE   Final Result      No acute process on portable chest.                Discussion/MDM:   Patient with multiple medical comorbidities, each with high likelihood for morbidity and mortality if left untreated.   I have reviewed patient's presenting subjective and objective findings, as well as all laboratory studies, imaging studies, and vital signs to date as well as treatment rendered and patient's response to those treatments.      In addition, prior medical, surgical and relevant social and family histories were reviewed. I have discussed management plan with patient/family and with nursing staff.     I personally reviewed labs: all labs past 24 hours  I personally reviewed imaging/EKG:  Toxic drug monitoring:   Discussed case with:         Assessment:    Bilateral lower extremity and generalized weakness.   At this point, appears to be due to cervical myelopathy with MRI showing severe multilevel cervical spinal stenosis and cord compression     Essential hypertension    hyponatremia     Chronic kidney disease stage III     Hyperlipidemia      Plan:    Cardiac cath today    Awaiting call from [facility] hospitalist for transfer        Code status: Full code    Social determinants of health: none      Estimated discharge date//time frame/disposition:    Barriers to discharge: hospitalist consult from [facility]. Cardiac cath          All Navarrete

## 2024-01-05 NOTE — PROGRESS NOTES
Comprehensive Nutrition Assessment    Type and Reason for Visit:  Initial, RD Nutrition Re-Screen/LOS (LOS day 8)    Nutrition Recommendations/Plan:   Continue current diet, consider liberalizing to regular   Please obtain an updated scaled wt as able   Monitor/document PO/ONS intake in I/Os     Malnutrition Assessment:  Malnutrition Status:  Insufficient data (01/05/24 1640)    Context:  Acute Illness     Findings of the 6 clinical characteristics of malnutrition:  Energy Intake:  Unable to assess (limited documented intakes, pt off floor)  Weight Loss:  No significant weight loss     Body Fat Loss:  Unable to assess     Muscle Mass Loss:  Unable to assess    Fluid Accumulation:  No significant fluid accumulation     Strength:  Not Performed    Nutrition Assessment:    Presents w/ L-sided weakness. MRI of C spine showing spinal stenosis and cord compression, neurosurgery consulted - pending transfer to [facility] for neurosurgery. Chart reviewed for LOS day 8. No wt loss per EMR. Attempted to visit pt, off floor; attempted to call pt x 2 - no answer. 51 - 75% intake x 1 doc meal. Continue current diet, consider libralization to regular. RD to continue to monitor at f/u to assess for need of ONS. Labs: no updated labs. Meds: Lipitor, Decadron, Lisinopril, Ranexa, NaCl.    Nutrition Related Findings:    NFPE deferred, pt off floor - will attempt at f/u as appropriate. LBM 1/4, no edema noted. No hx of dysphagia per chart review. No documented N/V/D/C Wound Type: None        Current Nutrition Intake & Therapies:    Average Meal Intake: 51-75%  Average Supplements Intake: None Ordered  ADULT DIET; Regular; 4 carb choices (60 gm/meal); Low Fat/Low Chol/High Fiber/2 gm Na    Anthropometric Measures:  Height: 162.6 cm (5' 4.02\")  Ideal Body Weight (IBW): 120 lbs (55 kg)       Current Body Weight: 80.3 kg (177 lb 0.5 oz), 147.5 % IBW. Weight Source: Stated  Current BMI (kg/m2): 30.4        Weight Adjustment For: No

## 2024-01-05 NOTE — DISCHARGE SUMMARY
Physician transfer summary     Patient ID:    Yusra Fontenot  [id number]  68 y.o.  1955    Admit date: 12/28/2023    Discharge date : 1/5/2024      Final Diagnoses:   Bilateral lower extremity and generalized weakness.   At this point, appears to be due to cervical myelopathy with MRI showing severe multilevel cervical spinal stenosis and cord compression     Essential hypertension     hyponatremia     Chronic kidney disease stage III     Hyperlipidemia    Noncritical 60% RCA stenosis.  To be managed medically         Reason for Hospitalization:  68-year-old female with past medical history of hypertension, anxiety, angina who presented to the emergency department with left-sided weakness.  Patient states she had a fall about 2 weeks previous and  had weakness on the left upper and lower extremities.  She also states she did have some right-sided lower extremity weakness.  States that she thought this was due to her fall and pain associated with weakness.  She went to her physical therapist and was told her weakness was not consistent pain related.  She was sent to her primary care physician after physical exam patient was sent to the emergency department.      In the ED, head CT was negative.       Hospital Course:   She was admitted for further workup.     MRI was negative for stroke     PT and OT recommending IRF     Due to persistent symptoms of weakness and numbness in both upper and lower extremities bilaterally, patient subsequently underwent MRI of C spine showed multilevel severe cervical spinal stenosis and multilevel cord compression with myelomalacia.   Neurosurgery was consulted and she has been accepted to [facility] for neurosurgical intervention     Cardiology was also consulted for preop cardiac risk assessment. She has a planned cardiac cath today. Prior CCTA showed 50% stenosis of mid to distal RCA, calcifications of distal left main and LAD. Echo showed EF 65-70%.     Patient

## 2024-01-05 NOTE — PROGRESS NOTES
Attempted PT 1015 however pt in with BHATT and student doctor. Will continue to follow and attempt at a later time. Thank you.

## 2024-01-05 NOTE — PLAN OF CARE
OCCUPATIONAL THERAPY TREATMENT  Patient: Yusra Fontenot (68 y.o. female)  Date: 1/5/2024  Primary Diagnosis: CVA (cerebrovascular accident due to intracerebral hemorrhage) (HCC) [I61.9]  Cerebrovascular accident (CVA), unspecified mechanism (HCC) [I63.9]  Procedure(s) (LRB):  Left heart cath / coronary angiography (N/A)  Percutaneous coronary intervention (N/A)  Instantaneous wave-free ratio (iFR) (N/A)  Intravascular ultrasound (N/A) Day of Surgery   Precautions: Fall Risk, General Precautions                Recommendations for nursing mobility: Out of bed to chair for meals, Encourage HEP in prep for ADLs/mobility; see handout for details, AD and gt belt for bed to chair , Amb to bathroom with AD and gait belt, and Assist x1    In place during session: EKG/telemetry   Chart, occupational therapy assessment, plan of care, and goals were reviewed.  ASSESSMENT  Patient continues with skilled OT services and is progressing towards goals. Pt semi supine in bed upon OT arrival, agreeable to session. Pt A&O x 4. (See below for objective details and assist levels).     Overall pt tolerated session fair today with completion of bed mobility, transfers, and ADLs.  Pt educated on log rolling techniques with fair carryover.  Pt able to complete bed mobility with Min-ModA overall.  Pt ambulated in room and completed standing oral hygiene and face washing with CGA/Cade for balance.  Pt returned to EOB and to supine with increased assist.  Pt left semi supine in bed with MD student in room.  Pt required increased cueing for safety and log rolling during session. Potential barriers for safe discharge: pt is a high fall risk, pt is not safe to be alone, and concern for pt safely navigating or managing the home environment. Current OT recommendations for discharge [facility] . Will continue to benefit from skilled OT services, and will continue to progress as tolerated.     GOALS:  Problem: Occupational

## 2024-01-06 ENCOUNTER — HOSPITAL ENCOUNTER (INPATIENT)
Facility: HOSPITAL | Age: 69
LOS: 12 days | Discharge: NURSING, REHAB OR LONG-TERM CARE | DRG: 472 | End: 2024-01-18
Attending: HOSPITALIST | Admitting: STUDENT IN AN ORGANIZED HEALTH CARE EDUCATION/TRAINING PROGRAM
Payer: MEDICARE

## 2024-01-06 VITALS
OXYGEN SATURATION: 96 % | DIASTOLIC BLOOD PRESSURE: 66 MMHG | RESPIRATION RATE: 18 BRPM | HEIGHT: 64 IN | BODY MASS INDEX: 30.22 KG/M2 | TEMPERATURE: 98.2 F | SYSTOLIC BLOOD PRESSURE: 127 MMHG | WEIGHT: 177 LBS | HEART RATE: 68 BPM

## 2024-01-06 DIAGNOSIS — M54.12 CERVICAL RADICULOPATHY: Primary | ICD-10-CM

## 2024-01-06 PROCEDURE — 2580000003 HC RX 258: Performed by: STUDENT IN AN ORGANIZED HEALTH CARE EDUCATION/TRAINING PROGRAM

## 2024-01-06 PROCEDURE — 6370000000 HC RX 637 (ALT 250 FOR IP): Performed by: STUDENT IN AN ORGANIZED HEALTH CARE EDUCATION/TRAINING PROGRAM

## 2024-01-06 PROCEDURE — 6360000002 HC RX W HCPCS: Performed by: STUDENT IN AN ORGANIZED HEALTH CARE EDUCATION/TRAINING PROGRAM

## 2024-01-06 PROCEDURE — 6360000002 HC RX W HCPCS: Performed by: INTERNAL MEDICINE

## 2024-01-06 PROCEDURE — 2060000000 HC ICU INTERMEDIATE R&B

## 2024-01-06 RX ORDER — SODIUM CHLORIDE 0.9 % (FLUSH) 0.9 %
5-40 SYRINGE (ML) INJECTION EVERY 12 HOURS SCHEDULED
Status: DISCONTINUED | OUTPATIENT
Start: 2024-01-06 | End: 2024-01-18 | Stop reason: HOSPADM

## 2024-01-06 RX ORDER — POLYETHYLENE GLYCOL 3350 17 G/17G
17 POWDER, FOR SOLUTION ORAL DAILY PRN
Status: DISCONTINUED | OUTPATIENT
Start: 2024-01-06 | End: 2024-01-18 | Stop reason: HOSPADM

## 2024-01-06 RX ORDER — ACETAMINOPHEN 325 MG/1
650 TABLET ORAL EVERY 6 HOURS PRN
Status: DISCONTINUED | OUTPATIENT
Start: 2024-01-06 | End: 2024-01-18 | Stop reason: HOSPADM

## 2024-01-06 RX ORDER — ATORVASTATIN CALCIUM 40 MG/1
40 TABLET, FILM COATED ORAL NIGHTLY
Status: DISCONTINUED | OUTPATIENT
Start: 2024-01-06 | End: 2024-01-06 | Stop reason: SDUPTHER

## 2024-01-06 RX ORDER — LISINOPRIL AND HYDROCHLOROTHIAZIDE 25; 20 MG/1; MG/1
1 TABLET ORAL DAILY
Status: DISCONTINUED | OUTPATIENT
Start: 2024-01-07 | End: 2024-01-06

## 2024-01-06 RX ORDER — ACETAMINOPHEN 325 MG/1
650 TABLET ORAL EVERY 4 HOURS PRN
Status: DISCONTINUED | OUTPATIENT
Start: 2024-01-06 | End: 2024-01-06

## 2024-01-06 RX ORDER — SODIUM CHLORIDE 9 MG/ML
INJECTION, SOLUTION INTRAVENOUS PRN
Status: DISCONTINUED | OUTPATIENT
Start: 2024-01-06 | End: 2024-01-18 | Stop reason: HOSPADM

## 2024-01-06 RX ORDER — MAGNESIUM SULFATE IN WATER 40 MG/ML
2000 INJECTION, SOLUTION INTRAVENOUS PRN
Status: DISCONTINUED | OUTPATIENT
Start: 2024-01-06 | End: 2024-01-06

## 2024-01-06 RX ORDER — METOPROLOL SUCCINATE 25 MG/1
25 TABLET, EXTENDED RELEASE ORAL DAILY
Status: DISCONTINUED | OUTPATIENT
Start: 2024-01-06 | End: 2024-01-06 | Stop reason: HOSPADM

## 2024-01-06 RX ORDER — POTASSIUM CHLORIDE 7.45 MG/ML
10 INJECTION INTRAVENOUS PRN
Status: DISCONTINUED | OUTPATIENT
Start: 2024-01-06 | End: 2024-01-18 | Stop reason: HOSPADM

## 2024-01-06 RX ORDER — HYDROXYZINE PAMOATE 25 MG/1
25 CAPSULE ORAL 3 TIMES DAILY PRN
Status: DISCONTINUED | OUTPATIENT
Start: 2024-01-06 | End: 2024-01-06

## 2024-01-06 RX ORDER — ENOXAPARIN SODIUM 100 MG/ML
40 INJECTION SUBCUTANEOUS DAILY
Status: DISCONTINUED | OUTPATIENT
Start: 2024-01-07 | End: 2024-01-09

## 2024-01-06 RX ORDER — LABETALOL HYDROCHLORIDE 5 MG/ML
10 INJECTION, SOLUTION INTRAVENOUS EVERY 6 HOURS PRN
Status: DISCONTINUED | OUTPATIENT
Start: 2024-01-06 | End: 2024-01-18 | Stop reason: HOSPADM

## 2024-01-06 RX ORDER — DEXAMETHASONE SODIUM PHOSPHATE 4 MG/ML
8 INJECTION, SOLUTION INTRA-ARTICULAR; INTRALESIONAL; INTRAMUSCULAR; INTRAVENOUS; SOFT TISSUE EVERY 6 HOURS
Status: DISCONTINUED | OUTPATIENT
Start: 2024-01-07 | End: 2024-01-06

## 2024-01-06 RX ORDER — POTASSIUM CHLORIDE 750 MG/1
40 TABLET, FILM COATED, EXTENDED RELEASE ORAL PRN
Status: DISCONTINUED | OUTPATIENT
Start: 2024-01-06 | End: 2024-01-18 | Stop reason: HOSPADM

## 2024-01-06 RX ORDER — SODIUM CHLORIDE 9 MG/ML
INJECTION, SOLUTION INTRAVENOUS CONTINUOUS
Status: DISPENSED | OUTPATIENT
Start: 2024-01-06 | End: 2024-01-07

## 2024-01-06 RX ORDER — HYDROCHLOROTHIAZIDE 25 MG/1
25 TABLET ORAL DAILY
Status: DISCONTINUED | OUTPATIENT
Start: 2024-01-07 | End: 2024-01-18 | Stop reason: HOSPADM

## 2024-01-06 RX ORDER — ONDANSETRON 2 MG/ML
4 INJECTION INTRAMUSCULAR; INTRAVENOUS EVERY 6 HOURS PRN
Status: DISCONTINUED | OUTPATIENT
Start: 2024-01-06 | End: 2024-01-18 | Stop reason: HOSPADM

## 2024-01-06 RX ORDER — OXYCODONE HYDROCHLORIDE 5 MG/1
5 TABLET ORAL EVERY 6 HOURS PRN
Status: DISCONTINUED | OUTPATIENT
Start: 2024-01-06 | End: 2024-01-10

## 2024-01-06 RX ORDER — SODIUM CHLORIDE 0.9 % (FLUSH) 0.9 %
5-40 SYRINGE (ML) INJECTION PRN
Status: DISCONTINUED | OUTPATIENT
Start: 2024-01-06 | End: 2024-01-06

## 2024-01-06 RX ORDER — ATORVASTATIN CALCIUM 40 MG/1
40 TABLET, FILM COATED ORAL NIGHTLY
Status: DISCONTINUED | OUTPATIENT
Start: 2024-01-06 | End: 2024-01-18 | Stop reason: HOSPADM

## 2024-01-06 RX ORDER — ONDANSETRON 4 MG/1
4 TABLET, ORALLY DISINTEGRATING ORAL EVERY 8 HOURS PRN
Status: DISCONTINUED | OUTPATIENT
Start: 2024-01-06 | End: 2024-01-10 | Stop reason: SDUPTHER

## 2024-01-06 RX ORDER — ONDANSETRON 2 MG/ML
4 INJECTION INTRAMUSCULAR; INTRAVENOUS EVERY 6 HOURS PRN
Status: DISCONTINUED | OUTPATIENT
Start: 2024-01-06 | End: 2024-01-07 | Stop reason: SDUPTHER

## 2024-01-06 RX ORDER — RANOLAZINE 500 MG/1
500 TABLET, EXTENDED RELEASE ORAL 2 TIMES DAILY
Status: DISCONTINUED | OUTPATIENT
Start: 2024-01-06 | End: 2024-01-06

## 2024-01-06 RX ORDER — HYDROXYZINE HYDROCHLORIDE 25 MG/1
25 TABLET, FILM COATED ORAL 3 TIMES DAILY PRN
Status: DISCONTINUED | OUTPATIENT
Start: 2024-01-06 | End: 2024-01-18 | Stop reason: HOSPADM

## 2024-01-06 RX ORDER — ACETAMINOPHEN 650 MG/1
650 SUPPOSITORY RECTAL EVERY 6 HOURS PRN
Status: DISCONTINUED | OUTPATIENT
Start: 2024-01-06 | End: 2024-01-18 | Stop reason: HOSPADM

## 2024-01-06 RX ORDER — SODIUM CHLORIDE 0.9 % (FLUSH) 0.9 %
5-40 SYRINGE (ML) INJECTION PRN
Status: DISCONTINUED | OUTPATIENT
Start: 2024-01-06 | End: 2024-01-18 | Stop reason: HOSPADM

## 2024-01-06 RX ORDER — SODIUM CHLORIDE 9 MG/ML
INJECTION, SOLUTION INTRAVENOUS PRN
Status: DISCONTINUED | OUTPATIENT
Start: 2024-01-06 | End: 2024-01-06

## 2024-01-06 RX ORDER — METOPROLOL SUCCINATE 25 MG/1
25 TABLET, EXTENDED RELEASE ORAL DAILY
Status: DISCONTINUED | OUTPATIENT
Start: 2024-01-06 | End: 2024-01-06

## 2024-01-06 RX ORDER — POLYETHYLENE GLYCOL 3350 17 G/17G
17 POWDER, FOR SOLUTION ORAL DAILY PRN
Status: DISCONTINUED | OUTPATIENT
Start: 2024-01-06 | End: 2024-01-06

## 2024-01-06 RX ORDER — METOPROLOL SUCCINATE 25 MG/1
25 TABLET, EXTENDED RELEASE ORAL DAILY
Status: DISCONTINUED | OUTPATIENT
Start: 2024-01-07 | End: 2024-01-18 | Stop reason: HOSPADM

## 2024-01-06 RX ORDER — RANOLAZINE 500 MG/1
500 TABLET, EXTENDED RELEASE ORAL 2 TIMES DAILY
Status: DISCONTINUED | OUTPATIENT
Start: 2024-01-06 | End: 2024-01-18 | Stop reason: HOSPADM

## 2024-01-06 RX ORDER — ONDANSETRON 4 MG/1
4 TABLET, ORALLY DISINTEGRATING ORAL EVERY 8 HOURS PRN
Status: DISCONTINUED | OUTPATIENT
Start: 2024-01-06 | End: 2024-01-06

## 2024-01-06 RX ORDER — LISINOPRIL 20 MG/1
20 TABLET ORAL DAILY
Status: DISCONTINUED | OUTPATIENT
Start: 2024-01-07 | End: 2024-01-18 | Stop reason: HOSPADM

## 2024-01-06 RX ORDER — HEPARIN SODIUM 5000 [USP'U]/ML
5000 INJECTION, SOLUTION INTRAVENOUS; SUBCUTANEOUS EVERY 8 HOURS SCHEDULED
Status: DISCONTINUED | OUTPATIENT
Start: 2024-01-06 | End: 2024-01-07 | Stop reason: SDUPTHER

## 2024-01-06 RX ADMIN — HEPARIN SODIUM 5000 UNITS: 5000 INJECTION INTRAVENOUS; SUBCUTANEOUS at 22:08

## 2024-01-06 RX ADMIN — ENOXAPARIN SODIUM 40 MG: 100 INJECTION SUBCUTANEOUS at 09:02

## 2024-01-06 RX ADMIN — DEXAMETHASONE SODIUM PHOSPHATE 8 MG: 4 INJECTION, SOLUTION INTRA-ARTICULAR; INTRALESIONAL; INTRAMUSCULAR; INTRAVENOUS; SOFT TISSUE at 06:26

## 2024-01-06 RX ADMIN — SODIUM CHLORIDE, PRESERVATIVE FREE 10 ML: 5 INJECTION INTRAVENOUS at 09:04

## 2024-01-06 RX ADMIN — DEXAMETHASONE SODIUM PHOSPHATE 8 MG: 4 INJECTION, SOLUTION INTRA-ARTICULAR; INTRALESIONAL; INTRAMUSCULAR; INTRAVENOUS; SOFT TISSUE at 17:50

## 2024-01-06 RX ADMIN — LISINOPRIL 20 MG: 20 TABLET ORAL at 09:03

## 2024-01-06 RX ADMIN — METOPROLOL SUCCINATE 25 MG: 25 TABLET, EXTENDED RELEASE ORAL at 16:57

## 2024-01-06 RX ADMIN — RANOLAZINE 500 MG: 500 TABLET, FILM COATED, EXTENDED RELEASE ORAL at 09:03

## 2024-01-06 RX ADMIN — DEXAMETHASONE SODIUM PHOSPHATE 8 MG: 4 INJECTION, SOLUTION INTRA-ARTICULAR; INTRALESIONAL; INTRAMUSCULAR; INTRAVENOUS; SOFT TISSUE at 12:46

## 2024-01-06 RX ADMIN — DEXAMETHASONE SODIUM PHOSPHATE 8 MG: 4 INJECTION, SOLUTION INTRA-ARTICULAR; INTRALESIONAL; INTRAMUSCULAR; INTRAVENOUS; SOFT TISSUE at 00:04

## 2024-01-06 RX ADMIN — HYDROCHLOROTHIAZIDE 25 MG: 25 TABLET ORAL at 09:02

## 2024-01-06 NOTE — PROGRESS NOTES
CARDIOLOGY CONSULTATION    REASON FOR CONSULT: Recent abnormal stress, Preop evaluation     REQUESTING PROVIDER: Dr. Phan    CHIEF COMPLAINT:  Weakness    HISTORY OF PRESENT ILLNESS:  Yusra Fontenot is a 68 y.o. year-old female with past medical history significant for hypertension, anxiety, angina who was evaluated today due to needing preoperative clearance for Cspine surgery. Patient initially presented to the ED with left-sided weakness. Patient states she had a fall about 2 weeks ago and had subsequent weakness on the left upper and lower extremities.  She also states she did have some right-sided lower extremity weakness.  States that she thought this was due to her fall and pain associated with weakness.  She went to her physical therapist and was told her weakness was not consistent pain related.  She was sent to her primary care physician, and after physical exam patient was sent to the emergency department. Head CT and MRI negative for stroke. MRI C-spine yesterday with multilevel severe cervical spinal stenosis as well as multilevel cord compression.       Today  Cath findings reviewed  Right radial site stable  She is being transferred to telemetry floor in other hospital    Records from hospital admission course thus far reviewed.      Telemetry reviewed.      INPATIENT MEDICATIONS:  Home medications reviewed.    Current Facility-Administered Medications:     sodium chloride flush 0.9 % injection 5-40 mL, 5-40 mL, IntraVENous, 2 times per day, Hemant Higginbotham MD, 10 mL at 01/06/24 0904    sodium chloride flush 0.9 % injection 5-40 mL, 5-40 mL, IntraVENous, PRN, Hemant Higginbotham MD    0.9 % sodium chloride infusion, , IntraVENous, PRN, Hemant Higginbotham MD    acetaminophen (TYLENOL) tablet 650 mg, 650 mg, Oral, Q4H PRN, Hemant Higginbotham MD    dexAMETHasone (DECADRON) injection 8 mg, 8 mg, IntraVENous, Q6H, Artie Phan MD, 8 mg at 01/06/24 1246    oxyCODONE (ROXICODONE) immediate  release tablet 5 mg, 5 mg, Oral, Q6H PRN, Dexter Mcneill MD, 5 mg at 01/05/24 1640    atorvastatin (LIPITOR) tablet 40 mg, 40 mg, Oral, Nightly, Dexter Mcneill MD, 40 mg at 01/05/24 2115    lisinopril (PRINIVIL;ZESTRIL) tablet 20 mg, 20 mg, Oral, Daily, Dexter Mcneill MD, 20 mg at 01/06/24 0903    hydroCHLOROthiazide (HYDRODIURIL) tablet 25 mg, 25 mg, Oral, Daily, Dexter Mcneill MD, 25 mg at 01/06/24 0902    ranolazine (RANEXA) extended release tablet 500 mg, 500 mg, Oral, BID, Dexter Mcneill MD, 500 mg at 01/06/24 0903    hydrOXYzine pamoate (VISTARIL) capsule 25 mg, 25 mg, Oral, TID PRN, Dexter Mcneill MD    sodium chloride flush 0.9 % injection 5-40 mL, 5-40 mL, IntraVENous, 2 times per day, Dexter Mcneill MD, 10 mL at 01/05/24 0936    sodium chloride flush 0.9 % injection 5-40 mL, 5-40 mL, IntraVENous, PRN, Dexter Mcneill MD    0.9 % sodium chloride infusion, , IntraVENous, PRN, Dexter Mcneill MD    ondansetron (ZOFRAN-ODT) disintegrating tablet 4 mg, 4 mg, Oral, Q8H PRN, 4 mg at 01/01/24 1647 **OR** ondansetron (ZOFRAN) injection 4 mg, 4 mg, IntraVENous, Q6H PRN, Dexter Mcneill MD    polyethylene glycol (GLYCOLAX) packet 17 g, 17 g, Oral, Daily PRN, Dexter Mcneill MD, 17 g at 01/03/24 1028    enoxaparin (LOVENOX) injection 40 mg, 40 mg, SubCUTAneous, Daily, Dexter Mcneill MD, 40 mg at 01/06/24 0902    labetalol (NORMODYNE;TRANDATE) injection 10 mg, 10 mg, IntraVENous, Q6H PRN, Dexter Mcneill MD     ALLERGIES:  Allergies reviewed with the patient,No Known Allergies .      FAMILY HISTORY:  Family history reviewed.        SOCIAL HISTORY:  Notable for former tobacco use, no heavy alcohol or illicit drug use.      REVIEW OF SYSTEMS:  Complete review of systems performed, pertinents noted above, all other systems are negative.    PHYSICAL EXAMINATION:    General:  Alert, oriented, in NAD  Cardiovascular:  RRR, No murmur. Right wrist still with splint from cardiac procedure  Respiratory:

## 2024-01-06 NOTE — DISCHARGE SUMMARY
Physician transfer summary     Patient ID:    Yusra Fontenot  [id number]  68 y.o.  1955    Admit date: 12/28/2023    Discharge date : 1/6/2024      Final Diagnoses:   Bilateral lower extremity and generalized weakness.   At this point, appears to be due to cervical myelopathy with MRI showing severe multilevel cervical spinal stenosis and cord compression     Essential hypertension     hyponatremia     Chronic kidney disease stage III     Hyperlipidemia    Noncritical 60% RCA stenosis.  To be managed medically         Reason for Hospitalization:  68-year-old female with past medical history of hypertension, anxiety, angina who presented to the emergency department with left-sided weakness.  Patient states she had a fall about 2 weeks previous and  had weakness on the left upper and lower extremities.  She also states she did have some right-sided lower extremity weakness.  States that she thought this was due to her fall and pain associated with weakness.  She went to her physical therapist and was told her weakness was not consistent pain related.  She was sent to her primary care physician after physical exam patient was sent to the emergency department.      In the ED, head CT was negative.       Hospital Course:   She was admitted for further workup.     MRI was negative for stroke     PT and OT recommending IRF     Due to persistent symptoms of weakness and numbness in both upper and lower extremities bilaterally, patient subsequently underwent MRI of C spine showed multilevel severe cervical spinal stenosis and multilevel cord compression with myelomalacia.   Neurosurgery was consulted and she has been accepted to [facility] for neurosurgical intervention     Cardiology was also consulted for preop cardiac risk assessment. She has a planned cardiac cath today. Prior CCTA showed 50% stenosis of mid to distal RCA, calcifications of distal left main and LAD. Echo showed EF 65-70%.     Patient

## 2024-01-07 LAB
ALBUMIN SERPL-MCNC: 3.2 G/DL (ref 3.5–5)
ALBUMIN/GLOB SERPL: 0.7 (ref 1.1–2.2)
ALP SERPL-CCNC: 76 U/L (ref 45–117)
ALT SERPL-CCNC: 27 U/L (ref 12–78)
ANION GAP SERPL CALC-SCNC: 6 MMOL/L (ref 5–15)
AST SERPL-CCNC: 21 U/L (ref 15–37)
BASOPHILS # BLD: 0 K/UL (ref 0–0.1)
BASOPHILS NFR BLD: 0 % (ref 0–1)
BILIRUB SERPL-MCNC: 0.4 MG/DL (ref 0.2–1)
BUN SERPL-MCNC: 34 MG/DL (ref 6–20)
BUN/CREAT SERPL: 27 (ref 12–20)
CALCIUM SERPL-MCNC: 9.1 MG/DL (ref 8.5–10.1)
CHLORIDE SERPL-SCNC: 106 MMOL/L (ref 97–108)
CO2 SERPL-SCNC: 26 MMOL/L (ref 21–32)
CREAT SERPL-MCNC: 1.25 MG/DL (ref 0.55–1.02)
DIFFERENTIAL METHOD BLD: ABNORMAL
EOSINOPHIL # BLD: 0 K/UL (ref 0–0.4)
EOSINOPHIL NFR BLD: 0 % (ref 0–7)
ERYTHROCYTE [DISTWIDTH] IN BLOOD BY AUTOMATED COUNT: 14.8 % (ref 11.5–14.5)
GLOBULIN SER CALC-MCNC: 4.3 G/DL (ref 2–4)
GLUCOSE SERPL-MCNC: 109 MG/DL (ref 65–100)
HCT VFR BLD AUTO: 36.8 % (ref 35–47)
HGB BLD-MCNC: 12.8 G/DL (ref 11.5–16)
IMM GRANULOCYTES # BLD AUTO: 0.1 K/UL (ref 0–0.04)
IMM GRANULOCYTES NFR BLD AUTO: 1 % (ref 0–0.5)
LYMPHOCYTES # BLD: 1.9 K/UL (ref 0.8–3.5)
LYMPHOCYTES NFR BLD: 15 % (ref 12–49)
MCH RBC QN AUTO: 27.9 PG (ref 26–34)
MCHC RBC AUTO-ENTMCNC: 34.8 G/DL (ref 30–36.5)
MCV RBC AUTO: 80.3 FL (ref 80–99)
MONOCYTES # BLD: 0.7 K/UL (ref 0–1)
MONOCYTES NFR BLD: 6 % (ref 5–13)
NEUTS SEG # BLD: 9.5 K/UL (ref 1.8–8)
NEUTS SEG NFR BLD: 78 % (ref 32–75)
NRBC # BLD: 0 K/UL (ref 0–0.01)
NRBC BLD-RTO: 0 PER 100 WBC
PLATELET # BLD AUTO: 305 K/UL (ref 150–400)
PMV BLD AUTO: 11 FL (ref 8.9–12.9)
POTASSIUM SERPL-SCNC: 4.4 MMOL/L (ref 3.5–5.1)
PROT SERPL-MCNC: 7.5 G/DL (ref 6.4–8.2)
RBC # BLD AUTO: 4.58 M/UL (ref 3.8–5.2)
SODIUM SERPL-SCNC: 138 MMOL/L (ref 136–145)
WBC # BLD AUTO: 12.2 K/UL (ref 3.6–11)

## 2024-01-07 PROCEDURE — 2580000003 HC RX 258: Performed by: INTERNAL MEDICINE

## 2024-01-07 PROCEDURE — 6370000000 HC RX 637 (ALT 250 FOR IP): Performed by: INTERNAL MEDICINE

## 2024-01-07 PROCEDURE — 80053 COMPREHEN METABOLIC PANEL: CPT

## 2024-01-07 PROCEDURE — 6360000002 HC RX W HCPCS: Performed by: INTERNAL MEDICINE

## 2024-01-07 PROCEDURE — 2580000003 HC RX 258: Performed by: STUDENT IN AN ORGANIZED HEALTH CARE EDUCATION/TRAINING PROGRAM

## 2024-01-07 PROCEDURE — 36415 COLL VENOUS BLD VENIPUNCTURE: CPT

## 2024-01-07 PROCEDURE — 85025 COMPLETE CBC W/AUTO DIFF WBC: CPT

## 2024-01-07 PROCEDURE — 6370000000 HC RX 637 (ALT 250 FOR IP): Performed by: STUDENT IN AN ORGANIZED HEALTH CARE EDUCATION/TRAINING PROGRAM

## 2024-01-07 PROCEDURE — 2060000000 HC ICU INTERMEDIATE R&B

## 2024-01-07 RX ADMIN — SODIUM CHLORIDE, PRESERVATIVE FREE 10 ML: 5 INJECTION INTRAVENOUS at 21:41

## 2024-01-07 RX ADMIN — SODIUM CHLORIDE, PRESERVATIVE FREE 10 ML: 5 INJECTION INTRAVENOUS at 00:00

## 2024-01-07 RX ADMIN — SODIUM CHLORIDE, PRESERVATIVE FREE 10 ML: 5 INJECTION INTRAVENOUS at 06:23

## 2024-01-07 RX ADMIN — SODIUM CHLORIDE: 9 INJECTION, SOLUTION INTRAVENOUS at 04:00

## 2024-01-07 RX ADMIN — RANOLAZINE 500 MG: 500 TABLET, FILM COATED, EXTENDED RELEASE ORAL at 10:16

## 2024-01-07 RX ADMIN — RANOLAZINE 500 MG: 500 TABLET, FILM COATED, EXTENDED RELEASE ORAL at 21:35

## 2024-01-07 RX ADMIN — METOPROLOL SUCCINATE 25 MG: 25 TABLET, EXTENDED RELEASE ORAL at 10:17

## 2024-01-07 RX ADMIN — LISINOPRIL 20 MG: 20 TABLET ORAL at 10:17

## 2024-01-07 RX ADMIN — SODIUM CHLORIDE, PRESERVATIVE FREE 10 ML: 5 INJECTION INTRAVENOUS at 10:18

## 2024-01-07 RX ADMIN — SODIUM CHLORIDE, PRESERVATIVE FREE 10 ML: 5 INJECTION INTRAVENOUS at 21:35

## 2024-01-07 RX ADMIN — ENOXAPARIN SODIUM 40 MG: 100 INJECTION SUBCUTANEOUS at 09:00

## 2024-01-07 RX ADMIN — HYDROCHLOROTHIAZIDE 25 MG: 25 TABLET ORAL at 10:17

## 2024-01-07 RX ADMIN — SODIUM CHLORIDE, PRESERVATIVE FREE 10 ML: 5 INJECTION INTRAVENOUS at 10:17

## 2024-01-07 RX ADMIN — ATORVASTATIN CALCIUM 40 MG: 40 TABLET, FILM COATED ORAL at 21:34

## 2024-01-07 NOTE — H&P
History & Physical    Primary Care Provider: Kavon Thurman MD  Source of Information: Patient and chart review    History of Presenting Illness:   Yusra Fontenot is a 68 y.o. female with history of hypertension depression and anxiety, GERD, dyslipidemia, CAD, cervical degenerative disc disease who was transferred from [facility] to [facility] for neurosurgical evaluation of cervical degenerative disc disease with cervical myelopathy.  Patient states she has had progressive weakness numbness and tingling in both her upper extremities but this has been more severe in her left upper extremity.  States she eventually developed difficulty opening her left hand which prompted her to seek emergency room care  She was evaluated an MRI of her cervical spine showed multilevel severe degenerative disc disease.  She was transferred to [facility] for neurosurgical evaluation.  The patient denies any fever, chills, chest or abdominal pain, nausea, vomiting, cough, congestion, recent illness, palpitations, or dysuria.     Review of Systems:  Pertinent items are noted in the History of Present Illness.     Past Medical History:   Diagnosis Date    Anxiety state, unspecified     Asthma     as a child till age 12    Depression     Diverticulosis     GERD (gastroesophageal reflux disease)     Hypercholesterolemia     Hypertension     Palmoplantar keratoderma       Past Surgical History:   Procedure Laterality Date    COLONOSCOPY      COLONOSCOPY      due 2015    TOTAL HIP ARTHROPLASTY       Prior to Admission medications    Medication Sig Start Date End Date Taking? Authorizing Provider   ranolazine (RANEXA) 500 MG extended release tablet Take 1 tablet by mouth 2 times daily 7/19/23   Provider, MD Verónica   hydrOXYzine pamoate (VISTARIL) 25 MG capsule Take 1 capsule by mouth 3 times daily as needed for Itching    Verónica Etienne MD   acetaminophen (TYLENOL) 500 MG tablet Take by

## 2024-01-07 NOTE — CARE COORDINATION
Care Management Initial Assessment       RUR: 14%  Readmission? Yes - Pt is transfer from [facility]   1st IM letter given? Yes    RADHA: Likely IPR - [facility] had accepted Pt and she was pending insurance auth with [employer] when transferred    Transport: BLS vs family     Assessment completed per chart review, Pt is transfer from [facility], initial assessment already completed.    ADLs: Independent   DME: gerber OWENS  PCP follow up: Dr Kavon Thurman   Previous Home Health: None  Previous Skilled Nursing Facility: None  Previous Inpatient Rehab: Pending process with [facility] ([place]?)  Insurance verified: [facility] Medicare insured  [facility]  Emergency Contact: Partner, Rickie Rodriguez [phone]    PT/OT previously recommending IPR for Pt at [facility], Pt was pending insurance auth when [facility].    CM will follow patient progress and assist as needed with RADHA plan.       01/07/24 1122   Service Assessment   Patient Orientation Alert and Oriented;Person;Place;Situation;Self   Cognition Alert   History Provided By Medical Record   Primary Caregiver Self   Support Systems Spouse/Significant Other   Patient's Healthcare Decision Maker is: Legal Next of Kin   PCP Verified by CM Yes   Last Visit to PCP Within last 3 months   Prior Functional Level Assistance with the following:;Mobility   Current Functional Level Assistance with the following:;Mobility   Can patient return to prior living arrangement Unknown at present   Ability to make needs known: Good   Family able to assist with home care needs: Yes   Would you like for me to discuss the discharge plan with any other family members/significant others, and if so, who? Yes   Financial Resources Medicare   Social/Functional History   Lives With Spouse   Type of Home House   Home Layout One level   Home Access Stairs to enter with rails   Entrance Stairs - Number of Steps 4   Bathroom Shower/Tub

## 2024-01-08 PROCEDURE — 2060000000 HC ICU INTERMEDIATE R&B

## 2024-01-08 PROCEDURE — 2580000003 HC RX 258: Performed by: INTERNAL MEDICINE

## 2024-01-08 PROCEDURE — 2580000003 HC RX 258: Performed by: STUDENT IN AN ORGANIZED HEALTH CARE EDUCATION/TRAINING PROGRAM

## 2024-01-08 PROCEDURE — APPNB30 APP NON BILLABLE TIME 0-30 MINS: Performed by: NURSE PRACTITIONER

## 2024-01-08 PROCEDURE — 6370000000 HC RX 637 (ALT 250 FOR IP): Performed by: STUDENT IN AN ORGANIZED HEALTH CARE EDUCATION/TRAINING PROGRAM

## 2024-01-08 RX ADMIN — SODIUM CHLORIDE, PRESERVATIVE FREE 5 ML: 5 INJECTION INTRAVENOUS at 09:26

## 2024-01-08 RX ADMIN — ATORVASTATIN CALCIUM 40 MG: 40 TABLET, FILM COATED ORAL at 21:46

## 2024-01-08 RX ADMIN — RANOLAZINE 500 MG: 500 TABLET, FILM COATED, EXTENDED RELEASE ORAL at 09:21

## 2024-01-08 RX ADMIN — SODIUM CHLORIDE, PRESERVATIVE FREE 10 ML: 5 INJECTION INTRAVENOUS at 21:48

## 2024-01-08 RX ADMIN — SODIUM CHLORIDE, PRESERVATIVE FREE 10 ML: 5 INJECTION INTRAVENOUS at 21:47

## 2024-01-08 RX ADMIN — RANOLAZINE 500 MG: 500 TABLET, FILM COATED, EXTENDED RELEASE ORAL at 21:46

## 2024-01-08 ASSESSMENT — PAIN SCALES - GENERAL
PAINLEVEL_OUTOF10: 0

## 2024-01-08 ASSESSMENT — PAIN SCALES - WONG BAKER
WONGBAKER_NUMERICALRESPONSE: 0

## 2024-01-09 ENCOUNTER — ANESTHESIA EVENT (OUTPATIENT)
Facility: HOSPITAL | Age: 69
End: 2024-01-09
Payer: MEDICARE

## 2024-01-09 ENCOUNTER — APPOINTMENT (OUTPATIENT)
Facility: HOSPITAL | Age: 69
DRG: 472 | End: 2024-01-09
Attending: HOSPITALIST
Payer: MEDICARE

## 2024-01-09 ENCOUNTER — ANESTHESIA (OUTPATIENT)
Facility: HOSPITAL | Age: 69
End: 2024-01-09
Payer: MEDICARE

## 2024-01-09 LAB
ABO + RH BLD: NORMAL
BLOOD GROUP ANTIBODIES SERPL: NORMAL
SPECIMEN EXP DATE BLD: NORMAL

## 2024-01-09 PROCEDURE — 2580000003 HC RX 258: Performed by: INTERNAL MEDICINE

## 2024-01-09 PROCEDURE — 86900 BLOOD TYPING SEROLOGIC ABO: CPT

## 2024-01-09 PROCEDURE — 6370000000 HC RX 637 (ALT 250 FOR IP): Performed by: STUDENT IN AN ORGANIZED HEALTH CARE EDUCATION/TRAINING PROGRAM

## 2024-01-09 PROCEDURE — 7100000001 HC PACU RECOVERY - ADDTL 15 MIN: Performed by: SPECIALIST

## 2024-01-09 PROCEDURE — 6360000002 HC RX W HCPCS: Performed by: ANESTHESIOLOGY

## 2024-01-09 PROCEDURE — 3700000001 HC ADD 15 MINUTES (ANESTHESIA): Performed by: SPECIALIST

## 2024-01-09 PROCEDURE — C1889 IMPLANT/INSERT DEVICE, NOC: HCPCS | Performed by: SPECIALIST

## 2024-01-09 PROCEDURE — 2709999900 HC NON-CHARGEABLE SUPPLY: Performed by: SPECIALIST

## 2024-01-09 PROCEDURE — 2580000003 HC RX 258: Performed by: NURSE ANESTHETIST, CERTIFIED REGISTERED

## 2024-01-09 PROCEDURE — 6360000002 HC RX W HCPCS: Performed by: SPECIALIST

## 2024-01-09 PROCEDURE — 00NW0ZZ RELEASE CERVICAL SPINAL CORD, OPEN APPROACH: ICD-10-PCS | Performed by: SPECIALIST

## 2024-01-09 PROCEDURE — C1713 ANCHOR/SCREW BN/BN,TIS/BN: HCPCS | Performed by: SPECIALIST

## 2024-01-09 PROCEDURE — 2580000003 HC RX 258: Performed by: ANESTHESIOLOGY

## 2024-01-09 PROCEDURE — 7100000000 HC PACU RECOVERY - FIRST 15 MIN: Performed by: SPECIALIST

## 2024-01-09 PROCEDURE — 2580000003 HC RX 258: Performed by: SPECIALIST

## 2024-01-09 PROCEDURE — 0RT30ZZ RESECTION OF CERVICAL VERTEBRAL DISC, OPEN APPROACH: ICD-10-PCS | Performed by: SPECIALIST

## 2024-01-09 PROCEDURE — 3700000000 HC ANESTHESIA ATTENDED CARE: Performed by: SPECIALIST

## 2024-01-09 PROCEDURE — 3600000014 HC SURGERY LEVEL 4 ADDTL 15MIN: Performed by: SPECIALIST

## 2024-01-09 PROCEDURE — 36415 COLL VENOUS BLD VENIPUNCTURE: CPT

## 2024-01-09 PROCEDURE — 3600000004 HC SURGERY LEVEL 4 BASE: Performed by: SPECIALIST

## 2024-01-09 PROCEDURE — 86901 BLOOD TYPING SEROLOGIC RH(D): CPT

## 2024-01-09 PROCEDURE — 6370000000 HC RX 637 (ALT 250 FOR IP): Performed by: INTERNAL MEDICINE

## 2024-01-09 PROCEDURE — L0180 CER POST COL OCC/MAN SUP ADJ: HCPCS | Performed by: SPECIALIST

## 2024-01-09 PROCEDURE — 2060000000 HC ICU INTERMEDIATE R&B

## 2024-01-09 PROCEDURE — 6370000000 HC RX 637 (ALT 250 FOR IP): Performed by: SPECIALIST

## 2024-01-09 PROCEDURE — 2720000010 HC SURG SUPPLY STERILE: Performed by: SPECIALIST

## 2024-01-09 PROCEDURE — 2500000003 HC RX 250 WO HCPCS: Performed by: NURSE ANESTHETIST, CERTIFIED REGISTERED

## 2024-01-09 PROCEDURE — 2580000003 HC RX 258: Performed by: STUDENT IN AN ORGANIZED HEALTH CARE EDUCATION/TRAINING PROGRAM

## 2024-01-09 PROCEDURE — 86850 RBC ANTIBODY SCREEN: CPT

## 2024-01-09 PROCEDURE — 01N10ZZ RELEASE CERVICAL NERVE, OPEN APPROACH: ICD-10-PCS | Performed by: SPECIALIST

## 2024-01-09 PROCEDURE — 2500000003 HC RX 250 WO HCPCS: Performed by: SPECIALIST

## 2024-01-09 PROCEDURE — 6360000002 HC RX W HCPCS: Performed by: NURSE ANESTHETIST, CERTIFIED REGISTERED

## 2024-01-09 PROCEDURE — 0RG20A0 FUSION OF 2 OR MORE CERVICAL VERTEBRAL JOINTS WITH INTERBODY FUSION DEVICE, ANTERIOR APPROACH, ANTERIOR COLUMN, OPEN APPROACH: ICD-10-PCS | Performed by: SPECIALIST

## 2024-01-09 DEVICE — SCREW 3120515 4.0 X 15 SELF DRILL VAR
Type: IMPLANTABLE DEVICE | Site: SPINE CERVICAL | Status: FUNCTIONAL
Brand: ATLANTIS® ANTERIOR CERVICAL PLATE SYSTEM

## 2024-01-09 DEVICE — INTERBODY FUSION DEVICE  6 DEGREE MEDIUM 7MM
Type: IMPLANTABLE DEVICE | Site: SPINE CERVICAL | Status: FUNCTIONAL
Brand: ENDOSKELETON® TC NANOLOCK® SURFACE TECHNOLOGY

## 2024-01-09 DEVICE — IMPLANTABLE DEVICE: Type: IMPLANTABLE DEVICE | Site: SPINE CERVICAL | Status: FUNCTIONAL

## 2024-01-09 DEVICE — BONE SCREW, LOCKING, 3.5MM X 16MM
Type: IMPLANTABLE DEVICE | Site: SPINE CERVICAL | Status: FUNCTIONAL
Brand: ENDOSKELETON® TCS

## 2024-01-09 DEVICE — BONE SCREW, LOCKING, 3.5MM X 14MM
Type: IMPLANTABLE DEVICE | Site: SPINE CERVICAL | Status: FUNCTIONAL
Brand: ENDOSKELETON® TCS

## 2024-01-09 DEVICE — INTERBODY FUSION DEVICE  6 DEGREE MEDIUM 7MM
Type: IMPLANTABLE DEVICE | Site: SPINE CERVICAL | Status: FUNCTIONAL
Brand: ENDOSKELETON® TCS NANOLOCK® SURFACE TECHNOLOGY

## 2024-01-09 RX ORDER — HYDROMORPHONE HYDROCHLORIDE 1 MG/ML
0.5 INJECTION, SOLUTION INTRAMUSCULAR; INTRAVENOUS; SUBCUTANEOUS EVERY 5 MIN PRN
Status: DISCONTINUED | OUTPATIENT
Start: 2024-01-09 | End: 2024-01-09 | Stop reason: HOSPADM

## 2024-01-09 RX ORDER — ONDANSETRON 2 MG/ML
4 INJECTION INTRAMUSCULAR; INTRAVENOUS EVERY 6 HOURS PRN
Status: DISCONTINUED | OUTPATIENT
Start: 2024-01-09 | End: 2024-01-10 | Stop reason: SDUPTHER

## 2024-01-09 RX ORDER — ACETAMINOPHEN 500 MG
1000 TABLET ORAL EVERY 6 HOURS
Status: DISCONTINUED | OUTPATIENT
Start: 2024-01-09 | End: 2024-01-18 | Stop reason: HOSPADM

## 2024-01-09 RX ORDER — SODIUM CHLORIDE 9 MG/ML
INJECTION, SOLUTION INTRAVENOUS PRN
Status: DISCONTINUED | OUTPATIENT
Start: 2024-01-09 | End: 2024-01-18 | Stop reason: HOSPADM

## 2024-01-09 RX ORDER — CYCLOBENZAPRINE HCL 10 MG
10 TABLET ORAL EVERY 12 HOURS PRN
Status: DISCONTINUED | OUTPATIENT
Start: 2024-01-09 | End: 2024-01-18 | Stop reason: HOSPADM

## 2024-01-09 RX ORDER — MORPHINE SULFATE 2 MG/ML
2 INJECTION, SOLUTION INTRAMUSCULAR; INTRAVENOUS
Status: DISCONTINUED | OUTPATIENT
Start: 2024-01-09 | End: 2024-01-18 | Stop reason: HOSPADM

## 2024-01-09 RX ORDER — MORPHINE SULFATE 4 MG/ML
4 INJECTION, SOLUTION INTRAMUSCULAR; INTRAVENOUS
Status: DISCONTINUED | OUTPATIENT
Start: 2024-01-09 | End: 2024-01-18 | Stop reason: HOSPADM

## 2024-01-09 RX ORDER — SODIUM CHLORIDE 0.9 % (FLUSH) 0.9 %
5-40 SYRINGE (ML) INJECTION PRN
Status: DISCONTINUED | OUTPATIENT
Start: 2024-01-09 | End: 2024-01-09 | Stop reason: HOSPADM

## 2024-01-09 RX ORDER — ONDANSETRON 4 MG/1
4 TABLET, ORALLY DISINTEGRATING ORAL EVERY 8 HOURS PRN
Status: DISCONTINUED | OUTPATIENT
Start: 2024-01-09 | End: 2024-01-18 | Stop reason: HOSPADM

## 2024-01-09 RX ORDER — SODIUM CHLORIDE 0.9 % (FLUSH) 0.9 %
5-40 SYRINGE (ML) INJECTION EVERY 12 HOURS SCHEDULED
Status: DISCONTINUED | OUTPATIENT
Start: 2024-01-09 | End: 2024-01-09 | Stop reason: HOSPADM

## 2024-01-09 RX ORDER — FENTANYL CITRATE 50 UG/ML
INJECTION, SOLUTION INTRAMUSCULAR; INTRAVENOUS PRN
Status: DISCONTINUED | OUTPATIENT
Start: 2024-01-09 | End: 2024-01-09 | Stop reason: SDUPTHER

## 2024-01-09 RX ORDER — DIAZEPAM 5 MG/1
5 TABLET ORAL EVERY 6 HOURS PRN
Status: DISCONTINUED | OUTPATIENT
Start: 2024-01-09 | End: 2024-01-18 | Stop reason: HOSPADM

## 2024-01-09 RX ORDER — PROCHLORPERAZINE EDISYLATE 5 MG/ML
5 INJECTION INTRAMUSCULAR; INTRAVENOUS
Status: DISCONTINUED | OUTPATIENT
Start: 2024-01-09 | End: 2024-01-09 | Stop reason: HOSPADM

## 2024-01-09 RX ORDER — SODIUM CHLORIDE 9 MG/ML
INJECTION, SOLUTION INTRAVENOUS CONTINUOUS
Status: DISCONTINUED | OUTPATIENT
Start: 2024-01-09 | End: 2024-01-09 | Stop reason: HOSPADM

## 2024-01-09 RX ORDER — DIPHENHYDRAMINE HCL 25 MG
25 CAPSULE ORAL EVERY 6 HOURS PRN
Status: DISCONTINUED | OUTPATIENT
Start: 2024-01-09 | End: 2024-01-18 | Stop reason: HOSPADM

## 2024-01-09 RX ORDER — MIDAZOLAM HYDROCHLORIDE 2 MG/2ML
2 INJECTION, SOLUTION INTRAMUSCULAR; INTRAVENOUS
Status: DISCONTINUED | OUTPATIENT
Start: 2024-01-09 | End: 2024-01-09 | Stop reason: HOSPADM

## 2024-01-09 RX ORDER — DEXAMETHASONE SODIUM PHOSPHATE 4 MG/ML
INJECTION, SOLUTION INTRA-ARTICULAR; INTRALESIONAL; INTRAMUSCULAR; INTRAVENOUS; SOFT TISSUE PRN
Status: DISCONTINUED | OUTPATIENT
Start: 2024-01-09 | End: 2024-01-09 | Stop reason: SDUPTHER

## 2024-01-09 RX ORDER — ONDANSETRON 2 MG/ML
INJECTION INTRAMUSCULAR; INTRAVENOUS PRN
Status: DISCONTINUED | OUTPATIENT
Start: 2024-01-09 | End: 2024-01-09 | Stop reason: SDUPTHER

## 2024-01-09 RX ORDER — SUCCINYLCHOLINE/SOD CL,ISO/PF 200MG/10ML
SYRINGE (ML) INTRAVENOUS PRN
Status: DISCONTINUED | OUTPATIENT
Start: 2024-01-09 | End: 2024-01-09 | Stop reason: SDUPTHER

## 2024-01-09 RX ORDER — MIDAZOLAM HYDROCHLORIDE 2 MG/2ML
2 INJECTION, SOLUTION INTRAMUSCULAR; INTRAVENOUS
Status: COMPLETED | OUTPATIENT
Start: 2024-01-09 | End: 2024-01-09

## 2024-01-09 RX ORDER — LIDOCAINE HYDROCHLORIDE 20 MG/ML
INJECTION, SOLUTION EPIDURAL; INFILTRATION; INTRACAUDAL; PERINEURAL PRN
Status: DISCONTINUED | OUTPATIENT
Start: 2024-01-09 | End: 2024-01-09 | Stop reason: SDUPTHER

## 2024-01-09 RX ORDER — SODIUM CHLORIDE 9 MG/ML
INJECTION, SOLUTION INTRAVENOUS CONTINUOUS
Status: DISPENSED | OUTPATIENT
Start: 2024-01-09 | End: 2024-01-12

## 2024-01-09 RX ORDER — DIPHENHYDRAMINE HYDROCHLORIDE 50 MG/ML
12.5 INJECTION INTRAMUSCULAR; INTRAVENOUS
Status: DISCONTINUED | OUTPATIENT
Start: 2024-01-09 | End: 2024-01-09 | Stop reason: HOSPADM

## 2024-01-09 RX ORDER — SODIUM CHLORIDE 0.9 % (FLUSH) 0.9 %
5-40 SYRINGE (ML) INJECTION PRN
Status: DISCONTINUED | OUTPATIENT
Start: 2024-01-09 | End: 2024-01-18 | Stop reason: HOSPADM

## 2024-01-09 RX ORDER — HYDROCODONE BITARTRATE AND ACETAMINOPHEN 5; 325 MG/1; MG/1
2 TABLET ORAL EVERY 6 HOURS PRN
Status: DISCONTINUED | OUTPATIENT
Start: 2024-01-09 | End: 2024-01-18 | Stop reason: HOSPADM

## 2024-01-09 RX ORDER — SODIUM CHLORIDE, SODIUM LACTATE, POTASSIUM CHLORIDE, CALCIUM CHLORIDE 600; 310; 30; 20 MG/100ML; MG/100ML; MG/100ML; MG/100ML
INJECTION, SOLUTION INTRAVENOUS CONTINUOUS
Status: DISCONTINUED | OUTPATIENT
Start: 2024-01-09 | End: 2024-01-09 | Stop reason: HOSPADM

## 2024-01-09 RX ORDER — SODIUM CHLORIDE, SODIUM LACTATE, POTASSIUM CHLORIDE, CALCIUM CHLORIDE 600; 310; 30; 20 MG/100ML; MG/100ML; MG/100ML; MG/100ML
INJECTION, SOLUTION INTRAVENOUS CONTINUOUS
Status: DISCONTINUED | OUTPATIENT
Start: 2024-01-09 | End: 2024-01-10

## 2024-01-09 RX ORDER — FENTANYL CITRATE 50 UG/ML
50 INJECTION, SOLUTION INTRAMUSCULAR; INTRAVENOUS EVERY 5 MIN PRN
Status: DISCONTINUED | OUTPATIENT
Start: 2024-01-09 | End: 2024-01-09 | Stop reason: HOSPADM

## 2024-01-09 RX ORDER — MIDAZOLAM HYDROCHLORIDE 1 MG/ML
INJECTION INTRAMUSCULAR; INTRAVENOUS PRN
Status: DISCONTINUED | OUTPATIENT
Start: 2024-01-09 | End: 2024-01-09 | Stop reason: SDUPTHER

## 2024-01-09 RX ORDER — DIPHENHYDRAMINE HYDROCHLORIDE 50 MG/ML
25 INJECTION INTRAMUSCULAR; INTRAVENOUS EVERY 6 HOURS PRN
Status: DISCONTINUED | OUTPATIENT
Start: 2024-01-09 | End: 2024-01-18 | Stop reason: HOSPADM

## 2024-01-09 RX ORDER — ROCURONIUM BROMIDE 10 MG/ML
INJECTION, SOLUTION INTRAVENOUS PRN
Status: DISCONTINUED | OUTPATIENT
Start: 2024-01-09 | End: 2024-01-09 | Stop reason: SDUPTHER

## 2024-01-09 RX ORDER — SODIUM CHLORIDE 9 MG/ML
INJECTION, SOLUTION INTRAVENOUS PRN
Status: DISCONTINUED | OUTPATIENT
Start: 2024-01-09 | End: 2024-01-09 | Stop reason: HOSPADM

## 2024-01-09 RX ORDER — CEFAZOLIN SODIUM 1 G/3ML
INJECTION, POWDER, FOR SOLUTION INTRAMUSCULAR; INTRAVENOUS PRN
Status: DISCONTINUED | OUTPATIENT
Start: 2024-01-09 | End: 2024-01-09 | Stop reason: SDUPTHER

## 2024-01-09 RX ORDER — HYDROCODONE BITARTRATE AND ACETAMINOPHEN 5; 325 MG/1; MG/1
1 TABLET ORAL EVERY 6 HOURS PRN
Status: DISCONTINUED | OUTPATIENT
Start: 2024-01-09 | End: 2024-01-18 | Stop reason: HOSPADM

## 2024-01-09 RX ORDER — EPHEDRINE SULFATE 50 MG/ML
INJECTION INTRAVENOUS PRN
Status: DISCONTINUED | OUTPATIENT
Start: 2024-01-09 | End: 2024-01-09 | Stop reason: SDUPTHER

## 2024-01-09 RX ORDER — FENTANYL CITRATE 50 UG/ML
100 INJECTION, SOLUTION INTRAMUSCULAR; INTRAVENOUS
Status: DISCONTINUED | OUTPATIENT
Start: 2024-01-09 | End: 2024-01-09 | Stop reason: HOSPADM

## 2024-01-09 RX ORDER — SODIUM CHLORIDE 0.9 % (FLUSH) 0.9 %
5-40 SYRINGE (ML) INJECTION EVERY 12 HOURS SCHEDULED
Status: DISCONTINUED | OUTPATIENT
Start: 2024-01-09 | End: 2024-01-18 | Stop reason: HOSPADM

## 2024-01-09 RX ORDER — ONDANSETRON 2 MG/ML
4 INJECTION INTRAMUSCULAR; INTRAVENOUS
Status: DISCONTINUED | OUTPATIENT
Start: 2024-01-09 | End: 2024-01-09 | Stop reason: HOSPADM

## 2024-01-09 RX ORDER — ONDANSETRON 2 MG/ML
4 INJECTION INTRAMUSCULAR; INTRAVENOUS ONCE
Status: DISCONTINUED | OUTPATIENT
Start: 2024-01-09 | End: 2024-01-09 | Stop reason: HOSPADM

## 2024-01-09 RX ORDER — HYDRALAZINE HYDROCHLORIDE 20 MG/ML
10 INJECTION INTRAMUSCULAR; INTRAVENOUS
Status: DISCONTINUED | OUTPATIENT
Start: 2024-01-09 | End: 2024-01-09 | Stop reason: HOSPADM

## 2024-01-09 RX ORDER — BISACODYL 10 MG
10 SUPPOSITORY, RECTAL RECTAL DAILY PRN
Status: DISCONTINUED | OUTPATIENT
Start: 2024-01-09 | End: 2024-01-18 | Stop reason: HOSPADM

## 2024-01-09 RX ORDER — PROPOFOL 10 MG/ML
INJECTION, EMULSION INTRAVENOUS PRN
Status: DISCONTINUED | OUTPATIENT
Start: 2024-01-09 | End: 2024-01-09 | Stop reason: SDUPTHER

## 2024-01-09 RX ADMIN — SODIUM CHLORIDE, PRESERVATIVE FREE 5 ML: 5 INJECTION INTRAVENOUS at 10:00

## 2024-01-09 RX ADMIN — EPHEDRINE SULFATE 10 MG: 50 INJECTION INTRAVENOUS at 16:40

## 2024-01-09 RX ADMIN — SODIUM CHLORIDE, PRESERVATIVE FREE 10 ML: 5 INJECTION INTRAVENOUS at 21:51

## 2024-01-09 RX ADMIN — SODIUM CHLORIDE, PRESERVATIVE FREE 10 ML: 5 INJECTION INTRAVENOUS at 21:50

## 2024-01-09 RX ADMIN — SUGAMMADEX 200 MG: 100 INJECTION, SOLUTION INTRAVENOUS at 16:51

## 2024-01-09 RX ADMIN — FENTANYL CITRATE 50 MCG: 50 INJECTION, SOLUTION INTRAMUSCULAR; INTRAVENOUS at 13:43

## 2024-01-09 RX ADMIN — ROCURONIUM BROMIDE 10 MG: 10 INJECTION, SOLUTION INTRAVENOUS at 15:15

## 2024-01-09 RX ADMIN — HYDROMORPHONE HYDROCHLORIDE 1 MG: 1 INJECTION, SOLUTION INTRAMUSCULAR; INTRAVENOUS; SUBCUTANEOUS at 15:15

## 2024-01-09 RX ADMIN — ROCURONIUM BROMIDE 40 MG: 10 INJECTION, SOLUTION INTRAVENOUS at 13:55

## 2024-01-09 RX ADMIN — FENTANYL CITRATE 50 MCG: 50 INJECTION, SOLUTION INTRAMUSCULAR; INTRAVENOUS at 16:47

## 2024-01-09 RX ADMIN — SODIUM CHLORIDE, POTASSIUM CHLORIDE, SODIUM LACTATE AND CALCIUM CHLORIDE: 600; 310; 30; 20 INJECTION, SOLUTION INTRAVENOUS at 13:10

## 2024-01-09 RX ADMIN — RANOLAZINE 500 MG: 500 TABLET, FILM COATED, EXTENDED RELEASE ORAL at 21:48

## 2024-01-09 RX ADMIN — SODIUM CHLORIDE, POTASSIUM CHLORIDE, SODIUM LACTATE AND CALCIUM CHLORIDE: 600; 310; 30; 20 INJECTION, SOLUTION INTRAVENOUS at 16:44

## 2024-01-09 RX ADMIN — LIDOCAINE HYDROCHLORIDE 50 MG: 20 INJECTION, SOLUTION EPIDURAL; INFILTRATION; INTRACAUDAL; PERINEURAL at 13:43

## 2024-01-09 RX ADMIN — RANOLAZINE 500 MG: 500 TABLET, FILM COATED, EXTENDED RELEASE ORAL at 10:04

## 2024-01-09 RX ADMIN — ACETAMINOPHEN 1000 MG: 325 TABLET ORAL at 21:48

## 2024-01-09 RX ADMIN — Medication 100 MG: at 13:44

## 2024-01-09 RX ADMIN — ATORVASTATIN CALCIUM 40 MG: 40 TABLET, FILM COATED ORAL at 21:48

## 2024-01-09 RX ADMIN — MIDAZOLAM HYDROCHLORIDE 1 MG: 1 INJECTION, SOLUTION INTRAMUSCULAR; INTRAVENOUS at 17:29

## 2024-01-09 RX ADMIN — PHENYLEPHRINE HYDROCHLORIDE 25 MCG/MIN: 10 INJECTION INTRAVENOUS at 14:12

## 2024-01-09 RX ADMIN — METOPROLOL SUCCINATE 25 MG: 25 TABLET, EXTENDED RELEASE ORAL at 09:58

## 2024-01-09 RX ADMIN — ROCURONIUM BROMIDE 10 MG: 10 INJECTION, SOLUTION INTRAVENOUS at 13:43

## 2024-01-09 RX ADMIN — DIAZEPAM 5 MG: 5 TABLET ORAL at 21:56

## 2024-01-09 RX ADMIN — WATER 2000 MG: 1 INJECTION INTRAMUSCULAR; INTRAVENOUS; SUBCUTANEOUS at 21:47

## 2024-01-09 RX ADMIN — SODIUM CHLORIDE, PRESERVATIVE FREE 10 ML: 5 INJECTION INTRAVENOUS at 21:48

## 2024-01-09 RX ADMIN — DEXAMETHASONE SODIUM PHOSPHATE 4 MG: 4 INJECTION INTRA-ARTICULAR; INTRALESIONAL; INTRAMUSCULAR; INTRAVENOUS; SOFT TISSUE at 13:55

## 2024-01-09 RX ADMIN — CEFAZOLIN 2 G: 1 INJECTION, POWDER, FOR SOLUTION INTRAMUSCULAR; INTRAVENOUS at 14:05

## 2024-01-09 RX ADMIN — MIDAZOLAM 2 MG: 1 INJECTION INTRAMUSCULAR; INTRAVENOUS at 13:29

## 2024-01-09 RX ADMIN — SODIUM CHLORIDE, PRESERVATIVE FREE 10 ML: 5 INJECTION INTRAVENOUS at 09:59

## 2024-01-09 RX ADMIN — ONDANSETRON 4 MG: 2 INJECTION INTRAMUSCULAR; INTRAVENOUS at 13:55

## 2024-01-09 RX ADMIN — SODIUM CHLORIDE: 9 INJECTION, SOLUTION INTRAVENOUS at 21:51

## 2024-01-09 RX ADMIN — PROPOFOL 120 MG: 10 INJECTION, EMULSION INTRAVENOUS at 13:43

## 2024-01-09 ASSESSMENT — PAIN DESCRIPTION - LOCATION: LOCATION: GENERALIZED

## 2024-01-09 ASSESSMENT — PAIN SCALES - GENERAL
PAINLEVEL_OUTOF10: 0
PAINLEVEL_OUTOF10: 3
PAINLEVEL_OUTOF10: 2

## 2024-01-09 ASSESSMENT — PAIN SCALES - WONG BAKER: WONGBAKER_NUMERICALRESPONSE: 0

## 2024-01-09 NOTE — OP NOTE
[unfilled]     Yusra B  [id number]  [id number]    1955  68 y.o.  female       Operative Report    Date of Surgery: 1/9/2024      Preoperative Diagnosis: Cervical spinal stenosis [M48.02]  Myelopathy (HCC) [G95.9]     Postoperative Diagnosis: * No post-op diagnosis entered *     Surgeon(s) and Role:     * Audie Fallon MD - Primary    Anesthesia: General     Procedure:   1. Anterior cervical discectomy with spinal cord and nerve root decompression at C3-C4, C4-C5, and C5-C6.  2. Anterior arthrodesis with   Medtronic Titan titanium cage C3-C4, C4-C5, and C5-C6.  3. Anterior cervical plating, C4-C5 and C5-C6 with Medtronic   Translational plate\  4. Insertion titanium cage x3  5. Microscope    2 grams of ancef were given prior to the incision and an order was written to stop it within 24 hours. SCD's were used throughout the procedure      Procedure in Detail:   After appropriate informed consent was obtained, the patient was taken to the operating suite and placed in the supine position on the operating table on the padded doughnut. Satisfactory anesthesia was induced. All pressure points were carefully padded. Perioperative antibiotics were given. The anterior neck was clipped, prepped, and draped in the usual sterile fashion. Intraoperative fluoroscopy was used to localize the C3-C4 level.  A transverse linear incision was made in the right anterior neck. Dissection was carried down through the subcutaneous tissue. The platysma was divided sharply in a transverse fashion and was undermined both superiorly and inferiorly. Dissection was carried down along the anterior border of the sternocleidomastoid muscle. The carotid artery was palpated and was swept laterally. A plane was identified into the prevertebral space and was developed both superiorly and inferiorly using blunt dissection. The hand-held esophageal retractor was placed in the wound. The midline was marked on the anterior aspect

## 2024-01-09 NOTE — ANESTHESIA PRE PROCEDURE
01/07/2024 07:08 AM       CMP:   Lab Results   Component Value Date/Time     01/07/2024 07:08 AM    K 4.4 01/07/2024 07:08 AM     01/07/2024 07:08 AM    CO2 26 01/07/2024 07:08 AM    BUN 34 01/07/2024 07:08 AM    CREATININE 1.25 01/07/2024 07:08 AM    GFRAA 58 08/16/2022 08:17 AM    AGRATIO 0.8 08/16/2022 08:17 AM    LABGLOM 46 01/07/2024 07:08 AM    GLUCOSE 109 01/07/2024 07:08 AM    PROT 7.5 01/07/2024 07:08 AM    CALCIUM 9.1 01/07/2024 07:08 AM    BILITOT 0.4 01/07/2024 07:08 AM    ALKPHOS 76 01/07/2024 07:08 AM    ALKPHOS 93 08/16/2022 08:17 AM    AST 21 01/07/2024 07:08 AM    ALT 27 01/07/2024 07:08 AM       POC Tests: No results for input(s): \"POCGLU\", \"POCNA\", \"POCK\", \"POCCL\", \"POCBUN\", \"POCHEMO\", \"POCHCT\" in the last 72 hours.    Coags:   Lab Results   Component Value Date/Time    PROTIME 13.7 12/28/2023 01:42 PM    INR 1.0 12/28/2023 01:42 PM       HCG (If Applicable): No results found for: \"PREGTESTUR\", \"PREGSERUM\", \"HCG\", \"HCGQUANT\"     ABGs: No results found for: \"PHART\", \"PO2ART\", \"GTF1IHS\", \"CCR5FHU\", \"BEART\", \"L6HXEZAG\"     Type & Screen (If Applicable):  No results found for: \"LABABO\", \"LABRH\"    Drug/Infectious Status (If Applicable):  Lab Results   Component Value Date/Time    HEPCAB <0.1 03/12/2019 09:29 AM       COVID-19 Screening (If Applicable): No results found for: \"COVID19\"        Anesthesia Evaluation  Patient summary reviewed and Nursing notes reviewed   no history of anesthetic complications:   Airway: Mallampati: II  TM distance: >3 FB   Neck ROM: full  Mouth opening: > = 3 FB   Dental:    (+) partials      Pulmonary: breath sounds clear to auscultation  (+)           asthma:                           ROS comment: childhood   Cardiovascular:  Exercise tolerance: poor (<4 METS)  (+) hypertension:, hyperlipidemia        Rhythm: regular  Rate: normal                 ROS comment: Non operative LM disease, normal EF     Neuro/Psych:   (+) neuromuscular disease:,

## 2024-01-09 NOTE — ANESTHESIA POSTPROCEDURE EVALUATION
Department of Anesthesiology  Postprocedure Note    Patient: Yusra Fontenot  MRN: [id number]  YOB: 1955  Date of evaluation: 1/9/2024    Procedure Summary     Date: 01/09/24 Room / Location: [facility] MAIN OR  / [facility] MAIN OR    Anesthesia Start: 1329 Anesthesia Stop: 1717    Procedure: C3-4, C4-5, C5-6 ANTERIOR CERVICAL DISECTOMY AND FUSION (Spine Cervical) Diagnosis:       Cervical spinal stenosis      Myelopathy (HCC)      (Cervical spinal stenosis [M48.02])      (Myelopathy (HCC) [G95.9])    Providers: Audie Fallon MD Responsible Provider: Zen Martin DO    Anesthesia Type: General ASA Status: 3          Anesthesia Type: General    No Phase I:      No Phase II:      Anesthesia Post Evaluation    Patient location during evaluation: PACU  Patient participation: complete - patient participated  Level of consciousness: awake  Pain score: 0  Airway patency: patent  Nausea & Vomiting: no nausea  Cardiovascular status: hemodynamically stable  Respiratory status: acceptable  Hydration status: euvolemic  Pain management: adequate        No notable events documented.

## 2024-01-09 NOTE — FLOWSHEET NOTE
01/09/24 1417   Family Communication    Relationship to Patient Sister    Phone Number Dalila [phone]   Family/Significant Other Update Called;Updated   Delivery Origin Nurse   Message Disposition Family present - message delivered   Update Given Yes   Family Communication   Family Update Message Procedure started;Will update in 1-2 hours

## 2024-01-09 NOTE — PERIOP NOTE
TRANSFER - IN REPORT:    Verbal report received from Momo(name) on Yusra Fontenot  being received from 6S(unit) for ordered procedure      Report consisted of patient’s Situation, Background, Assessment and   Recommendations(SBAR).     Information from the following report(s) Pre Procedure Checklist was reviewed with the receiving nurse.    Opportunity for questions and clarification was provided.

## 2024-01-10 LAB
ALBUMIN SERPL-MCNC: 3 G/DL (ref 3.5–5)
ANION GAP SERPL CALC-SCNC: 8 MMOL/L (ref 5–15)
BASOPHILS # BLD: 0 K/UL (ref 0–0.1)
BASOPHILS NFR BLD: 0 % (ref 0–1)
BUN SERPL-MCNC: 23 MG/DL (ref 6–20)
BUN/CREAT SERPL: 20 (ref 12–20)
CALCIUM SERPL-MCNC: 8.8 MG/DL (ref 8.5–10.1)
CHLORIDE SERPL-SCNC: 105 MMOL/L (ref 97–108)
CO2 SERPL-SCNC: 25 MMOL/L (ref 21–32)
CREAT SERPL-MCNC: 1.17 MG/DL (ref 0.55–1.02)
DIFFERENTIAL METHOD BLD: ABNORMAL
EOSINOPHIL # BLD: 0 K/UL (ref 0–0.4)
EOSINOPHIL NFR BLD: 0 % (ref 0–7)
ERYTHROCYTE [DISTWIDTH] IN BLOOD BY AUTOMATED COUNT: 14.5 % (ref 11.5–14.5)
GLUCOSE SERPL-MCNC: 136 MG/DL (ref 65–100)
HCT VFR BLD AUTO: 38.8 % (ref 35–47)
HGB BLD-MCNC: 13 G/DL (ref 11.5–16)
IMM GRANULOCYTES # BLD AUTO: 0.1 K/UL (ref 0–0.04)
IMM GRANULOCYTES NFR BLD AUTO: 1 % (ref 0–0.5)
LYMPHOCYTES # BLD: 1.7 K/UL (ref 0.8–3.5)
LYMPHOCYTES NFR BLD: 13 % (ref 12–49)
MAGNESIUM SERPL-MCNC: 2 MG/DL (ref 1.6–2.4)
MCH RBC QN AUTO: 27.3 PG (ref 26–34)
MCHC RBC AUTO-ENTMCNC: 33.5 G/DL (ref 30–36.5)
MCV RBC AUTO: 81.5 FL (ref 80–99)
MONOCYTES # BLD: 0.8 K/UL (ref 0–1)
MONOCYTES NFR BLD: 6 % (ref 5–13)
NEUTS SEG # BLD: 10.3 K/UL (ref 1.8–8)
NEUTS SEG NFR BLD: 80 % (ref 32–75)
NRBC # BLD: 0 K/UL (ref 0–0.01)
NRBC BLD-RTO: 0 PER 100 WBC
PHOSPHATE SERPL-MCNC: 4.5 MG/DL (ref 2.6–4.7)
PLATELET # BLD AUTO: 269 K/UL (ref 150–400)
PMV BLD AUTO: 10.2 FL (ref 8.9–12.9)
POTASSIUM SERPL-SCNC: 4.6 MMOL/L (ref 3.5–5.1)
RBC # BLD AUTO: 4.76 M/UL (ref 3.8–5.2)
SODIUM SERPL-SCNC: 138 MMOL/L (ref 136–145)
WBC # BLD AUTO: 12.8 K/UL (ref 3.6–11)

## 2024-01-10 PROCEDURE — 97166 OT EVAL MOD COMPLEX 45 MIN: CPT

## 2024-01-10 PROCEDURE — 36415 COLL VENOUS BLD VENIPUNCTURE: CPT

## 2024-01-10 PROCEDURE — 99024 POSTOP FOLLOW-UP VISIT: CPT | Performed by: PHYSICIAN ASSISTANT

## 2024-01-10 PROCEDURE — 6360000002 HC RX W HCPCS: Performed by: SPECIALIST

## 2024-01-10 PROCEDURE — 2060000000 HC ICU INTERMEDIATE R&B

## 2024-01-10 PROCEDURE — 6370000000 HC RX 637 (ALT 250 FOR IP): Performed by: SPECIALIST

## 2024-01-10 PROCEDURE — 97530 THERAPEUTIC ACTIVITIES: CPT

## 2024-01-10 PROCEDURE — 97535 SELF CARE MNGMENT TRAINING: CPT

## 2024-01-10 PROCEDURE — 2580000003 HC RX 258: Performed by: STUDENT IN AN ORGANIZED HEALTH CARE EDUCATION/TRAINING PROGRAM

## 2024-01-10 PROCEDURE — 97116 GAIT TRAINING THERAPY: CPT

## 2024-01-10 PROCEDURE — 2580000003 HC RX 258: Performed by: SPECIALIST

## 2024-01-10 PROCEDURE — 80069 RENAL FUNCTION PANEL: CPT

## 2024-01-10 PROCEDURE — 83735 ASSAY OF MAGNESIUM: CPT

## 2024-01-10 PROCEDURE — 6370000000 HC RX 637 (ALT 250 FOR IP): Performed by: STUDENT IN AN ORGANIZED HEALTH CARE EDUCATION/TRAINING PROGRAM

## 2024-01-10 PROCEDURE — 97161 PT EVAL LOW COMPLEX 20 MIN: CPT

## 2024-01-10 PROCEDURE — 2580000003 HC RX 258: Performed by: INTERNAL MEDICINE

## 2024-01-10 PROCEDURE — 85025 COMPLETE CBC W/AUTO DIFF WBC: CPT

## 2024-01-10 RX ADMIN — RANOLAZINE 500 MG: 500 TABLET, FILM COATED, EXTENDED RELEASE ORAL at 08:41

## 2024-01-10 RX ADMIN — SODIUM CHLORIDE: 9 INJECTION, SOLUTION INTRAVENOUS at 04:28

## 2024-01-10 RX ADMIN — DIAZEPAM 5 MG: 5 TABLET ORAL at 15:11

## 2024-01-10 RX ADMIN — ACETAMINOPHEN 1000 MG: 325 TABLET ORAL at 22:05

## 2024-01-10 RX ADMIN — SODIUM CHLORIDE, PRESERVATIVE FREE 10 ML: 5 INJECTION INTRAVENOUS at 08:42

## 2024-01-10 RX ADMIN — ACETAMINOPHEN 1000 MG: 325 TABLET ORAL at 08:41

## 2024-01-10 RX ADMIN — SODIUM CHLORIDE, PRESERVATIVE FREE 10 ML: 5 INJECTION INTRAVENOUS at 21:03

## 2024-01-10 RX ADMIN — RANOLAZINE 500 MG: 500 TABLET, FILM COATED, EXTENDED RELEASE ORAL at 21:02

## 2024-01-10 RX ADMIN — SODIUM CHLORIDE, PRESERVATIVE FREE 10 ML: 5 INJECTION INTRAVENOUS at 08:44

## 2024-01-10 RX ADMIN — SODIUM CHLORIDE, PRESERVATIVE FREE 10 ML: 5 INJECTION INTRAVENOUS at 21:05

## 2024-01-10 RX ADMIN — SODIUM CHLORIDE, PRESERVATIVE FREE 10 ML: 5 INJECTION INTRAVENOUS at 21:04

## 2024-01-10 RX ADMIN — DIAZEPAM 5 MG: 5 TABLET ORAL at 04:28

## 2024-01-10 RX ADMIN — ACETAMINOPHEN 1000 MG: 325 TABLET ORAL at 02:03

## 2024-01-10 RX ADMIN — ATORVASTATIN CALCIUM 40 MG: 40 TABLET, FILM COATED ORAL at 21:03

## 2024-01-10 RX ADMIN — ACETAMINOPHEN 1000 MG: 325 TABLET ORAL at 15:11

## 2024-01-10 RX ADMIN — SODIUM CHLORIDE: 9 INJECTION, SOLUTION INTRAVENOUS at 12:28

## 2024-01-10 RX ADMIN — DIAZEPAM 5 MG: 5 TABLET ORAL at 22:05

## 2024-01-10 RX ADMIN — SODIUM CHLORIDE: 9 INJECTION, SOLUTION INTRAVENOUS at 21:03

## 2024-01-10 RX ADMIN — WATER 2000 MG: 1 INJECTION INTRAMUSCULAR; INTRAVENOUS; SUBCUTANEOUS at 04:31

## 2024-01-10 ASSESSMENT — PAIN SCALES - WONG BAKER: WONGBAKER_NUMERICALRESPONSE: 0

## 2024-01-10 ASSESSMENT — PAIN SCALES - GENERAL: PAINLEVEL_OUTOF10: 2

## 2024-01-10 NOTE — DISCHARGE INSTRUCTIONS
12 tablets in 24 hours).  DO NOT:  -Do not give your medicine to others   -Do not take medicine unless it was prescribed for you   -Do not stop taking your medicine without talking to your healthcare provider   -Do not break, chew, crush, dissolve, or inject your medicine. If you cannot  swallow your medicine whole, talk to your healthcare provider.  -Do not drink alcohol while taking this medicine  -Do not take anti-inflammatory medications or aspirin unless instructed by your   physician.

## 2024-01-10 NOTE — CARE COORDINATION
Transition of Care Plan:    IPR - [facility]; Pt denied by [employer]; appeal to be submitted 1/10 by [facility]    Liaison: Aby [phone]    Transport: S    RUR: 18%  Prior Level of Functioning: Independent  Disposition: IPR  If SNF or IPR: Date [facility] offered: [facility]  Date FOC received:   Accepting facility: [facility]   Date authorization started with reference number: 1/1; Pt denied 1/10 - appeal submitted  Date authorization received and expires:   Follow up appointments: PCP, Neurosurgery   DME needed: defer to IPR  Transportation at discharge: S  IM/[facility] Medicare/ letter given: 1/6  Caregiver Contact: PartnerRickie [phone]   Discharge Caregiver contacted prior to discharge?   Care Conference needed? No  Barriers to discharge: Medical, Neurosurgery following; Placement - IPR denied 1/10 - appeal submitted by [facility] same day.    CM spoke with [facility] liaisonAby, Pt has been denied IPR by [employer]. They are not able to re-submit auth even though Pt has now had procedure. Appeal must be filed. CM obtained \"Appointment of Representative\" form, discussed with Pt/signed and returned to [facility] so that appeal may be filed today.     CM plan to discuss back up plan with therapy team - SNF vs home health.     LEEANNE Cerrato (Ally).

## 2024-01-10 NOTE — PERIOP NOTE
TRANSFER - OUT REPORT:    Verbal report given to Chuchomao (name) on Yusra Fontenot  being transferred to 8 (unit) for routine post-op       Report consisted of patient’s Situation, Background, Assessment and   Recommendations(SBAR).     Time Pre op antibiotic given:1405  Anesthesia Stop time: 1720    Information from the following report(s) SBAR, OR Summary, Procedure Summary, Intake/Output, MAR, and Cardiac Rhythm SR  was reviewed with the receiving nurse.    Opportunity for questions and clarification was provided.     Is the patient on 02? No       L/Min RA       Other N/A    Is the patient on a monitor? Yes    Is the nurse transporting with the patient? No    Surgical Waiting Area notified of patient's transfer from PACU? Yes. No family checked-in.  Called 2 numbers from her POC. No Answers, went to voice mail.

## 2024-01-11 LAB
ALBUMIN SERPL-MCNC: 2.4 G/DL (ref 3.5–5)
ANION GAP SERPL CALC-SCNC: 6 MMOL/L (ref 5–15)
BASOPHILS # BLD: 0 K/UL (ref 0–0.1)
BASOPHILS NFR BLD: 0 % (ref 0–1)
BUN SERPL-MCNC: 16 MG/DL (ref 6–20)
BUN/CREAT SERPL: 17 (ref 12–20)
CALCIUM SERPL-MCNC: 7.8 MG/DL (ref 8.5–10.1)
CHLORIDE SERPL-SCNC: 111 MMOL/L (ref 97–108)
CO2 SERPL-SCNC: 23 MMOL/L (ref 21–32)
CREAT SERPL-MCNC: 0.95 MG/DL (ref 0.55–1.02)
DIFFERENTIAL METHOD BLD: ABNORMAL
EOSINOPHIL # BLD: 0.2 K/UL (ref 0–0.4)
EOSINOPHIL NFR BLD: 2 % (ref 0–7)
ERYTHROCYTE [DISTWIDTH] IN BLOOD BY AUTOMATED COUNT: 15 % (ref 11.5–14.5)
FOLATE SERPL-MCNC: 5.4 NG/ML (ref 5–21)
GLUCOSE SERPL-MCNC: 104 MG/DL (ref 65–100)
HCT VFR BLD AUTO: 34 % (ref 35–47)
HGB BLD-MCNC: 11.5 G/DL (ref 11.5–16)
IMM GRANULOCYTES # BLD AUTO: 0.1 K/UL (ref 0–0.04)
IMM GRANULOCYTES NFR BLD AUTO: 1 % (ref 0–0.5)
LYMPHOCYTES # BLD: 3.4 K/UL (ref 0.8–3.5)
LYMPHOCYTES NFR BLD: 32 % (ref 12–49)
MAGNESIUM SERPL-MCNC: 2 MG/DL (ref 1.6–2.4)
MCH RBC QN AUTO: 27.8 PG (ref 26–34)
MCHC RBC AUTO-ENTMCNC: 33.8 G/DL (ref 30–36.5)
MCV RBC AUTO: 82.1 FL (ref 80–99)
MONOCYTES # BLD: 1.1 K/UL (ref 0–1)
MONOCYTES NFR BLD: 10 % (ref 5–13)
NEUTS SEG # BLD: 6.1 K/UL (ref 1.8–8)
NEUTS SEG NFR BLD: 55 % (ref 32–75)
NRBC # BLD: 0 K/UL (ref 0–0.01)
NRBC BLD-RTO: 0 PER 100 WBC
PHOSPHATE SERPL-MCNC: 2.7 MG/DL (ref 2.6–4.7)
PLATELET # BLD AUTO: 227 K/UL (ref 150–400)
PMV BLD AUTO: 10.3 FL (ref 8.9–12.9)
POTASSIUM SERPL-SCNC: 3.7 MMOL/L (ref 3.5–5.1)
RBC # BLD AUTO: 4.14 M/UL (ref 3.8–5.2)
SODIUM SERPL-SCNC: 140 MMOL/L (ref 136–145)
TSH SERPL DL<=0.05 MIU/L-ACNC: 1.07 UIU/ML (ref 0.36–3.74)
VIT B12 SERPL-MCNC: 523 PG/ML (ref 193–986)
WBC # BLD AUTO: 10.8 K/UL (ref 3.6–11)

## 2024-01-11 PROCEDURE — 6370000000 HC RX 637 (ALT 250 FOR IP): Performed by: SPECIALIST

## 2024-01-11 PROCEDURE — 2060000000 HC ICU INTERMEDIATE R&B

## 2024-01-11 PROCEDURE — 6370000000 HC RX 637 (ALT 250 FOR IP): Performed by: STUDENT IN AN ORGANIZED HEALTH CARE EDUCATION/TRAINING PROGRAM

## 2024-01-11 PROCEDURE — 84443 ASSAY THYROID STIM HORMONE: CPT

## 2024-01-11 PROCEDURE — 6370000000 HC RX 637 (ALT 250 FOR IP): Performed by: INTERNAL MEDICINE

## 2024-01-11 PROCEDURE — 85025 COMPLETE CBC W/AUTO DIFF WBC: CPT

## 2024-01-11 PROCEDURE — 2580000003 HC RX 258: Performed by: SPECIALIST

## 2024-01-11 PROCEDURE — 97530 THERAPEUTIC ACTIVITIES: CPT

## 2024-01-11 PROCEDURE — 80069 RENAL FUNCTION PANEL: CPT

## 2024-01-11 PROCEDURE — 99024 POSTOP FOLLOW-UP VISIT: CPT | Performed by: PHYSICIAN ASSISTANT

## 2024-01-11 PROCEDURE — 82607 VITAMIN B-12: CPT

## 2024-01-11 PROCEDURE — 82746 ASSAY OF FOLIC ACID SERUM: CPT

## 2024-01-11 PROCEDURE — 83735 ASSAY OF MAGNESIUM: CPT

## 2024-01-11 PROCEDURE — 36415 COLL VENOUS BLD VENIPUNCTURE: CPT

## 2024-01-11 PROCEDURE — 97535 SELF CARE MNGMENT TRAINING: CPT

## 2024-01-11 RX ADMIN — SODIUM CHLORIDE: 9 INJECTION, SOLUTION INTRAVENOUS at 06:05

## 2024-01-11 RX ADMIN — SODIUM CHLORIDE: 9 INJECTION, SOLUTION INTRAVENOUS at 13:45

## 2024-01-11 RX ADMIN — DIAZEPAM 5 MG: 5 TABLET ORAL at 20:57

## 2024-01-11 RX ADMIN — DIAZEPAM 5 MG: 5 TABLET ORAL at 12:56

## 2024-01-11 RX ADMIN — ACETAMINOPHEN 1000 MG: 325 TABLET ORAL at 20:56

## 2024-01-11 RX ADMIN — RANOLAZINE 500 MG: 500 TABLET, FILM COATED, EXTENDED RELEASE ORAL at 09:36

## 2024-01-11 RX ADMIN — ACETAMINOPHEN 1000 MG: 325 TABLET ORAL at 14:37

## 2024-01-11 RX ADMIN — METOPROLOL SUCCINATE 25 MG: 25 TABLET, EXTENDED RELEASE ORAL at 09:36

## 2024-01-11 RX ADMIN — ACETAMINOPHEN 1000 MG: 325 TABLET ORAL at 09:36

## 2024-01-11 RX ADMIN — RANOLAZINE 500 MG: 500 TABLET, FILM COATED, EXTENDED RELEASE ORAL at 20:57

## 2024-01-11 RX ADMIN — SODIUM CHLORIDE, PRESERVATIVE FREE 10 ML: 5 INJECTION INTRAVENOUS at 09:39

## 2024-01-11 ASSESSMENT — PAIN SCALES - GENERAL: PAINLEVEL_OUTOF10: 5

## 2024-01-11 ASSESSMENT — PAIN - FUNCTIONAL ASSESSMENT: PAIN_FUNCTIONAL_ASSESSMENT: ACTIVITIES ARE NOT PREVENTED

## 2024-01-11 ASSESSMENT — PAIN DESCRIPTION - LOCATION: LOCATION: OTHER (COMMENT)

## 2024-01-11 NOTE — CONSULTS
[facility]  CONSULTATION    Name:  MURIEL SAINZ  MR#:  [id number]  :  1955  ACCOUNT #:  [id number]  DATE OF SERVICE:  2024    REASON FOR CONSULTATION:  Cervical stenosis.    HISTORY OF PRESENT ILLNESS:  The patient is a 68-year-old female.  She was admitted to [facility] with weakness of her left upper extremity and bilateral lower extremities, left greater than right.  She was worked up and found to have severe cervical stenosis from degenerative disk disease, with a large herniated disk and spinal cord signal change.  She had a cardiac cath done while she was there for surgical clearance, and was transferred here.  I was called for evaluation.    PAST MEDICAL HISTORY:  Significant for,  1.  Hypertension.  2.  Depression.  3.  Anxiety.  4.  GERD.  5.  Dyslipidemia.  6.  Coronary artery disease.    MEDICATIONS PRIOR TO ADMISSION:  Reviewed.    ALLERGIES:  NO KNOWN DRUG ALLERGIES.    SOCIAL HISTORY:  Former smoker, quit in .    FAMILY HISTORY:  Noncontributory.    REVIEW OF SYSTEMS:  No nausea, vomiting, fevers, chills, chest pain, or shortness of breath.  Rest of the 10 systems were reviewed and were negative except as above.    PHYSICAL EXAMINATION:  GENERAL:  She is a well-developed, well-nourished, elderly female, sitting in no acute distress.  HEENT:  Her head is normocephalic, atraumatic.  Pupils are equal, round, and reactive to light.  Extraocular movements are intact.  Face is symmetric. Tongue and uvula are midline.  NEUROLOGIC:  Shoulder shrug is normal.  Strength is 5/5 in her right upper extremity.  Left upper extremity has weakness in her left  and left biceps, 5/5 in her deltoid and triceps.  Both legs are 4+/5 grossly.  Sensation is decreased along the left leg.  She is awake, alert, and oriented x3.  Her speech is fluent.  She has normal recent and remote memory.  Normal fund of knowledge on cerebellar exam.    IMAGING DATA:  MRI as 
Comments: Alert and oriented x 4.  Continued but improved paresthesias in the bilateral hands left greater than right with decreased fine motor control and dexterity.  Sensation intact in bilateral lower extremity.  DTRs 3+.  Right shoulder abduction 5-5.  Right elbow flexion 5 out of 5.  Right extension 4 out of 5.  Right knee flexion 4 out of 5.  Left shoulder abduction 4 out of 5.  Left elbow flexion 4+ out of 5.  Left lower extension 3+ out of 5.  Left finger flexion 3+ out of 5.  Right hip flexion 4 out of 5.  Right knee extension and flexion 4+ out of 5.  Right ankle dorsiflexion 5 out of 5.  Right ankle plantarflexion 4+ out of 5.  Left hip flexion 3+ out of 5.  Left knee flexion and extension 4+ out of 5.  Left ankle dorsiflexion 4 + out of 5.   Psychiatric:         Mood and Affect: Mood normal.         Behavior: Behavior normal.         Labs    CBC:  Recent Labs     01/10/24  0220 01/11/24  0109   WBC 12.8* 10.8   RBC 4.76 4.14   HGB 13.0 11.5   HCT 38.8 34.0*   MCV 81.5 82.1   RDW 14.5 15.0*    227     CHEMISTRIES:  Recent Labs     01/10/24  0220 01/11/24  0109    140   K 4.6 3.7    111*   CO2 25 23   BUN 23* 16   CREATININE 1.17* 0.95   GLUCOSE 136* 104*   PHOS 4.5 2.7   MG 2.0 2.0     PT/INR:No results for input(s): \"PROTIME\", \"INR\" in the last 72 hours.  APTT:No results for input(s): \"APTT\" in the last 72 hours.  LIVER PROFILE:No results for input(s): \"AST\", \"ALT\", \"BILIDIR\", \"BILITOT\", \"ALKPHOS\" in the last 72 hours.    Imaging/Diagnostics   No results found.    Assessment      Hospital Problems             Last Modified POA    * (Principal) Cervical radiculopathy 1/6/2024 Yes       Plan   68-year-old female found to have severe cervical stenosis with myelopathy and myelomalacia leading to left radial right-sided weakness predominantly in the upper extremities and paresthesias underwent C3-C6 ACDF who is now below functional baseline for him I do recommend acute inpatient

## 2024-01-11 NOTE — CARE COORDINATION
Transition of Care Plan    Admission  Cervical radiculopathy    Surgery 1/9/24      IPR - [facility]; Pt denied by [employer] Medicare; appeal to be submitted 1/10 by [facility]     Liaison: bAy [phone]     Transport: S     RUR: 18%  Prior Level of Functioning: Independent  Disposition: IPR  If SNF or IPR: Date F[facility] offered: [facility]  Date FOC received:   Accepting facility: [facility]   Date authorization started with reference number: 1/1; Pt denied 1/10 - appeal submitted  Date authorization received and expires: pendning  Follow up appointments: PCP, Neurosurgery   DME needed: defer to IPR  Transportation at discharge: S  [facility] Medicare/ letter given: 1/6/    second letter will be providing prior to discharge    Caregiver Contact:   Partner, Rickie Rodriguez [phone]     Discharge Caregiver contacted prior to discharge?   Care Conference needed? No    Barriers to discharge: Medical, Neurosurgery following; Placement - IPR denied 1/10 - appeal submitted by [facility] same day.     CM spoke with [facility] liaAby mcclain, and she confirmed that appeal was filed 1/10/24.   CM waiting for appeal decision     If appeal denied, CM will talk with therapy regarding  SNF vs home health.    NOTE  patient  lives at home with significant other (Rickie Rodriguez) in one level home.   Patient RW for mobility and was self care and independent with adl's prior to admission.  Family transports...       FELICIA ELDRIDGE    Graft Donor Site Bandage (Optional-Leave Blank If You Don't Want In Note): Steri-strips and a pressure bandage were applied to the donor site.

## 2024-01-12 LAB
ALBUMIN SERPL-MCNC: 2.3 G/DL (ref 3.5–5)
ANION GAP SERPL CALC-SCNC: 7 MMOL/L (ref 5–15)
BASOPHILS # BLD: 0 K/UL (ref 0–0.1)
BASOPHILS NFR BLD: 0 % (ref 0–1)
BUN SERPL-MCNC: 13 MG/DL (ref 6–20)
BUN/CREAT SERPL: 14 (ref 12–20)
CALCIUM SERPL-MCNC: 8.1 MG/DL (ref 8.5–10.1)
CHLORIDE SERPL-SCNC: 112 MMOL/L (ref 97–108)
CO2 SERPL-SCNC: 23 MMOL/L (ref 21–32)
CREAT SERPL-MCNC: 0.93 MG/DL (ref 0.55–1.02)
DIFFERENTIAL METHOD BLD: ABNORMAL
EOSINOPHIL # BLD: 0.3 K/UL (ref 0–0.4)
EOSINOPHIL NFR BLD: 4 % (ref 0–7)
ERYTHROCYTE [DISTWIDTH] IN BLOOD BY AUTOMATED COUNT: 15.5 % (ref 11.5–14.5)
GLUCOSE SERPL-MCNC: 96 MG/DL (ref 65–100)
HCT VFR BLD AUTO: 31.8 % (ref 35–47)
HGB BLD-MCNC: 10.5 G/DL (ref 11.5–16)
IMM GRANULOCYTES # BLD AUTO: 0.1 K/UL (ref 0–0.04)
IMM GRANULOCYTES NFR BLD AUTO: 1 % (ref 0–0.5)
LYMPHOCYTES # BLD: 3.3 K/UL (ref 0.8–3.5)
LYMPHOCYTES NFR BLD: 39 % (ref 12–49)
MAGNESIUM SERPL-MCNC: 1.8 MG/DL (ref 1.6–2.4)
MCH RBC QN AUTO: 27.3 PG (ref 26–34)
MCHC RBC AUTO-ENTMCNC: 33 G/DL (ref 30–36.5)
MCV RBC AUTO: 82.8 FL (ref 80–99)
MONOCYTES # BLD: 1 K/UL (ref 0–1)
MONOCYTES NFR BLD: 12 % (ref 5–13)
NEUTS SEG # BLD: 3.8 K/UL (ref 1.8–8)
NEUTS SEG NFR BLD: 44 % (ref 32–75)
NRBC # BLD: 0 K/UL (ref 0–0.01)
NRBC BLD-RTO: 0 PER 100 WBC
PHOSPHATE SERPL-MCNC: 2.9 MG/DL (ref 2.6–4.7)
PLATELET # BLD AUTO: 213 K/UL (ref 150–400)
PMV BLD AUTO: 10.8 FL (ref 8.9–12.9)
POTASSIUM SERPL-SCNC: 3.6 MMOL/L (ref 3.5–5.1)
RBC # BLD AUTO: 3.84 M/UL (ref 3.8–5.2)
SODIUM SERPL-SCNC: 142 MMOL/L (ref 136–145)
WBC # BLD AUTO: 8.5 K/UL (ref 3.6–11)

## 2024-01-12 PROCEDURE — 2580000003 HC RX 258: Performed by: SPECIALIST

## 2024-01-12 PROCEDURE — 6370000000 HC RX 637 (ALT 250 FOR IP): Performed by: STUDENT IN AN ORGANIZED HEALTH CARE EDUCATION/TRAINING PROGRAM

## 2024-01-12 PROCEDURE — 6370000000 HC RX 637 (ALT 250 FOR IP): Performed by: INTERNAL MEDICINE

## 2024-01-12 PROCEDURE — 80069 RENAL FUNCTION PANEL: CPT

## 2024-01-12 PROCEDURE — 2580000003 HC RX 258: Performed by: STUDENT IN AN ORGANIZED HEALTH CARE EDUCATION/TRAINING PROGRAM

## 2024-01-12 PROCEDURE — 97530 THERAPEUTIC ACTIVITIES: CPT

## 2024-01-12 PROCEDURE — 1200000000 HC SEMI PRIVATE

## 2024-01-12 PROCEDURE — 2580000003 HC RX 258: Performed by: INTERNAL MEDICINE

## 2024-01-12 PROCEDURE — 36415 COLL VENOUS BLD VENIPUNCTURE: CPT

## 2024-01-12 PROCEDURE — 97116 GAIT TRAINING THERAPY: CPT

## 2024-01-12 PROCEDURE — 99024 POSTOP FOLLOW-UP VISIT: CPT | Performed by: PHYSICIAN ASSISTANT

## 2024-01-12 PROCEDURE — 6370000000 HC RX 637 (ALT 250 FOR IP): Performed by: SPECIALIST

## 2024-01-12 PROCEDURE — 83735 ASSAY OF MAGNESIUM: CPT

## 2024-01-12 PROCEDURE — 85025 COMPLETE CBC W/AUTO DIFF WBC: CPT

## 2024-01-12 RX ADMIN — ACETAMINOPHEN 1000 MG: 325 TABLET ORAL at 21:01

## 2024-01-12 RX ADMIN — SODIUM CHLORIDE, PRESERVATIVE FREE 10 ML: 5 INJECTION INTRAVENOUS at 21:01

## 2024-01-12 RX ADMIN — RANOLAZINE 500 MG: 500 TABLET, FILM COATED, EXTENDED RELEASE ORAL at 21:01

## 2024-01-12 RX ADMIN — SODIUM CHLORIDE, PRESERVATIVE FREE 10 ML: 5 INJECTION INTRAVENOUS at 21:03

## 2024-01-12 RX ADMIN — SODIUM CHLORIDE, PRESERVATIVE FREE 10 ML: 5 INJECTION INTRAVENOUS at 09:48

## 2024-01-12 RX ADMIN — RANOLAZINE 500 MG: 500 TABLET, FILM COATED, EXTENDED RELEASE ORAL at 09:46

## 2024-01-12 RX ADMIN — ACETAMINOPHEN 1000 MG: 325 TABLET ORAL at 15:22

## 2024-01-12 RX ADMIN — DIAZEPAM 5 MG: 5 TABLET ORAL at 19:48

## 2024-01-12 RX ADMIN — ATORVASTATIN CALCIUM 40 MG: 40 TABLET, FILM COATED ORAL at 21:01

## 2024-01-12 RX ADMIN — METOPROLOL SUCCINATE 25 MG: 25 TABLET, EXTENDED RELEASE ORAL at 09:42

## 2024-01-12 RX ADMIN — ACETAMINOPHEN 1000 MG: 325 TABLET ORAL at 09:42

## 2024-01-12 RX ADMIN — ACETAMINOPHEN 1000 MG: 325 TABLET ORAL at 02:40

## 2024-01-12 ASSESSMENT — PAIN - FUNCTIONAL ASSESSMENT: PAIN_FUNCTIONAL_ASSESSMENT: ACTIVITIES ARE NOT PREVENTED

## 2024-01-12 ASSESSMENT — PAIN DESCRIPTION - LOCATION: LOCATION: OTHER (COMMENT)

## 2024-01-12 ASSESSMENT — PAIN DESCRIPTION - DESCRIPTORS: DESCRIPTORS: ACHING

## 2024-01-12 NOTE — CASE COMMUNICATION
Transition of Care Plan:       IPR - [facility]; Pt denied by [employer] Medicare; appeal to be submitted 1/10 by [facility]    Admission  Cervical radiculopathy    Surgery 1/9/24        Liaison: Aby [phone]     Transport: S     RUR: 18%  Prior Level of Functioning: Independent  Disposition: IPR  If SNF or IPR: Date FOC offered: [facility]  Date FOC received:   Accepting facility: [facility]   Date authorization started with reference number: 1/1; Pt denied 1/10 - appeal submitted  Date authorization received and expires: pendning  Follow up appointments: PCP, Neurosurgery   DME needed: defer to IPR  Transportation at discharge: BLS  IM/[facility] Medicare/ letter given: 1/6/    second letter will be providing prior to discharge     Caregiver Contact:   Partner, Rickie Rodriguez [phone]      Discharge Caregiver contacted prior to discharge?   Care Conference needed? No     Barriers to discharge: Medical, Neurosurgery following; Placement - IPR denied 1/10 - appeal submitted by [facility] same  -1100-CM reviewed pt chart and spoke with Aby of [facility] [phone] and confirmed that appeal is still pending at this time. CM to follow.  Sheri Lang RN BSN CCM

## 2024-01-13 PROCEDURE — 6370000000 HC RX 637 (ALT 250 FOR IP): Performed by: SPECIALIST

## 2024-01-13 PROCEDURE — 6370000000 HC RX 637 (ALT 250 FOR IP): Performed by: INTERNAL MEDICINE

## 2024-01-13 PROCEDURE — 1200000000 HC SEMI PRIVATE

## 2024-01-13 PROCEDURE — 2580000003 HC RX 258: Performed by: STUDENT IN AN ORGANIZED HEALTH CARE EDUCATION/TRAINING PROGRAM

## 2024-01-13 PROCEDURE — 2580000003 HC RX 258: Performed by: INTERNAL MEDICINE

## 2024-01-13 PROCEDURE — 97116 GAIT TRAINING THERAPY: CPT

## 2024-01-13 PROCEDURE — 2580000003 HC RX 258: Performed by: SPECIALIST

## 2024-01-13 PROCEDURE — 97535 SELF CARE MNGMENT TRAINING: CPT

## 2024-01-13 PROCEDURE — 6370000000 HC RX 637 (ALT 250 FOR IP): Performed by: STUDENT IN AN ORGANIZED HEALTH CARE EDUCATION/TRAINING PROGRAM

## 2024-01-13 PROCEDURE — 97110 THERAPEUTIC EXERCISES: CPT

## 2024-01-13 RX ADMIN — SODIUM CHLORIDE, PRESERVATIVE FREE 10 ML: 5 INJECTION INTRAVENOUS at 10:25

## 2024-01-13 RX ADMIN — ATORVASTATIN CALCIUM 40 MG: 40 TABLET, FILM COATED ORAL at 20:42

## 2024-01-13 RX ADMIN — ACETAMINOPHEN 1000 MG: 325 TABLET ORAL at 10:12

## 2024-01-13 RX ADMIN — METOPROLOL SUCCINATE 25 MG: 25 TABLET, EXTENDED RELEASE ORAL at 10:14

## 2024-01-13 RX ADMIN — SODIUM CHLORIDE, PRESERVATIVE FREE 10 ML: 5 INJECTION INTRAVENOUS at 10:15

## 2024-01-13 RX ADMIN — ACETAMINOPHEN 1000 MG: 325 TABLET ORAL at 14:53

## 2024-01-13 RX ADMIN — RANOLAZINE 500 MG: 500 TABLET, FILM COATED, EXTENDED RELEASE ORAL at 10:14

## 2024-01-13 RX ADMIN — RANOLAZINE 500 MG: 500 TABLET, FILM COATED, EXTENDED RELEASE ORAL at 20:44

## 2024-01-13 RX ADMIN — SODIUM CHLORIDE, PRESERVATIVE FREE 10 ML: 5 INJECTION INTRAVENOUS at 10:26

## 2024-01-13 RX ADMIN — ACETAMINOPHEN 1000 MG: 325 TABLET ORAL at 20:42

## 2024-01-13 RX ADMIN — DIAZEPAM 5 MG: 5 TABLET ORAL at 03:17

## 2024-01-13 RX ADMIN — ACETAMINOPHEN 1000 MG: 325 TABLET ORAL at 03:16

## 2024-01-13 RX ADMIN — SODIUM CHLORIDE, PRESERVATIVE FREE 10 ML: 5 INJECTION INTRAVENOUS at 20:44

## 2024-01-13 RX ADMIN — DIAZEPAM 5 MG: 5 TABLET ORAL at 14:57

## 2024-01-13 ASSESSMENT — PAIN DESCRIPTION - LOCATION
LOCATION: NECK

## 2024-01-13 ASSESSMENT — PAIN SCALES - GENERAL
PAINLEVEL_OUTOF10: 6
PAINLEVEL_OUTOF10: 2
PAINLEVEL_OUTOF10: 5

## 2024-01-13 ASSESSMENT — PAIN DESCRIPTION - ORIENTATION
ORIENTATION: ANTERIOR;POSTERIOR

## 2024-01-13 ASSESSMENT — PAIN DESCRIPTION - DESCRIPTORS
DESCRIPTORS: ACHING;DULL
DESCRIPTORS: ACHING;DULL

## 2024-01-13 ASSESSMENT — PAIN - FUNCTIONAL ASSESSMENT: PAIN_FUNCTIONAL_ASSESSMENT: ACTIVITIES ARE NOT PREVENTED

## 2024-01-13 NOTE — CARE COORDINATION
IPR - [facility]; Pt denied by [employer]; appeal to be submitted 1/10 by [facility]    Barriers to discharge: Medical, Neurosurgery following; Placement - IPR denied 1/10 - appeal submitted by [facility] same  CM left a vm for Crystal at [facility] [phone] asking for an update on the appeal. Waiting return call.

## 2024-01-14 PROCEDURE — 6370000000 HC RX 637 (ALT 250 FOR IP): Performed by: SPECIALIST

## 2024-01-14 PROCEDURE — 2580000003 HC RX 258: Performed by: SPECIALIST

## 2024-01-14 PROCEDURE — 97116 GAIT TRAINING THERAPY: CPT

## 2024-01-14 PROCEDURE — 1200000000 HC SEMI PRIVATE

## 2024-01-14 PROCEDURE — 97530 THERAPEUTIC ACTIVITIES: CPT

## 2024-01-14 PROCEDURE — 97110 THERAPEUTIC EXERCISES: CPT

## 2024-01-14 PROCEDURE — 6370000000 HC RX 637 (ALT 250 FOR IP): Performed by: STUDENT IN AN ORGANIZED HEALTH CARE EDUCATION/TRAINING PROGRAM

## 2024-01-14 PROCEDURE — 6370000000 HC RX 637 (ALT 250 FOR IP): Performed by: INTERNAL MEDICINE

## 2024-01-14 RX ADMIN — RANOLAZINE 500 MG: 500 TABLET, FILM COATED, EXTENDED RELEASE ORAL at 08:52

## 2024-01-14 RX ADMIN — RANOLAZINE 500 MG: 500 TABLET, FILM COATED, EXTENDED RELEASE ORAL at 20:57

## 2024-01-14 RX ADMIN — METOPROLOL SUCCINATE 25 MG: 25 TABLET, EXTENDED RELEASE ORAL at 08:52

## 2024-01-14 RX ADMIN — ACETAMINOPHEN 1000 MG: 325 TABLET ORAL at 08:52

## 2024-01-14 RX ADMIN — SODIUM CHLORIDE, PRESERVATIVE FREE 10 ML: 5 INJECTION INTRAVENOUS at 20:52

## 2024-01-14 RX ADMIN — ACETAMINOPHEN 1000 MG: 325 TABLET ORAL at 02:45

## 2024-01-14 RX ADMIN — ATORVASTATIN CALCIUM 40 MG: 40 TABLET, FILM COATED ORAL at 20:51

## 2024-01-14 RX ADMIN — ACETAMINOPHEN 1000 MG: 325 TABLET ORAL at 20:51

## 2024-01-14 RX ADMIN — DIAZEPAM 5 MG: 5 TABLET ORAL at 02:49

## 2024-01-14 RX ADMIN — DIAZEPAM 5 MG: 5 TABLET ORAL at 20:51

## 2024-01-14 RX ADMIN — ACETAMINOPHEN 1000 MG: 325 TABLET ORAL at 14:22

## 2024-01-14 ASSESSMENT — PAIN DESCRIPTION - DESCRIPTORS: DESCRIPTORS: ACHING

## 2024-01-14 ASSESSMENT — PAIN DESCRIPTION - ORIENTATION: ORIENTATION: LOWER

## 2024-01-14 ASSESSMENT — PAIN DESCRIPTION - LOCATION: LOCATION: NECK

## 2024-01-14 ASSESSMENT — PAIN SCALES - GENERAL: PAINLEVEL_OUTOF10: 6

## 2024-01-14 NOTE — CARE COORDINATION
CM reviewed chart and called to speak with Adiel at [facility] to check on pending appeal.  Per adiel, his last update is that it is still pending and he will call if that changes today.  Amarilis Mcmahon, BSW, ACM

## 2024-01-15 PROCEDURE — 6370000000 HC RX 637 (ALT 250 FOR IP): Performed by: SPECIALIST

## 2024-01-15 PROCEDURE — 97530 THERAPEUTIC ACTIVITIES: CPT | Performed by: PHYSICAL THERAPIST

## 2024-01-15 PROCEDURE — 97530 THERAPEUTIC ACTIVITIES: CPT

## 2024-01-15 PROCEDURE — 2580000003 HC RX 258: Performed by: STUDENT IN AN ORGANIZED HEALTH CARE EDUCATION/TRAINING PROGRAM

## 2024-01-15 PROCEDURE — 6370000000 HC RX 637 (ALT 250 FOR IP): Performed by: INTERNAL MEDICINE

## 2024-01-15 PROCEDURE — 2580000003 HC RX 258: Performed by: INTERNAL MEDICINE

## 2024-01-15 PROCEDURE — 6370000000 HC RX 637 (ALT 250 FOR IP): Performed by: STUDENT IN AN ORGANIZED HEALTH CARE EDUCATION/TRAINING PROGRAM

## 2024-01-15 PROCEDURE — 1200000000 HC SEMI PRIVATE

## 2024-01-15 PROCEDURE — 2580000003 HC RX 258: Performed by: SPECIALIST

## 2024-01-15 PROCEDURE — 97116 GAIT TRAINING THERAPY: CPT | Performed by: PHYSICAL THERAPIST

## 2024-01-15 PROCEDURE — 97535 SELF CARE MNGMENT TRAINING: CPT

## 2024-01-15 RX ADMIN — SODIUM CHLORIDE, PRESERVATIVE FREE 10 ML: 5 INJECTION INTRAVENOUS at 09:31

## 2024-01-15 RX ADMIN — RANOLAZINE 500 MG: 500 TABLET, FILM COATED, EXTENDED RELEASE ORAL at 22:18

## 2024-01-15 RX ADMIN — ATORVASTATIN CALCIUM 40 MG: 40 TABLET, FILM COATED ORAL at 22:18

## 2024-01-15 RX ADMIN — RANOLAZINE 500 MG: 500 TABLET, FILM COATED, EXTENDED RELEASE ORAL at 09:30

## 2024-01-15 RX ADMIN — METOPROLOL SUCCINATE 25 MG: 25 TABLET, EXTENDED RELEASE ORAL at 09:29

## 2024-01-15 RX ADMIN — ACETAMINOPHEN 1000 MG: 325 TABLET ORAL at 18:23

## 2024-01-15 RX ADMIN — ACETAMINOPHEN 1000 MG: 325 TABLET ORAL at 22:17

## 2024-01-15 RX ADMIN — ACETAMINOPHEN 1000 MG: 325 TABLET ORAL at 09:30

## 2024-01-15 RX ADMIN — DIAZEPAM 5 MG: 5 TABLET ORAL at 18:22

## 2024-01-15 ASSESSMENT — PAIN DESCRIPTION - LOCATION
LOCATION: LEG
LOCATION: LEG

## 2024-01-15 ASSESSMENT — PAIN DESCRIPTION - ORIENTATION
ORIENTATION: LEFT
ORIENTATION: LEFT;RIGHT

## 2024-01-15 ASSESSMENT — PAIN SCALES - GENERAL
PAINLEVEL_OUTOF10: 4
PAINLEVEL_OUTOF10: 3

## 2024-01-15 ASSESSMENT — PAIN DESCRIPTION - DESCRIPTORS
DESCRIPTORS: ACHING;TENDER
DESCRIPTORS: SHOOTING

## 2024-01-16 LAB
ANION GAP SERPL CALC-SCNC: 3 MMOL/L (ref 5–15)
BUN SERPL-MCNC: 13 MG/DL (ref 6–20)
BUN/CREAT SERPL: 14 (ref 12–20)
CALCIUM SERPL-MCNC: 7.8 MG/DL (ref 8.5–10.1)
CHLORIDE SERPL-SCNC: 110 MMOL/L (ref 97–108)
CO2 SERPL-SCNC: 26 MMOL/L (ref 21–32)
CREAT SERPL-MCNC: 0.95 MG/DL (ref 0.55–1.02)
ERYTHROCYTE [DISTWIDTH] IN BLOOD BY AUTOMATED COUNT: 15.7 % (ref 11.5–14.5)
GLUCOSE SERPL-MCNC: 97 MG/DL (ref 65–100)
HCT VFR BLD AUTO: 30.4 % (ref 35–47)
HGB BLD-MCNC: 10.6 G/DL (ref 11.5–16)
MCH RBC QN AUTO: 28.2 PG (ref 26–34)
MCHC RBC AUTO-ENTMCNC: 34.9 G/DL (ref 30–36.5)
MCV RBC AUTO: 80.9 FL (ref 80–99)
NRBC # BLD: 0 K/UL (ref 0–0.01)
NRBC BLD-RTO: 0 PER 100 WBC
PLATELET # BLD AUTO: 205 K/UL (ref 150–400)
PMV BLD AUTO: 10 FL (ref 8.9–12.9)
POTASSIUM SERPL-SCNC: 3.4 MMOL/L (ref 3.5–5.1)
RBC # BLD AUTO: 3.76 M/UL (ref 3.8–5.2)
SODIUM SERPL-SCNC: 139 MMOL/L (ref 136–145)
WBC # BLD AUTO: 7.8 K/UL (ref 3.6–11)

## 2024-01-16 PROCEDURE — 6370000000 HC RX 637 (ALT 250 FOR IP): Performed by: INTERNAL MEDICINE

## 2024-01-16 PROCEDURE — 85027 COMPLETE CBC AUTOMATED: CPT

## 2024-01-16 PROCEDURE — 36415 COLL VENOUS BLD VENIPUNCTURE: CPT

## 2024-01-16 PROCEDURE — 2580000003 HC RX 258: Performed by: STUDENT IN AN ORGANIZED HEALTH CARE EDUCATION/TRAINING PROGRAM

## 2024-01-16 PROCEDURE — 97530 THERAPEUTIC ACTIVITIES: CPT

## 2024-01-16 PROCEDURE — 97116 GAIT TRAINING THERAPY: CPT | Performed by: PHYSICAL THERAPIST

## 2024-01-16 PROCEDURE — 2580000003 HC RX 258: Performed by: INTERNAL MEDICINE

## 2024-01-16 PROCEDURE — 6370000000 HC RX 637 (ALT 250 FOR IP): Performed by: SPECIALIST

## 2024-01-16 PROCEDURE — 97530 THERAPEUTIC ACTIVITIES: CPT | Performed by: PHYSICAL THERAPIST

## 2024-01-16 PROCEDURE — 97535 SELF CARE MNGMENT TRAINING: CPT

## 2024-01-16 PROCEDURE — 80048 BASIC METABOLIC PNL TOTAL CA: CPT

## 2024-01-16 PROCEDURE — 1200000000 HC SEMI PRIVATE

## 2024-01-16 PROCEDURE — 6370000000 HC RX 637 (ALT 250 FOR IP): Performed by: STUDENT IN AN ORGANIZED HEALTH CARE EDUCATION/TRAINING PROGRAM

## 2024-01-16 RX ADMIN — RANOLAZINE 500 MG: 500 TABLET, FILM COATED, EXTENDED RELEASE ORAL at 21:03

## 2024-01-16 RX ADMIN — SODIUM CHLORIDE, PRESERVATIVE FREE 10 ML: 5 INJECTION INTRAVENOUS at 09:51

## 2024-01-16 RX ADMIN — ACETAMINOPHEN 1000 MG: 325 TABLET ORAL at 21:03

## 2024-01-16 RX ADMIN — SODIUM CHLORIDE, PRESERVATIVE FREE 10 ML: 5 INJECTION INTRAVENOUS at 21:04

## 2024-01-16 RX ADMIN — DIAZEPAM 5 MG: 5 TABLET ORAL at 01:59

## 2024-01-16 RX ADMIN — RANOLAZINE 500 MG: 500 TABLET, FILM COATED, EXTENDED RELEASE ORAL at 09:29

## 2024-01-16 RX ADMIN — ACETAMINOPHEN 1000 MG: 325 TABLET ORAL at 02:00

## 2024-01-16 RX ADMIN — ACETAMINOPHEN 1000 MG: 325 TABLET ORAL at 10:18

## 2024-01-16 RX ADMIN — DIAZEPAM 5 MG: 5 TABLET ORAL at 21:03

## 2024-01-16 RX ADMIN — ATORVASTATIN CALCIUM 40 MG: 40 TABLET, FILM COATED ORAL at 21:03

## 2024-01-16 RX ADMIN — METOPROLOL SUCCINATE 25 MG: 25 TABLET, EXTENDED RELEASE ORAL at 09:29

## 2024-01-16 ASSESSMENT — PAIN SCALES - GENERAL: PAINLEVEL_OUTOF10: 4

## 2024-01-16 ASSESSMENT — PAIN - FUNCTIONAL ASSESSMENT: PAIN_FUNCTIONAL_ASSESSMENT: ACTIVITIES ARE NOT PREVENTED

## 2024-01-16 ASSESSMENT — PAIN DESCRIPTION - DESCRIPTORS: DESCRIPTORS: ACHING

## 2024-01-16 ASSESSMENT — PAIN DESCRIPTION - LOCATION: LOCATION: LEG

## 2024-01-16 ASSESSMENT — PAIN DESCRIPTION - ORIENTATION: ORIENTATION: RIGHT;LEFT

## 2024-01-17 PROCEDURE — 6370000000 HC RX 637 (ALT 250 FOR IP): Performed by: STUDENT IN AN ORGANIZED HEALTH CARE EDUCATION/TRAINING PROGRAM

## 2024-01-17 PROCEDURE — 6370000000 HC RX 637 (ALT 250 FOR IP): Performed by: SPECIALIST

## 2024-01-17 PROCEDURE — 2580000003 HC RX 258: Performed by: STUDENT IN AN ORGANIZED HEALTH CARE EDUCATION/TRAINING PROGRAM

## 2024-01-17 PROCEDURE — 1200000000 HC SEMI PRIVATE

## 2024-01-17 PROCEDURE — 6370000000 HC RX 637 (ALT 250 FOR IP): Performed by: INTERNAL MEDICINE

## 2024-01-17 RX ADMIN — ATORVASTATIN CALCIUM 40 MG: 40 TABLET, FILM COATED ORAL at 21:46

## 2024-01-17 RX ADMIN — SODIUM CHLORIDE, PRESERVATIVE FREE 10 ML: 5 INJECTION INTRAVENOUS at 10:26

## 2024-01-17 RX ADMIN — METOPROLOL SUCCINATE 25 MG: 25 TABLET, EXTENDED RELEASE ORAL at 09:13

## 2024-01-17 RX ADMIN — RANOLAZINE 500 MG: 500 TABLET, FILM COATED, EXTENDED RELEASE ORAL at 09:13

## 2024-01-17 RX ADMIN — ACETAMINOPHEN 1000 MG: 325 TABLET ORAL at 03:34

## 2024-01-17 RX ADMIN — ACETAMINOPHEN 1000 MG: 325 TABLET ORAL at 09:13

## 2024-01-17 RX ADMIN — DIAZEPAM 5 MG: 5 TABLET ORAL at 21:46

## 2024-01-17 RX ADMIN — ACETAMINOPHEN 1000 MG: 325 TABLET ORAL at 21:46

## 2024-01-17 RX ADMIN — RANOLAZINE 500 MG: 500 TABLET, FILM COATED, EXTENDED RELEASE ORAL at 21:46

## 2024-01-17 NOTE — PROGRESS NOTES
[facility]                                                                                          Hospitalist Progress Note  Matt Cox MD  Office Phone: [phone]        Date of Service:  2024  NAME:  Yusra Fontenot  :  1955  MRN:  [id number]       Admission Summary:   67 yo woman with HTN, depression, anxiety, GERD, DLD, CAD, and cervical DDD who was transferred from [facility] to [facility] on 2024 for neurosurgical evaluation of cervical DDD with cervical myelopathy involving LUE>RUE weakness/numbness/tingling.      Interval history / Subjective:   Pt was seen/examined in follow up. She was sitting up in a chair  She had no complaints for me today  Awating placement     Assessment & Plan:     Cervical DDD w/ spinal stenosis & cervical myelopathy  - MRI C-spine showing: Multilevel severe spinal stenosis as detailed. Myelomalacia of the cervical cord  - transferred from [facility] to [facility] for NSGY eval/intervention  - s/p anterior cervical discectomy with spinal cord and nerve root decompression C3-6, anterior arthrodesis C3-C6, anterior cervical plating C4-C6, insertion titanium cage x3 by NSGY Dr Fallon   - pain/nausea control  - postop PT/OT eval: recommending IPR but auth prior to surgery was denied. Appeal filed    Borderline hypotension   - no h/o HTN and only on metoprolol  - continue IVNS     CAD  - preoperative risk assessment at [facility] noted and felt to be moderate risk  - s/p LHC  mid-LM 45%, mLAD 50-60%, 50% OM1, and a 60% RCA lesion  - continue metoprolol with hold parameter, ranolazine and atorvastatin     Dyslipidemia  - continue home statin    CKD Stage 3  - Cr stable    Afebrile leucocytosis  - trending down     Code status: Full  Prophylaxis: enoxaparin on hold; SCDs  Care Plan discussed with: pt, CM, IDR  Anticipated Disposition: IPR but auth submitted prior to surgery denied and cannot be resubmitted;  Inpatient  Cardiac monitoring: 
        [facility]                                                                                          Hospitalist Progress Note  Matt Cox MD  Office Phone: [phone]        Date of Service:  2024  NAME:  Yusra Fontenot  :  1955  MRN:  [id number]       Admission Summary:   67 yo woman with HTN, depression, anxiety, GERD, DLD, CAD, and cervical DDD who was transferred from [facility] to [facility] on 2024 for neurosurgical evaluation of cervical DDD with cervical myelopathy involving LUE>RUE weakness/numbness/tingling.      Interval history / Subjective:   Pt was seen/examined in follow up. She was sitting up in bed  Her only complaints was some difficulty swallowing due to cervical collar  Awating placement     Assessment & Plan:     Cervical DDD w/ spinal stenosis & cervical myelopathy  - MRI C-spine showing: Multilevel severe spinal stenosis as detailed. Myelomalacia of the cervical cord  - transferred from [facility] to [facility] for NSGY eval/intervention  - s/p anterior cervical discectomy with spinal cord and nerve root decompression C3-6, anterior arthrodesis C3-C6, anterior cervical plating C4-C6, insertion titanium cage x3 by NSGY Dr Fallon   - pain/nausea control  - postop PT/OT eval: recommending IPR but auth prior to surgery was denied. Appeal filed    Borderline hypotension   - no h/o HTN and only on metoprolol  - continue IVNS     CAD  - preoperative risk assessment at [facility] noted and felt to be moderate risk  - s/p LHC  mid-LM 45%, mLAD 50-60%, 50% OM1, and a 60% RCA lesion  - continue metoprolol with hold parameter, ranolazine and atorvastatin     Dyslipidemia  - continue home statin    CKD Stage 3  - Cr stable    Afebrile leucocytosis  - trending down     Code status: Full  Prophylaxis: enoxaparin on hold; SCDs  Care Plan discussed with: pt, CM, IDR  Anticipated Disposition: IPR but auth submitted prior to surgery denied and cannot be 
        [facility]                                                                                          Hospitalist Progress Note  Matt Cox MD  Office Phone: [phone]        Date of Service:  1/15/2024  NAME:  Yusra Fontenot  :  1955  MRN:  [id number]       Admission Summary:   67 yo woman with HTN, depression, anxiety, GERD, DLD, CAD, and cervical DDD who was transferred from [facility] to [facility] on 2024 for neurosurgical evaluation of cervical DDD with cervical myelopathy involving LUE>RUE weakness/numbness/tingling.      Interval history / Subjective:   Pt was seen/examined in follow up. She was sitting up in a chair  She had no complaints for me today  Awating placement     Assessment & Plan:     Cervical DDD w/ spinal stenosis & cervical myelopathy  - MRI C-spine showing: Multilevel severe spinal stenosis as detailed. Myelomalacia of the cervical cord  - transferred from [facility] to [facility] for NSGY eval/intervention  - s/p anterior cervical discectomy with spinal cord and nerve root decompression C3-6, anterior arthrodesis C3-C6, anterior cervical plating C4-C6, insertion titanium cage x3 by NSGY Dr Fallon   - pain/nausea control  - postop PT/OT eval: recommending IPR but auth prior to surgery was denied. Appeal filed    Borderline hypotension   - no h/o HTN and only on metoprolol  - continue IVNS     CAD  - preoperative risk assessment at [facility] noted and felt to be moderate risk  - s/p LHC  mid-LM 45%, mLAD 50-60%, 50% OM1, and a 60% RCA lesion  - continue metoprolol with hold parameter, ranolazine and atorvastatin     Dyslipidemia  - continue home statin    CKD Stage 3  - Cr stable    Afebrile leucocytosis  - trending down     Code status: Full  Prophylaxis: enoxaparin on hold; SCDs  Care Plan discussed with: pt, CM, IDR  Anticipated Disposition: IPR but auth submitted prior to surgery denied and cannot be resubmitted;  Inpatient  Cardiac monitoring: 
        [facility]                                                                                          Hospitalist Progress Note  Misty Frank MD  Office Phone: [phone]        Date of Service:  1/10/2024  NAME:  Yusra Fontenot  :  1955  MRN:  [id number]       Admission Summary:   69 yo woman with HTN, depression, anxiety, GERD, DLD, CAD, and cervical DDD who was transferred from [facility] to [facility] on 2024 for neurosurgical evaluation of cervical DDD with cervical myelopathy involving LUE>RUE weakness/numbness/tingling.      Interval history / Subjective:   Pt was seen/examined in follow up. She was sitting up in a chair after working with PT/OT. She still has L arm and leg weakness and L fingers are curling. She is still in [place] collar. BP low this morning.      Assessment & Plan:     Cervical DDD  Cervical spinal stenosis  Cervical myelopathy  LUE weakness  BLE weakness  - MRI C-spine 1/3 wwo contrast: Multilevel severe spinal stenosis as detailed. Myelomalacia of the cervical cord  - transferred from [facility] to [facility] for NSGY eval/intervention  - s/p anterior cervical discectomy with spinal cord and nerve root decompression C3-6, anterior arthrodesis C3-C6, anterior cervical plating C4-C6, insertion titanium cage x3 by NSGY Dr Fallon   - pain/nausea control  - postop PT/OT eval: recommending IPR but auth prior to surgery was denied. CM to work it out    Borderline hypotension   - no h/o HTN and only on metoprolol  - continue IVNS     CAD  - preoperative risk assessment at [facility] noted and felt to be moderate risk  - s/p LHC  mid-LM 45%, mLAD 50-60%, 50% OM1, and a 60% RCA lesion  - continue metoprolol with hold parameter, ranolazine and atorvastatin     Dyslipidemia  - continue home statin    CKD Stage 3  - Cr stable    Afebrile leucocytosis  - trending down     Code status: Full  Prophylaxis: enoxaparin on hold; SCDs  Care Plan discussed with: pt, CM, 
        [facility]                                                                                          Hospitalist Progress Note  Matt Cox MD  Office Phone: [phone]        Date of Service:  2024  NAME:  Yusra Fontenot  :  1955  MRN:  [id number]       Admission Summary:   69 yo woman with HTN, depression, anxiety, GERD, DLD, CAD, and cervical DDD who was transferred from [facility] to [facility] on 2024 for neurosurgical evaluation of cervical DDD with cervical myelopathy involving LUE>RUE weakness/numbness/tingling.      Interval history / Subjective:   Pt was seen/examined in follow up. She was sitting up in a chair  She had no complaints for me today  Awating placement     Assessment & Plan:     Cervical DDD w/ spinal stenosis & cervical myelopathy  - MRI C-spine showing: Multilevel severe spinal stenosis as detailed. Myelomalacia of the cervical cord  - transferred from [facility] to [facility] for NSGY eval/intervention  - s/p anterior cervical discectomy with spinal cord and nerve root decompression C3-6, anterior arthrodesis C3-C6, anterior cervical plating C4-C6, insertion titanium cage x3 by NSGY Dr Fallon   - pain/nausea control  - postop PT/OT eval: recommending IPR but auth prior to surgery was denied. Appeal filed    Borderline hypotension   - no h/o HTN and only on metoprolol  - continue IVNS     CAD  - preoperative risk assessment at [facility] noted and felt to be moderate risk  - s/p LHC  mid-LM 45%, mLAD 50-60%, 50% OM1, and a 60% RCA lesion  - continue metoprolol with hold parameter, ranolazine and atorvastatin     Dyslipidemia  - continue home statin    CKD Stage 3  - Cr stable    Afebrile leucocytosis  - trending down     Code status: Full  Prophylaxis: enoxaparin on hold; SCDs  Care Plan discussed with: pt, CM, IDR  Anticipated Disposition: IPR but auth submitted prior to surgery denied and cannot be resubmitted;  Inpatient  Cardiac monitoring: 
        [facility]                                                                                          Hospitalist Progress Note  Misty Frank MD  Office Phone: [phone]        Date of Service:  2024  NAME:  Yusra Fontenot  :  1955  MRN:  [id number]       Admission Summary:   69 yo woman with HTN, depression, anxiety, GERD, DLD, CAD, and cervical DDD who was transferred from [facility] to [facility] on 2024 for neurosurgical evaluation of cervical DDD with cervical myelopathy involving LUE>RUE weakness/numbness/tingling.      Interval history / Subjective:   Pt was seen/examined in PACU in follow up. She had ADCF C3-6 by Dr Fallon. Pt was yelling about choking with the [place] collar in place and kept yelling \"Get it off; let me out!\" She would not answer any of my questions.     Assessment & Plan:     Cervical DDD  Cervical spinal stenosis  Cervical myelopathy  LUE weakness  BLE weakness  - MRI C-spine 1/3 wwo contrast: Multilevel severe spinal stenosis as detailed. Myelomalacia of the cervical cord  - transferred from [facility] to [facility] for NSGY eval/intervention  - s/p anterior cervical discectomy with spinal cord and nerve root decompression C3-6, anterior arthrodesis C3-C6, anterior cervical plating C4-C6, insertion titanium cage x3 by NSGY Dr Fallon   - pain/nausea control  - will need postop PT/OT evals     CAD  - preoperative risk assessment at [facility] noted and felt to be moderate risk  - s/p LHC  mid-LM 45%, mLAD 50-60%, 50% OM1, and a 60% RCA lesion  - continue beta-blocker, ranolazine and atorvastatin     Dyslipidemia  - continue home statin    CKD Stage 3  - Cr stable     Code status: Full  Prophylaxis: enoxaparin on hold; SCDs  Care Plan discussed with: pt, CM, IDR  Anticipated Disposition: TBD  Inpatient  Cardiac monitoring: Telemetry  Central Line:   none         Social Determinants of Health     Tobacco Use: Medium Risk (2024)    Patient History     
   [facility]     Hospitalist Progress Note    NAME: Yusra Fontenot   : 1955   MRN: [id number]     Date/Time: 2024 1:28 PM  Patient PCP: Kavon Thurman MD    Admission Summary:   Yusra Fontenot is a 68 y.o. female with history of hypertension depression and anxiety, GERD, dyslipidemia, CAD, cervical degenerative disc disease who was transferred from [facility] to [facility] for neurosurgical evaluation of cervical degenerative disc disease with cervical myelopathy.  Patient states she has had progressive weakness numbness and tingling in both her upper extremities but this has been more severe in her left upper extremity.  States she eventually developed difficulty opening her left hand which prompted her to seek emergency room care  She was evaluated an MRI of her cervical spine showed multilevel severe degenerative disc disease.  She was transferred to [facility] for neurosurgical evaluation.  The patient denies any fever, chills, chest or abdominal pain, nausea, vomiting, cough, congestion, recent illness, palpitations, or dysuria.    Assessment / Plan:     Cervical DDD / Cervical Myelopathy with spinal cord compression  BL LE weakness  -MRI C spine: IMPRESSION:  1. Multilevel severe spinal stenosis as detailed.  2. Myelomalacia of the cervical cord.  -transferred from [facility] for NSGY intervention  -Appreciate Neurosurgery, noted planned ACDF C3-6 tomorrow     CAD  -Preoperative risk assessment at [facility] noted.   [place] to be moderate risk.    -patient underwent LHC on  which showed Mid LM 45%, mLAD 50-60%, 50% OM1, and a 60% RCA lesion.    -Continue beta-blocker, Ranexa and Lipitor  -Hold aspirin in anticipation of needing surgery     Dyslipidemia: Continue home statin  CKD stage III: Stable.  Monitor with daily BMP     Obesity  -Counseled on weight loss, dieting and exercise      Code status: 
   [facility]     Hospitalist Progress Note    NAME: Yusra Fontenot   : 1955   MRN: [id number]     Date/Time: 2024 2:56 PM  Patient PCP: Kavon Thurman MD    Admission Summary:   Yusra Fontenot is a 68 y.o. female with history of hypertension depression and anxiety, GERD, dyslipidemia, CAD, cervical degenerative disc disease who was transferred from [facility] to [facility] for neurosurgical evaluation of cervical degenerative disc disease with cervical myelopathy.  Patient states she has had progressive weakness numbness and tingling in both her upper extremities but this has been more severe in her left upper extremity.  States she eventually developed difficulty opening her left hand which prompted her to seek emergency room care  She was evaluated an MRI of her cervical spine showed multilevel severe degenerative disc disease.  She was transferred to [facility] for neurosurgical evaluation.  The patient denies any fever, chills, chest or abdominal pain, nausea, vomiting, cough, congestion, recent illness, palpitations, or dysuria.    Assessment / Plan:       Cervical DDD / Cervical Myelopathy  BL LE weakness  -MRI C spine: IMPRESSION:  1. Multilevel severe spinal stenosis as detailed.  2. Myelomalacia of the cervical cord.  -transferred from [facility] for NSGY intervention  -Additional steroids per neurosurgery  -Neurosurgical consultation      CAD  -Preoperative risk assessment at [facility] noted.   [place] to be moderate risk.    -patient underwent LHC on  which showed Mid LM 45%, mLAD 50-60%, 50% OM1, and a 60% RCA lesion.    -Continue beta-blocker, Ranexa and Lipitor  -Hold aspirin in anticipation of needing surgery     Dyslipidemia  -Continue home statin     CKD stage III  -Stable.  Monitor with daily BMP     Obesity  -Counseled on weight loss, dieting and exercise      Code status: Full  Prophylaxis:  
  Neuro checks were done Q2/hr .,8pm,10pm 12am 2am 4am 6am ,on flow sheet ,status unchanged,Pt denied any pain or discomfort.restful night.report endorsed to am nurse.  
 Spine Surgery Progress Note  Stacey Aguirre PA-C        Admit Date: 2024   LOS: 4 days        Daily Progress Note: 1/10/2024    POD:1 Day Post-Op    S/P: Procedure(s):  C3-4, C4-5, C5-6 ANTERIOR CERVICAL DISECTOMY AND FUSION    Subjective:     Ms. Fontenot is doing very well on POD#1 after surgery. Pain minimal. She endorses a sore throat but no difficulty swallowing. Voiding without issue. Had a BM this AM. She feels her balance is already improved. Left hand moving easier than preop. Numbness and tingling is at baseline.  Pt denies chest pain, nausea, vomiting, headache, and dyspnea.   Pt sitting up comfortably in a chair.    Objective:     Vital signs  Temp (24hrs), Av.4 °F (36.3 °C), Min:97 °F (36.1 °C), Max:97.7 °F (36.5 °C)   No intake/output data recorded.   1901 - 01/10 0700  In: 1260 [P.O.:60; I.V.:1200]  Out: 1371 [Urine:1351]    /66   Pulse 65   Temp 97.4 °F (36.3 °C) (Oral)   Resp 16   Wt 65.9 kg (145 lb 4.5 oz)   SpO2 100%   BMI 24.93 kg/m²          Physical Exam:  Gen: No acute distress.   Neuro: A&Ox3. Follows commands. Speech clear. Affect normal.  Face symmetric.   CERVANTES spontaneously. Strength 5/5 in RUE . LUE  slightly weak. BLE strength 4+/5.  Sensation intact.  Gait deferred.  Calves soft and supple; No pain with passive stretch  Skin: Incision C/D/I; neck soft and supple    24 hour results:    Recent Results (from the past 24 hour(s))   TYPE AND SCREEN    Collection Time: 24  1:23 PM   Result Value Ref Range    Crossmatch expiration date 2024,2359     ABO/Rh O POSITIVE     Antibody Screen NEG    CBC with Auto Differential    Collection Time: 01/10/24  2:20 AM   Result Value Ref Range    WBC 12.8 (H) 3.6 - 11.0 K/uL    RBC 4.76 3.80 - 5.20 M/uL    Hemoglobin 13.0 11.5 - 16.0 g/dL    Hematocrit 38.8 35.0 - 47.0 %    MCV 81.5 80.0 - 99.0 FL    MCH 27.3 26.0 - 34.0 PG    MCHC 33.5 30.0 - 36.5 g/dL    RDW 14.5 11.5 - 14.5 %    Platelets 269 150 - 400 K/uL    
 Spine Surgery Progress Note  Stacey Aguirre PA-C     Admit Date: 2024   LOS: 5 days      Daily Progress Note: 2024    POD:2 Days Post-Op    S/P: Procedure(s):  C3-4, C4-5, C5-6 ANTERIOR CERVICAL DISECTOMY AND FUSION    Subjective:     Ms. Fontenot continues to do well on POD#2 after surgery. She states she has no pain. She endorses a sore throat but no difficulty swallowing. Voiding without issue. Had another BM this AM. She feels her balance is already improved. Left hand moving easier than preop. Numbness and tingling is at baseline but slightly improved compared to preop.  Pt denies chest pain, nausea, vomiting, headache, and dyspnea.    Objective:     Vital signs  Temp (24hrs), Av.7 °F (36.5 °C), Min:97.3 °F (36.3 °C), Max:98.4 °F (36.9 °C)   No intake/output data recorded.   1901 -  0700  In: -   Out: 1100 [Urine:600]    /70   Pulse 68   Temp 98 °F (36.7 °C) (Oral)   Resp 19   Wt 65.9 kg (145 lb 4.5 oz)   SpO2 100%   BMI 24.93 kg/m²          Physical Exam:  Gen: No acute distress.   Neuro: A&Ox3. Follows commands. Speech clear. Affect normal.  Face symmetric.   CERVANTES spontaneously. Strength 5/5 in RUE . LUE  slightly weak. BLE strength 4+/5.  Sensation intact.  Gait deferred.  Calves soft and supple; No pain with passive stretch  Skin: Incision C/D/I; neck soft and supple    24 hour results:    Recent Results (from the past 24 hour(s))   CBC with Auto Differential    Collection Time: 24  1:09 AM   Result Value Ref Range    WBC 10.8 3.6 - 11.0 K/uL    RBC 4.14 3.80 - 5.20 M/uL    Hemoglobin 11.5 11.5 - 16.0 g/dL    Hematocrit 34.0 (L) 35.0 - 47.0 %    MCV 82.1 80.0 - 99.0 FL    MCH 27.8 26.0 - 34.0 PG    MCHC 33.8 30.0 - 36.5 g/dL    RDW 15.0 (H) 11.5 - 14.5 %    Platelets 227 150 - 400 K/uL    MPV 10.3 8.9 - 12.9 FL    Nucleated RBCs 0.0 0  WBC    nRBC 0.00 0.00 - 0.01 K/uL    Neutrophils % 55 32 - 75 %    Lymphocytes % 32 12 - 49 %    Monocytes % 10 5 - 13 %    
 Spine Surgery Progress Note  Stacey Aguirre PA-C     Admit Date: 2024   LOS: 6 days      Daily Progress Note: 2024    POD:3 Days Post-Op    S/P: Procedure(s):  C3-4, C4-5, C5-6 ANTERIOR CERVICAL DISECTOMY AND FUSION    Subjective:     No acute events overnight. Doing very well. Making progress with PT. Swallowing much better today. Collar adjusted and feels better.  Pt denies chest pain, nausea, vomiting, headache, and dyspnea.    Objective:     Vital signs  Temp (24hrs), Av.9 °F (36.6 °C), Min:97.7 °F (36.5 °C), Max:98 °F (36.7 °C)   No intake/output data recorded.  01/10 1901 -  0700  In: -   Out: 950 [Urine:450]    /70   Pulse 70   Temp 97.8 °F (36.6 °C) (Oral)   Resp 16   Wt 83.8 kg (184 lb 11.9 oz)   SpO2 100%   BMI 31.70 kg/m²          Physical Exam:  Gen: No acute distress.   Neuro: A&Ox3. Follows commands. Speech clear. Affect normal.  Face symmetric.   CERVANTES spontaneously. Strength 5/5 in RUE . LUE  slightly weak. BLE strength 4+/5.  Sensation intact.  Gait deferred.  Calves soft and supple; No pain with passive stretch  Skin: Incision C/D/I; neck soft and supple    24 hour results:    Recent Results (from the past 24 hour(s))   CBC with Auto Differential    Collection Time: 24  4:00 AM   Result Value Ref Range    WBC 8.5 3.6 - 11.0 K/uL    RBC 3.84 3.80 - 5.20 M/uL    Hemoglobin 10.5 (L) 11.5 - 16.0 g/dL    Hematocrit 31.8 (L) 35.0 - 47.0 %    MCV 82.8 80.0 - 99.0 FL    MCH 27.3 26.0 - 34.0 PG    MCHC 33.0 30.0 - 36.5 g/dL    RDW 15.5 (H) 11.5 - 14.5 %    Platelets 213 150 - 400 K/uL    MPV 10.8 8.9 - 12.9 FL    Nucleated RBCs 0.0 0  WBC    nRBC 0.00 0.00 - 0.01 K/uL    Neutrophils % 44 32 - 75 %    Lymphocytes % 39 12 - 49 %    Monocytes % 12 5 - 13 %    Eosinophils % 4 0 - 7 %    Basophils % 0 0 - 1 %    Immature Granulocytes 1 (H) 0.0 - 0.5 %    Neutrophils Absolute 3.8 1.8 - 8.0 K/UL    Lymphocytes Absolute 3.3 0.8 - 3.5 K/UL    Monocytes Absolute 1.0 0.0 - 
Neurosurgery    Discussed surgical procedure with patient's , sister, and brother-in-law at bedside. Reviewed MRI films with family. They understand the risks of bleeding, infection, difficulty swallowing, spinal cord injury, injury to the nerves. They have decided to proceed with surgery. Will get nurse to get consent form signed and place on the chart. NPO after midnight. Notified Dr. Danie Knight, NP  
Neurosurgery    Doing well  Awaiting rehab    
Neurosurgery    Pt seen and examined, full consult to follow.     Significant spinal cord compression with advancing myelopathy.  Needs surgical intervention.  Discussed need for anterior cervical discectomy and fusion at C3-6.    We discussed the risk of bleeding, infection, nerve or spinal cord injury which could lead to worsening of the pain, numbness or weakness, spinal cord injury which could lead to paralysis, Discussed with the significant stenosis - this is her biggest risk. The risk of misplacement of the graft, screws or rods, non-union of the bone, the risk of heart attack, stroke, coma and death,   blood clots in legs, urine infection or pneumonia       She wants to talk to her . He will come to hospital today.    Will plan for surgery tomorrow 1300 - ACDF C3-6.      
Neurosurgery is aware of this patient. Awaiting their arrival to [facility]. Tentative plans for cervical spine decompression and fusion surgery on 1/9/24 w Dr. Fallon pending cardiac clearance. If pt needs stenting requiring post procedure anticoagulation then spine surgery would not be possible so please call NSGY prior. Will see pt when she arrives.    FABRIZIO Munoz  
Occupational Therapy  01/08/24    Chart reviewed. Patient scheduled for ACDF for 1/9/24, will follow up POD #1 for OT evaluation as able/appropriate.     Thank you,   Jodie Hair OTNERISSA, OTR/L    
Occupational Therapy  01/09/24    Chart reviewed. Patient scheduled for ACDF today, will follow up POD #1 as able/appropriate.     Thank you,   Jodie Hair OTNERISSA, OTR/L    
Occupational Therapy  01/10/24    Orders received, chart reviewed and patient evaluated by occupational therapy. Pending progression with skilled acute occupational therapy, recommend:  Therapy 3 hours/day 5-7 days/week    Recommend with nursing patient to complete as able in order to maintain strength, endurance and independence: OOB to chair 3x/day, ADLs with assist, and mobilizing to the [facility] [place], [place] for toileting with 2 assist with gait belt & RW assist. Thank you for your assistance.     Full evaluation to follow.     Thank you,   AGUSTÍN Gallagher, OTR/L      
Occupational therapy  01/07/24     OT eval order received and acknowledged. Chart reviewed and noted patient was a transfer from outside hospital overnight due to cervical degenerative disc disease with cervical myelopathy. Plans for neurosurgical evaluation this AM and will hold until plan in place. Will continue to follow patient and see when able.    Thank you,  Jodie Webster, OTR/L   
PT Note:    Orders received and acknowledged.  Chart reviewed.  Noted MRI showed multilevel spinal stenosis and cord compression.  Patient awaiting neurosurgery consult.  Will hold and follow up as medically appropriate.  Thank you.  
Physical Therapy Note  01/08/2024    Chart reviewed in prep for PT eval; note imaging showing significant spinal cord compression with advancing myelopathy and neurosurgery plans for ACDF tomorrow, 1/9. Will plan to follow up POD1 for PT eval as appropriate.    Thank you,  Zandra Fox, PT, DPT  
Surgifoam given to sterile field  Ref:[id number]  Lot:[id number]  Exp:04/192027    FLOSEAL 10mL WAS GIVEN TO THE STERILE FIELD TO BE USED BY MD  REF: [id number]  LOT: [id number]  EXP: 10/03/2024      
CONTRAST    Result Date: 12/28/2023  No acute process on noncontrast CT.     CTA HEAD NECK W CONTRAST    Result Date: 12/28/2023  1.  No large vessel occlusion or significant flow-limiting stenosis. 2.  No evidence of perfusion defects.     CT BRAIN PERFUSION    Result Date: 12/28/2023  1.  No large vessel occlusion or significant flow-limiting stenosis. 2.  No evidence of perfusion defects.     XR CHEST PORTABLE    Result Date: 12/28/2023  No acute process on portable chest.         I have personally and independently reviewed all pertinent labs, diagnostic studies, imaging, and have provided independent interpretation of the same.     Labs:     Recent Labs     01/11/24 0109 01/12/24  0400   WBC 10.8 8.5   HGB 11.5 10.5*   HCT 34.0* 31.8*    213       Recent Labs     01/11/24 0109 01/12/24  0400    142   K 3.7 3.6   * 112*   CO2 23 23   BUN 16 13   MG 2.0 1.8   PHOS 2.7 2.9       No results for input(s): \"ALT\", \"TP\", \"ALB\", \"GLOB\", \"GGT\", \"AML\" in the last 72 hours.    Invalid input(s): \"SGOT\", \"GPT\", \"AP\", \"TBIL\", \"TBILI\", \"AMYP\", \"LPSE\", \"HLPSE\"    No results for input(s): \"INR\", \"APTT\" in the last 72 hours.    Invalid input(s): \"PTP\"   No results for input(s): \"TIBC\", \"FERR\" in the last 72 hours.    Invalid input(s): \"FE\", \"PSAT\"   No results found for: \"FOL\", \"RBCF\"   No results for input(s): \"PH\", \"PCO2\", \"PO2\" in the last 72 hours.  No results for input(s): \"CPK\" in the last 72 hours.    Invalid input(s): \"CPKMB\", \"CKNDX\", \"TROIQ\"  Lab Results   Component Value Date/Time    CHOL 170 12/29/2023 09:03 AM    HDL 35 12/29/2023 09:03 AM     No results found for: \"GLUCPOC\"    Notes reviewed from all clinical/nonclinical/nursing services involved in patient's clinical care. Care coordination discussions were held with appropriate clinical/nonclinical/ nursing providers based on care coordination needs.     Patients current active Medications were reviewed, considered, added and adjusted based on 
Categories: Obese Class 1 (BMI 30.0-34.9)    Wt Readings from Last 20 Encounters:   01/12/24 83.8 kg (184 lb 11.9 oz)   12/28/23 80.3 kg (177 lb)   01/03/24 80.3 kg (177 lb 0.5 oz)   12/29/23 80.3 kg (177 lb 0.5 oz)   07/26/23 82.6 kg (182 lb)   02/01/23 82.6 kg (182 lb)   12/07/22 80.9 kg (178 lb 6.4 oz)   10/07/22 80.7 kg (178 lb)   09/06/22 80.7 kg (178 lb)   07/21/22 82.3 kg (181 lb 6.4 oz)           Nutrition Diagnosis:   Inadequate protein-energy intake related to increase demand for energy/nutrients as evidenced by wounds, intake 0-25%    Nutrition Interventions:   Food and/or Nutrient Delivery: Modify Current Diet, Start Oral Nutrition Supplement  Nutrition Education/Counseling: No recommendation at this time  Coordination of Nutrition Care: No recommendation at this time       Goals:     Goals: PO intake 50% or greater, by next RD assessment       Nutrition Monitoring and Evaluation:   Behavioral-Environmental Outcomes: None Identified  Food/Nutrient Intake Outcomes: Diet Advancement/Tolerance, Food and Nutrient Intake, Supplement Intake  Physical Signs/Symptoms Outcomes: Biochemical Data, Weight    Discharge Planning:    Continue Oral Nutrition Supplement, Continue current diet     Dre Arellano RD  Available via [employer]  Office # [phone]      
CONTRAST    Result Date: 12/28/2023  No acute process on noncontrast CT.     CTA HEAD NECK W CONTRAST    Result Date: 12/28/2023  1.  No large vessel occlusion or significant flow-limiting stenosis. 2.  No evidence of perfusion defects.     CT BRAIN PERFUSION    Result Date: 12/28/2023  1.  No large vessel occlusion or significant flow-limiting stenosis. 2.  No evidence of perfusion defects.     XR CHEST PORTABLE    Result Date: 12/28/2023  No acute process on portable chest.         I have personally and independently reviewed all pertinent labs, diagnostic studies, imaging, and have provided independent interpretation of the same.     Labs:     Recent Labs     01/10/24  0220 01/11/24  0109   WBC 12.8* 10.8   HGB 13.0 11.5   HCT 38.8 34.0*    227       Recent Labs     01/10/24  0220 01/11/24  0109    140   K 4.6 3.7    111*   CO2 25 23   BUN 23* 16   MG 2.0 2.0   PHOS 4.5 2.7       No results for input(s): \"ALT\", \"TP\", \"ALB\", \"GLOB\", \"GGT\", \"AML\" in the last 72 hours.    Invalid input(s): \"SGOT\", \"GPT\", \"AP\", \"TBIL\", \"TBILI\", \"AMYP\", \"LPSE\", \"HLPSE\"    No results for input(s): \"INR\", \"APTT\" in the last 72 hours.    Invalid input(s): \"PTP\"   No results for input(s): \"TIBC\", \"FERR\" in the last 72 hours.    Invalid input(s): \"FE\", \"PSAT\"   No results found for: \"FOL\", \"RBCF\"   No results for input(s): \"PH\", \"PCO2\", \"PO2\" in the last 72 hours.  No results for input(s): \"CPK\" in the last 72 hours.    Invalid input(s): \"CPKMB\", \"CKNDX\", \"TROIQ\"  Lab Results   Component Value Date/Time    CHOL 170 12/29/2023 09:03 AM    HDL 35 12/29/2023 09:03 AM     No results found for: \"GLUCPOC\"    Notes reviewed from all clinical/nonclinical/nursing services involved in patient's clinical care. Care coordination discussions were held with appropriate clinical/nonclinical/ nursing providers based on care coordination needs.     Patients current active Medications were reviewed, considered, added and adjusted based on 
Dose Route Frequency    sodium chloride flush 0.9 % injection 5-40 mL  5-40 mL IntraVENous 2 times per day    sodium chloride flush 0.9 % injection 5-40 mL  5-40 mL IntraVENous PRN    0.9 % sodium chloride infusion   IntraVENous PRN    acetaminophen (TYLENOL) tablet 1,000 mg  1,000 mg Oral Q6H    morphine (PF) injection 2 mg  2 mg IntraVENous Q2H PRN    Or    morphine (PF) injection 4 mg  4 mg IntraVENous Q2H PRN    diphenhydrAMINE (BENADRYL) capsule 25 mg  25 mg Oral Q6H PRN    Or    diphenhydrAMINE (BENADRYL) injection 25 mg  25 mg IntraVENous Q6H PRN    ondansetron (ZOFRAN-ODT) disintegrating tablet 4 mg  4 mg Oral Q8H PRN    bisacodyl (DULCOLAX) suppository 10 mg  10 mg Rectal Daily PRN    magnesium hydroxide (MILK OF MAGNESIA) 400 MG/5ML suspension 30 mL  30 mL Oral Daily PRN    cyclobenzaprine (FLEXERIL) tablet 10 mg  10 mg Oral Q12H PRN    diazePAM (VALIUM) tablet 5 mg  5 mg Oral Q6H PRN    benzocaine-menthol (CEPACOL SORE THROAT) lozenge 1 lozenge  1 lozenge Oral Q2H PRN    phenol 1.4 % mouth spray 1 spray  1 spray Mouth/Throat Q2H PRN    HYDROcodone-acetaminophen (NORCO) 5-325 MG per tablet 1 tablet  1 tablet Oral Q6H PRN    HYDROcodone-acetaminophen (NORCO) 5-325 MG per tablet 2 tablet  2 tablet Oral Q6H PRN    sodium chloride flush 0.9 % injection 5-40 mL  5-40 mL IntraVENous 2 times per day    0.9 % sodium chloride infusion   IntraVENous PRN    potassium chloride (KLOR-CON) extended release tablet 40 mEq  40 mEq Oral PRN    Or    potassium bicarb-citric acid (EFFER-K) effervescent tablet 40 mEq  40 mEq Oral PRN    Or    potassium chloride 10 mEq/100 mL IVPB (Peripheral Line)  10 mEq IntraVENous PRN    ondansetron (ZOFRAN) injection 4 mg  4 mg IntraVENous Q6H PRN    polyethylene glycol (GLYCOLAX) packet 17 g  17 g Oral Daily PRN    acetaminophen (TYLENOL) tablet 650 mg  650 mg Oral Q6H PRN    Or    acetaminophen (TYLENOL) suppository 650 mg  650 mg Rectal Q6H PRN    ranolazine (RANEXA) extended release

## 2024-01-17 NOTE — CARE COORDINATION
Transition of Care Plan:    RUR: 16% Moderate Risk  Prior Level of Functioning: Independent  Disposition: Inpatient Acute Rehab; [facility]  If SNF or IPR: Date FOC offered: 12/31/2023  Date FOC received: 12/31/2023  Accepting facility: [facility]  Date authorization started with reference number: 12/31/2023  Date authorization received and expires:   Follow up appointments:   DME needed: None  Transportation at discharge: Stretcher  IM/IMM Medicare/ letter given: 1/6/2024  Is patient a [place] and connected with [facility]? No   If yes, was [place] transfer form completed and [facility] notified?   Caregiver Contact: Rickie Rodriguez  [phone]  Discharge Caregiver contacted prior to discharge? Yes  Care Conference needed? No  Barriers to discharge: Insurance auth    Piedad was called with [facility].  She stated they have not heard back from the appeal.  They are checking.    Piedad called back.  Apparently, the appeal was overturned, but the notification was sent to an obscure email.  They did not see it until today.  The overturned appeal had lapsed.  They are filing an urgent appeal.      Mrs. Fontenot was informed.      Will continue to follow for discharge planning.  SHERLYN DOWELL LCSW

## 2024-01-18 VITALS
OXYGEN SATURATION: 98 % | BODY MASS INDEX: 31.54 KG/M2 | TEMPERATURE: 97.7 F | WEIGHT: 184.75 LBS | HEIGHT: 64 IN | HEART RATE: 75 BPM | RESPIRATION RATE: 17 BRPM | DIASTOLIC BLOOD PRESSURE: 81 MMHG | SYSTOLIC BLOOD PRESSURE: 147 MMHG

## 2024-01-18 PROCEDURE — 6370000000 HC RX 637 (ALT 250 FOR IP): Performed by: SPECIALIST

## 2024-01-18 PROCEDURE — 6370000000 HC RX 637 (ALT 250 FOR IP): Performed by: STUDENT IN AN ORGANIZED HEALTH CARE EDUCATION/TRAINING PROGRAM

## 2024-01-18 PROCEDURE — 6370000000 HC RX 637 (ALT 250 FOR IP): Performed by: INTERNAL MEDICINE

## 2024-01-18 RX ORDER — ATORVASTATIN CALCIUM 40 MG/1
40 TABLET, FILM COATED ORAL NIGHTLY
Qty: 30 TABLET | Refills: 0 | Status: SHIPPED | OUTPATIENT
Start: 2024-01-18

## 2024-01-18 RX ORDER — METOPROLOL SUCCINATE 25 MG/1
25 TABLET, EXTENDED RELEASE ORAL DAILY
Qty: 30 TABLET | Refills: 0 | Status: SHIPPED | OUTPATIENT
Start: 2024-01-19

## 2024-01-18 RX ORDER — DIAZEPAM 5 MG/1
5 TABLET ORAL EVERY 6 HOURS PRN
Qty: 15 TABLET | Refills: 0 | Status: SHIPPED | OUTPATIENT
Start: 2024-01-18 | End: 2024-01-28

## 2024-01-18 RX ADMIN — DIAZEPAM 5 MG: 5 TABLET ORAL at 02:50

## 2024-01-18 RX ADMIN — POLYETHYLENE GLYCOL 3350 17 G: 17 POWDER, FOR SOLUTION ORAL at 10:32

## 2024-01-18 RX ADMIN — METOPROLOL SUCCINATE 25 MG: 25 TABLET, EXTENDED RELEASE ORAL at 10:32

## 2024-01-18 RX ADMIN — RANOLAZINE 500 MG: 500 TABLET, FILM COATED, EXTENDED RELEASE ORAL at 10:33

## 2024-01-18 RX ADMIN — DIAZEPAM 5 MG: 5 TABLET ORAL at 10:32

## 2024-01-18 RX ADMIN — ACETAMINOPHEN 1000 MG: 325 TABLET ORAL at 02:50

## 2024-01-18 RX ADMIN — ACETAMINOPHEN 1000 MG: 325 TABLET ORAL at 10:32

## 2024-01-18 RX ADMIN — Medication 1 LOZENGE: at 10:33

## 2024-01-18 NOTE — DISCHARGE SUMMARY
Discharge Summary       PATIENT ID: Yusra Fontenot  MRN: [id number]   YOB: 1955    DATE OF ADMISSION: 1/6/2024  7:33 PM    DATE OF DISCHARGE: 1/18/2024   PRIMARY CARE PROVIDER: Kavon Thurman MD     ATTENDING PHYSICIAN: Gus Evangelista  DISCHARGING PROVIDER: Gus Evangelista MD      CONSULTATIONS: IP CONSULT TO NEUROSURGERY  IP CONSULT TO PHYSICAL MEDICINE REHAB    PROCEDURES/SURGERIES: Procedure(s):  C3-4, C4-5, C5-6 ANTERIOR CERVICAL DISECTOMY AND FUSION    ADMISSION SUMMARY AND HOSPITAL COURSE:     Yusra Fontenot is a 68 y.o. female with history of hypertension depression and anxiety, GERD, dyslipidemia, CAD, cervical degenerative disc disease who was transferred from [facility] to [facility] for neurosurgical evaluation of cervical degenerative disc disease with cervical myelopathy.  Patient states she has had progressive weakness numbness and tingling in both her upper extremities but this has been more severe in her left upper extremity. States she eventually developed difficulty opening her left hand which prompted her to seek emergency room care.    Patient was admitted for further management.  MRI shows multilevel severe spinal stenosis, myelomalacia of cervical cord.,  Patient was seen by neurosurgery went for anterior cervical discectomy with spinal cord and nerve root decompression C3-6, anterior arthrodesis C3-C6, anterior cervical plating C4-C6, insertion titanium cage x3.  Patient did have left heart cath on 1/5 at the referring hospital prior to transfer to [facility], left heart cath shows 1/5 mid-LM 45%, mLAD 50-60%, 50% OM1, and a 60% RCA lesion.  Remained stable.  Patient was discharged to [facility].    DISCHARGE DIAGNOSES / PLAN:      BMI: Body mass index is 31.7 kg/m².. This patient: Has a BMI within normal limits.    PENDING TEST RESULTS:   At the time of discharge the following test results are still pending:      ADDITIONAL CARE RECOMMENDATIONS:

## 2024-01-18 NOTE — PLAN OF CARE
Problem: Chronic Conditions and Co-morbidities  Goal: Patient's chronic conditions and co-morbidity symptoms are monitored and maintained or improved  1/10/2024 1033 by Ananya Morgan RN  Outcome: Progressing  Flowsheets (Taken 1/10/2024 0800)  Care Plan - Patient's Chronic Conditions and Co-Morbidity Symptoms are Monitored and Maintained or Improved:   Monitor and assess patient's chronic conditions and comorbid symptoms for stability, deterioration, or improvement   Collaborate with multidisciplinary team to address chronic and comorbid conditions and prevent exacerbation or deterioration   Update acute care plan with appropriate goals if chronic or comorbid symptoms are exacerbated and prevent overall improvement and discharge  1/9/2024 2309 by Consuelo Stewart RN  Outcome: Progressing  1/9/2024 2059 by Stacey Carey RN  Outcome: Progressing  Flowsheets (Taken 1/9/2024 0800)  Care Plan - Patient's Chronic Conditions and Co-Morbidity Symptoms are Monitored and Maintained or Improved:   Monitor and assess patient's chronic conditions and comorbid symptoms for stability, deterioration, or improvement   Collaborate with multidisciplinary team to address chronic and comorbid conditions and prevent exacerbation or deterioration   Update acute care plan with appropriate goals if chronic or comorbid symptoms are exacerbated and prevent overall improvement and discharge     Problem: Safety - Adult  Goal: Free from fall injury  1/10/2024 1033 by Ananya Morgan RN  Outcome: Progressing  Flowsheets (Taken 1/10/2024 0800)  Free From Fall Injury: Instruct family/caregiver on patient safety  1/9/2024 2309 by Consuelo Stewart RN  Outcome: Progressing  1/9/2024 2059 by Stacey Carey, RN  Outcome: Progressing     Problem: Discharge Planning  Goal: Discharge to home or other facility with appropriate resources  1/10/2024 1033 by Ananya Morgan RN  Outcome: Progressing  Flowsheets (Taken 1/10/2024 0800)  Discharge to 
  Problem: Chronic Conditions and Co-morbidities  Goal: Patient's chronic conditions and co-morbidity symptoms are monitored and maintained or improved  1/11/2024 1131 by Ananya Morgan RN  Outcome: Progressing  Flowsheets (Taken 1/11/2024 0800)  Care Plan - Patient's Chronic Conditions and Co-Morbidity Symptoms are Monitored and Maintained or Improved:   Monitor and assess patient's chronic conditions and comorbid symptoms for stability, deterioration, or improvement   Collaborate with multidisciplinary team to address chronic and comorbid conditions and prevent exacerbation or deterioration   Update acute care plan with appropriate goals if chronic or comorbid symptoms are exacerbated and prevent overall improvement and discharge  1/11/2024 0005 by Consuelo Stewart RN  Outcome: Progressing     Problem: Safety - Adult  Goal: Free from fall injury  1/11/2024 1131 by Ananya Morgan RN  Outcome: Progressing  Flowsheets (Taken 1/11/2024 0800)  Free From Fall Injury: Instruct family/caregiver on patient safety  1/11/2024 0005 by Consuelo Stewart RN  Outcome: Progressing     Problem: Discharge Planning  Goal: Discharge to home or other facility with appropriate resources  1/11/2024 1131 by Ananya Morgan RN  Outcome: Progressing  Flowsheets (Taken 1/11/2024 0800)  Discharge to home or other facility with appropriate resources:   Identify barriers to discharge with patient and caregiver   Arrange for needed discharge resources and transportation as appropriate   Identify discharge learning needs (meds, wound care, etc)  1/11/2024 0005 by Consuelo Stewart RN  Outcome: Progressing     Problem: Skin/Tissue Integrity  Goal: Absence of new skin breakdown  Description: 1.  Monitor for areas of redness and/or skin breakdown  2.  Assess vascular access sites hourly  3.  Every 4-6 hours minimum:  Change oxygen saturation probe site  4.  Every 4-6 hours:  If on nasal continuous positive airway pressure, 
  Problem: Chronic Conditions and Co-morbidities  Goal: Patient's chronic conditions and co-morbidity symptoms are monitored and maintained or improved  1/13/2024 1607 by Concha Gallegos RN  Outcome: Progressing  1/13/2024 1514 by Concha Gallegos RN  Outcome: Progressing     Problem: Safety - Adult  Goal: Free from fall injury  1/13/2024 1607 by Concha Gallegos RN  Outcome: Progressing  1/13/2024 1514 by Concha Gallegos RN  Outcome: Progressing  1/13/2024 0410 by Jordyn Sawyer RN  Outcome: Progressing     Problem: Discharge Planning  Goal: Discharge to home or other facility with appropriate resources  1/13/2024 1607 by Concha Gallegos RN  Outcome: Progressing  1/13/2024 1514 by Concha Gallegos RN  Outcome: Progressing     Problem: Skin/Tissue Integrity  Goal: Absence of new skin breakdown  Description: 1.  Monitor for areas of redness and/or skin breakdown  2.  Assess vascular access sites hourly  3.  Every 4-6 hours minimum:  Change oxygen saturation probe site  4.  Every 4-6 hours:  If on nasal continuous positive airway pressure, respiratory therapy assess nares and determine need for appliance change or resting period.  1/13/2024 1607 by Concha Gallegos RN  Outcome: Progressing  1/13/2024 1514 by Concha Gallegos RN  Outcome: Progressing  1/13/2024 0410 by Jordyn Sawyer RN  Outcome: Progressing     Problem: Pain  Goal: Verbalizes/displays adequate comfort level or baseline comfort level  1/13/2024 1607 by Concha Gallegos RN  Outcome: Progressing  Flowsheets (Taken 1/13/2024 1607)  Verbalizes/displays adequate comfort level or baseline comfort level:   Encourage patient to monitor pain and request assistance   Administer analgesics based on type and severity of pain and evaluate response   Assess pain using appropriate pain scale   Notify Licensed Independent Practitioner if interventions unsuccessful or patient reports new pain   Implement non-pharmacological measures as appropriate 
  Problem: Chronic Conditions and Co-morbidities  Goal: Patient's chronic conditions and co-morbidity symptoms are monitored and maintained or improved  1/14/2024 0946 by Carmen Herrera, RN  Outcome: Progressing  1/13/2024 6565 by Viral Gallegos, RN  Outcome: Progressing     
  Problem: Chronic Conditions and Co-morbidities  Goal: Patient's chronic conditions and co-morbidity symptoms are monitored and maintained or improved  1/7/2024 2132 by Samra Rahman RN  Outcome: Progressing  Care Plan - Patient's Chronic Conditions and Co-Morbidity Symptoms are Monitored and Maintained or Improved: Monitor and assess patient's chronic conditions and comorbid symptoms for stability, deterioration, or improvement     Problem: Safety - Adult  Goal: Free from fall injury  1/7/2024 2132 by Samra Rahman RN  Outcome: Progressing  Free From Fall Injury: Instruct family/caregiver on patient safety     Problem: Discharge Planning  Goal: Discharge to home or other facility with appropriate resources  1/7/2024 2132 by Samra Rahman RN  Outcome: Progressing  Discharge to home or other facility with appropriate resources: Identify barriers to discharge with patient and caregiver     
  Problem: Chronic Conditions and Co-morbidities  Goal: Patient's chronic conditions and co-morbidity symptoms are monitored and maintained or improved  1/8/2024 2019 by Samra Rahman RN  Outcome: Progressing  Care Plan - Patient's Chronic Conditions and Co-Morbidity Symptoms are Monitored and Maintained or Improved:   Monitor and assess patient's chronic conditions and comorbid symptoms for stability, deterioration, or improvement   Collaborate with multidisciplinary team to address chronic and comorbid conditions and prevent exacerbation or deterioration   Update acute care plan with appropriate goals if chronic or comorbid symptoms are exacerbated and prevent overall improvement and discharge     Problem: Safety - Adult  Goal: Free from fall injury  1/8/2024 2019 by Samra Rahman RN  Outcome: Progressing  Free From Fall Injury: Instruct family/caregiver on patient safety     Problem: Discharge Planning  Goal: Discharge to home or other facility with appropriate resources  1/8/2024 2019 by Samra Rahman, RN  Outcome: Progressing  Discharge to home or other facility with appropriate resources: Identify barriers to discharge with patient and caregiver     Problem: Skin/Tissue Integrity  Goal: Absence of new skin breakdown  Description: 1.  Monitor for areas of redness and/or skin breakdown  2.  Assess vascular access sites hourly  3.  Every 4-6 hours minimum:  Change oxygen saturation probe site  4.  Every 4-6 hours:  If on nasal continuous positive airway pressure, respiratory therapy assess nares and determine need for appliance change or resting period.  1/8/2024 2019 by Samra Rahman, RN  Outcome: Progressing    
  Problem: Chronic Conditions and Co-morbidities  Goal: Patient's chronic conditions and co-morbidity symptoms are monitored and maintained or improved  Outcome: Progressing     Problem: Safety - Adult  Goal: Free from fall injury  1/13/2024 1514 by Concha Gallegos RN  Outcome: Progressing  1/13/2024 0410 by Jordyn Sawyer RN  Outcome: Progressing     Problem: Discharge Planning  Goal: Discharge to home or other facility with appropriate resources  Outcome: Progressing     Problem: Skin/Tissue Integrity  Goal: Absence of new skin breakdown  Description: 1.  Monitor for areas of redness and/or skin breakdown  2.  Assess vascular access sites hourly  3.  Every 4-6 hours minimum:  Change oxygen saturation probe site  4.  Every 4-6 hours:  If on nasal continuous positive airway pressure, respiratory therapy assess nares and determine need for appliance change or resting period.  1/13/2024 1514 by Concha Gallegos RN  Outcome: Progressing  1/13/2024 0410 by Jordyn Sawyer RN  Outcome: Progressing     Problem: Pain  Goal: Verbalizes/displays adequate comfort level or baseline comfort level  Outcome: Progressing     Problem: Occupational Therapy - Adult  Goal: By Discharge: Performs self-care activities at highest level of function for planned discharge setting.  See evaluation for individualized goals.  Description: FUNCTIONAL STATUS PRIOR TO ADMISSION:  Prior to 3-4 weeks ago, she was IND with SPC use, driving, grocery shopping. She reports decreased acuity & blurry vision in L eye prior to acute weakness, hx fo cataracts in family but has not been to the optometrist to assess.     HOME SUPPORT: Patient lived with her  but didn't require assistance.     Occupational Therapy Goals  Initiated 1/10/2024    1.  Patient will perform upper & lower body dressing with Moderate Assist using within 7 days.  2.  Patient will perform grooming at the sink with Moderate Assist within 7 days.   3.  Patient will standing 
  Problem: Chronic Conditions and Co-morbidities  Goal: Patient's chronic conditions and co-morbidity symptoms are monitored and maintained or improved  Outcome: Progressing     Problem: Safety - Adult  Goal: Free from fall injury  Outcome: Progressing     Problem: Discharge Planning  Goal: Discharge to home or other facility with appropriate resources  Outcome: Progressing     Problem: Skin/Tissue Integrity  Goal: Absence of new skin breakdown  Description: 1.  Monitor for areas of redness and/or skin breakdown  2.  Assess vascular access sites hourly  3.  Every 4-6 hours minimum:  Change oxygen saturation probe site  4.  Every 4-6 hours:  If on nasal continuous positive airway pressure, respiratory therapy assess nares and determine need for appliance change or resting period.  Outcome: Progressing     Problem: Pain  Goal: Verbalizes/displays adequate comfort level or baseline comfort level  Outcome: Progressing     
  Problem: Chronic Conditions and Co-morbidities  Goal: Patient's chronic conditions and co-morbidity symptoms are monitored and maintained or improved  Outcome: Progressing     Problem: Safety - Adult  Goal: Free from fall injury  Outcome: Progressing     Problem: Discharge Planning  Goal: Discharge to home or other facility with appropriate resources  Outcome: Progressing     Problem: Skin/Tissue Integrity  Goal: Absence of new skin breakdown  Description: 1.  Monitor for areas of redness and/or skin breakdown  2.  Assess vascular access sites hourly  3.  Every 4-6 hours minimum:  Change oxygen saturation probe site  4.  Every 4-6 hours:  If on nasal continuous positive airway pressure, respiratory therapy assess nares and determine need for appliance change or resting period.  Outcome: Progressing     Problem: Pain  Goal: Verbalizes/displays adequate comfort level or baseline comfort level  Outcome: Progressing     
  Problem: Chronic Conditions and Co-morbidities  Goal: Patient's chronic conditions and co-morbidity symptoms are monitored and maintained or improved  Outcome: Progressing  Flowsheets (Taken 1/7/2024 0800)  Care Plan - Patient's Chronic Conditions and Co-Morbidity Symptoms are Monitored and Maintained or Improved: Monitor and assess patient's chronic conditions and comorbid symptoms for stability, deterioration, or improvement     Problem: Safety - Adult  Goal: Free from fall injury  Outcome: Progressing  Flowsheets (Taken 1/7/2024 0800)  Free From Fall Injury: Instruct family/caregiver on patient safety     Problem: Discharge Planning  Goal: Discharge to home or other facility with appropriate resources  Outcome: Progressing  Flowsheets (Taken 1/7/2024 0800)  Discharge to home or other facility with appropriate resources: Identify barriers to discharge with patient and caregiver     
  Problem: Chronic Conditions and Co-morbidities  Goal: Patient's chronic conditions and co-morbidity symptoms are monitored and maintained or improved  Outcome: Progressing  Flowsheets (Taken 1/8/2024 0800)  Care Plan - Patient's Chronic Conditions and Co-Morbidity Symptoms are Monitored and Maintained or Improved:   Monitor and assess patient's chronic conditions and comorbid symptoms for stability, deterioration, or improvement   Collaborate with multidisciplinary team to address chronic and comorbid conditions and prevent exacerbation or deterioration   Update acute care plan with appropriate goals if chronic or comorbid symptoms are exacerbated and prevent overall improvement and discharge     Problem: Safety - Adult  Goal: Free from fall injury  Outcome: Progressing  Flowsheets (Taken 1/8/2024 0800)  Free From Fall Injury: Instruct family/caregiver on patient safety     Problem: Discharge Planning  Goal: Discharge to home or other facility with appropriate resources  Outcome: Progressing  Flowsheets (Taken 1/8/2024 0800)  Discharge to home or other facility with appropriate resources: Identify barriers to discharge with patient and caregiver     Problem: Skin/Tissue Integrity  Goal: Absence of new skin breakdown  Description: 1.  Monitor for areas of redness and/or skin breakdown  2.  Assess vascular access sites hourly  3.  Every 4-6 hours minimum:  Change oxygen saturation probe site  4.  Every 4-6 hours:  If on nasal continuous positive airway pressure, respiratory therapy assess nares and determine need for appliance change or resting period.  Outcome: Progressing     
  Problem: Chronic Conditions and Co-morbidities  Goal: Patient's chronic conditions and co-morbidity symptoms are monitored and maintained or improved  Outcome: Progressing  Flowsheets (Taken 1/9/2024 0800)  Care Plan - Patient's Chronic Conditions and Co-Morbidity Symptoms are Monitored and Maintained or Improved:   Monitor and assess patient's chronic conditions and comorbid symptoms for stability, deterioration, or improvement   Collaborate with multidisciplinary team to address chronic and comorbid conditions and prevent exacerbation or deterioration   Update acute care plan with appropriate goals if chronic or comorbid symptoms are exacerbated and prevent overall improvement and discharge     Problem: Safety - Adult  Goal: Free from fall injury  Outcome: Progressing     Problem: Discharge Planning  Goal: Discharge to home or other facility with appropriate resources  Outcome: Progressing  Flowsheets (Taken 1/9/2024 0800)  Discharge to home or other facility with appropriate resources: Identify barriers to discharge with patient and caregiver     Problem: Skin/Tissue Integrity  Goal: Absence of new skin breakdown  Description: 1.  Monitor for areas of redness and/or skin breakdown  2.  Assess vascular access sites hourly  3.  Every 4-6 hours minimum:  Change oxygen saturation probe site  4.  Every 4-6 hours:  If on nasal continuous positive airway pressure, respiratory therapy assess nares and determine need for appliance change or resting period.  Outcome: Progressing     Problem: Pain  Goal: Verbalizes/displays adequate comfort level or baseline comfort level  Outcome: Progressing  Flowsheets (Taken 1/9/2024 0800)  Verbalizes/displays adequate comfort level or baseline comfort level: Encourage patient to monitor pain and request assistance     
  Problem: Occupational Therapy - Adult  Goal: By Discharge: Performs self-care activities at highest level of function for planned discharge setting.  See evaluation for individualized goals.  Description: FUNCTIONAL STATUS PRIOR TO ADMISSION:  Prior to 3-4 weeks ago, she was IND with SPC use, driving, grocery shopping. She reports decreased acuity & blurry vision in L eye prior to acute weakness, hx fo cataracts in family but has not been to the optometrist to assess.     HOME SUPPORT: Patient lived with her  but didn't require assistance.     Occupational Therapy Goals  Initiated 1/10/2024    1.  Patient will perform upper & lower body dressing with Moderate Assist using within 7 days.  2.  Patient will perform grooming at the sink with Moderate Assist within 7 days.   3.  Patient will standing ADLs 5 mins at Moderate Assist within 7 days.   4.  Patient will don/doff neck brace at Moderate Assist within 7 days.  5.  Patient will verbalize/demonstrate 3/3 cervical precautions during ADL tasks without cues within 7 days.    6.  Patient will perform toilet transfers to/from [facility] [place] with Moderate Assist  within 7 day(s).  7.  Patient will perform all aspects of toileting with Moderate Assist within 7 day(s).  8.  Patient will participate in upper extremity therapeutic exercise/activities with Supervision for 10 minutes within 7 day(s).        Outcome: Progressing   OCCUPATIONAL THERAPY TREATMENT  Patient: Yusra Fontenot (68 y.o. female)  Date: 1/13/2024  Primary Diagnosis: Cervical radiculopathy [M54.12]  Procedure(s) (LRB):  C3-4, C4-5, C5-6 ANTERIOR CERVICAL DISECTOMY AND FUSION (N/A) 4 Days Post-Op   Precautions: Fall Risk                Chart, occupational therapy assessment, plan of care, and goals were reviewed.    ASSESSMENT  Patient continues to benefit from skilled OT services and is progressing towards goals. Pt presented supine in bed agreeable to therapy. Pt is limited by impaired standing balance, 
  Problem: Occupational Therapy - Adult  Goal: By Discharge: Performs self-care activities at highest level of function for planned discharge setting.  See evaluation for individualized goals.  Description: FUNCTIONAL STATUS PRIOR TO ADMISSION:  Prior to 3-4 weeks ago, she was IND with SPC use, driving, grocery shopping. She reports decreased acuity & blurry vision in L eye prior to acute weakness, hx fo cataracts in family but has not been to the optometrist to assess.     HOME SUPPORT: Patient lived with her  but didn't require assistance.     Occupational Therapy Goals  Initiated 1/10/2024    1.  Patient will perform upper & lower body dressing with Moderate Assist using within 7 days.  2.  Patient will perform grooming at the sink with Moderate Assist within 7 days.   3.  Patient will standing ADLs 5 mins at Moderate Assist within 7 days.   4.  Patient will don/doff neck brace at Moderate Assist within 7 days.  5.  Patient will verbalize/demonstrate 3/3 cervical precautions during ADL tasks without cues within 7 days.    6.  Patient will perform toilet transfers to/from [facility] [place] with Moderate Assist  within 7 day(s).  7.  Patient will perform all aspects of toileting with Moderate Assist within 7 day(s).  8.  Patient will participate in upper extremity therapeutic exercise/activities with Supervision for 10 minutes within 7 day(s).        Outcome: Progressing   OCCUPATIONAL THERAPY TREATMENT  Patient: Yusra Fontenot (68 y.o. female)  Date: 1/15/2024  Primary Diagnosis: Cervical radiculopathy [M54.12]  Procedure(s) (LRB):  C3-4, C4-5, C5-6 ANTERIOR CERVICAL DISECTOMY AND FUSION (N/A) 6 Days Post-Op   Precautions: Fall Risk                Chart, occupational therapy assessment, plan of care, and goals were reviewed.    ASSESSMENT  Patient continues to benefit from skilled OT services and is progressing towards goals. Pt presents at min assist to mod/max assist with mobility and self-care. Pt continues to be 
  Problem: Occupational Therapy - Adult  Goal: By Discharge: Performs self-care activities at highest level of function for planned discharge setting.  See evaluation for individualized goals.  Description: FUNCTIONAL STATUS PRIOR TO ADMISSION:  Prior to 3-4 weeks ago, she was IND with SPC use, driving, grocery shopping. She reports decreased acuity & blurry vision in L eye prior to acute weakness, hx fo cataracts in family but has not been to the optometrist to assess.     HOME SUPPORT: Patient lived with her  but didn't require assistance.     Occupational Therapy Goals  Initiated 1/10/2024    1.  Patient will perform upper & lower body dressing with Moderate Assist using within 7 days.  2.  Patient will perform grooming at the sink with Moderate Assist within 7 days.   3.  Patient will standing ADLs 5 mins at Moderate Assist within 7 days.   4.  Patient will don/doff neck brace at Moderate Assist within 7 days.  5.  Patient will verbalize/demonstrate 3/3 cervical precautions during ADL tasks without cues within 7 days.    6.  Patient will perform toilet transfers to/from [facility] [place] with Moderate Assist  within 7 day(s).  7.  Patient will perform all aspects of toileting with Moderate Assist within 7 day(s).  8.  Patient will participate in upper extremity therapeutic exercise/activities with Supervision for 10 minutes within 7 day(s).        Outcome: Progressing     OCCUPATIONAL THERAPY TREATMENT  Patient: Yusra Fontenot (68 y.o. female)  Date: 1/16/2024  Primary Diagnosis: Cervical radiculopathy [M54.12]  Procedure(s) (LRB):  C3-4, C4-5, C5-6 ANTERIOR CERVICAL DISECTOMY AND FUSION (N/A) 7 Days Post-Op   Precautions: Fall Risk                Chart, occupational therapy assessment, plan of care, and goals were reviewed.    ASSESSMENT  Patient continues to benefit from skilled OT services and is progressing towards goals. Pt cleared for therapy by nursing and received sitting upright in chair agreeable to 
  Problem: Physical Therapy - Adult  Goal: By Discharge: Performs mobility at highest level of function for planned discharge setting.  See evaluation for individualized goals.  Description: FUNCTIONAL STATUS PRIOR TO ADMISSION: Patient was independent and active without use of DME prior to progressive weakness and functional decline over the last 3-4 weeks; began using a RW and required assist from her spouse.    HOME SUPPORT PRIOR TO ADMISSION: The patient lived with her spouse.    Physical Therapy Goals  Initiated 1/10/2024  1.  Patient will move from supine to sit and sit to supine and scoot up and down in bed with minimal assistance within 4 day(s).    2.  Patient will perform sit to stand with minimal assistance within 4 day(s).  3.  Patient will transfer from bed to chair and chair to bed with minimal assistance using the least restrictive device within 4 day(s).  4.  Patient will ambulate with minimal assistance for 15 feet with the least restrictive device within 4 day(s).   5. Patient will verbalize and demonstrate understanding of spinal precautions (No bending, lifting greater than 5 lbs, or twisting; log-roll technique; frequent repositioning as instructed) within 4 days.    1/10/2024 1625 by Zandra Fox, PT  Outcome: Progressing  1/10/2024 1240 by Zandra Fox, PT  Outcome: Progressing     PHYSICAL THERAPY TREATMENT    Patient: Yusra Fontenot (68 y.o. female)  Date: 1/10/2024  Diagnosis: Cervical radiculopathy [M54.12] Cervical radiculopathy  Procedure(s) (LRB):  C3-4, C4-5, C5-6 ANTERIOR CERVICAL DISECTOMY AND FUSION (N/A) 1 Day Post-Op  Precautions: Fall Risk                    ASSESSMENT:  Patient continues to benefit from skilled PT services and is progressing towards goals. Pt seen for PM visit; received up in chair requesting assist back to bed. Continues to report minimal and tolerable pain. Instructed pt in seated B LE there ex to complete while up. Completed sit<>stand to RW with mod 
  Problem: Physical Therapy - Adult  Goal: By Discharge: Performs mobility at highest level of function for planned discharge setting.  See evaluation for individualized goals.  Description: FUNCTIONAL STATUS PRIOR TO ADMISSION: Patient was independent and active without use of DME prior to progressive weakness and functional decline over the last 3-4 weeks; began using a RW and required assist from her spouse.    HOME SUPPORT PRIOR TO ADMISSION: The patient lived with her spouse.    Physical Therapy Goals  Initiated 1/10/2024  1.  Patient will move from supine to sit and sit to supine and scoot up and down in bed with minimal assistance within 4 day(s).    2.  Patient will perform sit to stand with minimal assistance within 4 day(s).  3.  Patient will transfer from bed to chair and chair to bed with minimal assistance using the least restrictive device within 4 day(s).  4.  Patient will ambulate with minimal assistance for 15 feet with the least restrictive device within 4 day(s).   5. Patient will verbalize and demonstrate understanding of spinal precautions (No bending, lifting greater than 5 lbs, or twisting; log-roll technique; frequent repositioning as instructed) within 4 days.    1/12/2024 1608 by Zandra Fox, PT  Outcome: Progressing  1/12/2024 1329 by Zandra Fox, PT  Outcome: Progressing     PHYSICAL THERAPY TREATMENT - PM Session    Patient: Yusra Fontenot (68 y.o. female)  Date: 1/12/2024  Diagnosis: Cervical radiculopathy [M54.12] Cervical radiculopathy  Procedure(s) (LRB):  C3-4, C4-5, C5-6 ANTERIOR CERVICAL DISECTOMY AND FUSION (N/A) 3 Days Post-Op  Precautions: Fall Risk                    ASSESSMENT:  Patient continues to benefit from skilled PT services and is progressing towards goals; pt seen for PM session, now transferred to new room assignment. Remains most limited by weakness/debility (L UE/LE more pronounced than R), impaired coordination, impaired balance, impaired gait, post op 
  Problem: Physical Therapy - Adult  Goal: By Discharge: Performs mobility at highest level of function for planned discharge setting.  See evaluation for individualized goals.  Description: FUNCTIONAL STATUS PRIOR TO ADMISSION: Patient was independent and active without use of DME prior to progressive weakness and functional decline over the last 3-4 weeks; began using a RW and required assist from her spouse.    HOME SUPPORT PRIOR TO ADMISSION: The patient lived with her spouse.    Physical Therapy Goals  Initiated 1/10/2024  1.  Patient will move from supine to sit and sit to supine and scoot up and down in bed with minimal assistance within 4 day(s).    2.  Patient will perform sit to stand with minimal assistance within 4 day(s).  3.  Patient will transfer from bed to chair and chair to bed with minimal assistance using the least restrictive device within 4 day(s).  4.  Patient will ambulate with minimal assistance for 15 feet with the least restrictive device within 4 day(s).   5. Patient will verbalize and demonstrate understanding of spinal precautions (No bending, lifting greater than 5 lbs, or twisting; log-roll technique; frequent repositioning as instructed) within 4 days.    1/13/2024 1332 by Yakelin Girard, PT  Outcome: Progressing   PHYSICAL THERAPY TREATMENT    Patient: Yusra Fontenot (68 y.o. female)  Date: 1/13/2024  Diagnosis: Cervical radiculopathy [M54.12] Cervical radiculopathy  Procedure(s) (LRB):  C3-4, C4-5, C5-6 ANTERIOR CERVICAL DISECTOMY AND FUSION (N/A) 4 Days Post-Op  Precautions: Fall Risk                    ASSESSMENT:  Patient continues to benefit from skilled PT services and is slowly progressing towards goals. PM treatment is focused on improved control and stability of L LE. Patient performs sit<>stand x5 with Max-Total blocking of R LE to maintain neutral stance. Provided Max A patient is able to maintain neutral R LE until she has achieved full extension. She performs weight 
  Problem: Physical Therapy - Adult  Goal: By Discharge: Performs mobility at highest level of function for planned discharge setting.  See evaluation for individualized goals.  Description: FUNCTIONAL STATUS PRIOR TO ADMISSION: Patient was independent and active without use of DME prior to progressive weakness and functional decline over the last 3-4 weeks; began using a RW and required assist from her spouse.    HOME SUPPORT PRIOR TO ADMISSION: The patient lived with her spouse.    Physical Therapy Goals  Initiated 1/10/2024  1.  Patient will move from supine to sit and sit to supine and scoot up and down in bed with minimal assistance within 4 day(s).    2.  Patient will perform sit to stand with minimal assistance within 4 day(s).  3.  Patient will transfer from bed to chair and chair to bed with minimal assistance using the least restrictive device within 4 day(s).  4.  Patient will ambulate with minimal assistance for 15 feet with the least restrictive device within 4 day(s).   5. Patient will verbalize and demonstrate understanding of spinal precautions (No bending, lifting greater than 5 lbs, or twisting; log-roll technique; frequent repositioning as instructed) within 4 days.    1/14/2024 1400 by Maria Isabel Rick, PT  Outcome: Progressing   PHYSICAL THERAPY TREATMENT    Patient: Yusra Fontenot (68 y.o. female)  Date: 1/14/2024  Diagnosis: Cervical radiculopathy [M54.12] Cervical radiculopathy  Procedure(s) (LRB):  C3-4, C4-5, C5-6 ANTERIOR CERVICAL DISECTOMY AND FUSION (N/A) 5 Days Post-Op  Precautions: Fall Risk                    ASSESSMENT:  Patient continues to benefit from skilled PT services and is progressing towards goals. For second session, worked on sit to stand to sit practice, standing without hyperextension in the L knee, gait and hamstring strengthening.  With cues and practice, she is better able to move to standing without the use of her legs against the chair, but she fatigues quickly 
  Problem: Physical Therapy - Adult  Goal: By Discharge: Performs mobility at highest level of function for planned discharge setting.  See evaluation for individualized goals.  Description: FUNCTIONAL STATUS PRIOR TO ADMISSION: Patient was independent and active without use of DME prior to progressive weakness and functional decline over the last 3-4 weeks; began using a RW and required assist from her spouse.    HOME SUPPORT PRIOR TO ADMISSION: The patient lived with her spouse.    Physical Therapy Goals  Initiated 1/10/2024  1.  Patient will move from supine to sit and sit to supine and scoot up and down in bed with minimal assistance within 4 day(s).    2.  Patient will perform sit to stand with minimal assistance within 4 day(s).  3.  Patient will transfer from bed to chair and chair to bed with minimal assistance using the least restrictive device within 4 day(s).  4.  Patient will ambulate with minimal assistance for 15 feet with the least restrictive device within 4 day(s).   5. Patient will verbalize and demonstrate understanding of spinal precautions (No bending, lifting greater than 5 lbs, or twisting; log-roll technique; frequent repositioning as instructed) within 4 days.    Outcome: Progressing     PHYSICAL THERAPY EVALUATION    Patient: Yusra Fontenot (68 y.o. female)  Date: 1/10/2024  Primary Diagnosis: Cervical radiculopathy [M54.12]  Procedure(s) (LRB):  C3-4, C4-5, C5-6 ANTERIOR CERVICAL DISECTOMY AND FUSION (N/A) 1 Day Post-Op   Precautions: Fall Risk               Required Braces or Orthoses  Cervical: c-collar      ASSESSMENT :   DEFICITS/IMPAIRMENTS:   The patient presents with impaired functional mobility as compared to her baseline level 2* global weakness/debility (L UE/LE more pronounced than R), impaired coordination, impaired balance, impaired gait, post op pain and restrictions, and decreased tolerance to activity following transfer from OSH for progressive B LE weakness; found to 
  Problem: Physical Therapy - Adult  Goal: By Discharge: Performs mobility at highest level of function for planned discharge setting.  See evaluation for individualized goals.  Description: FUNCTIONAL STATUS PRIOR TO ADMISSION: Patient was independent and active without use of DME prior to progressive weakness and functional decline over the last 3-4 weeks; began using a RW and required assist from her spouse.    HOME SUPPORT PRIOR TO ADMISSION: The patient lived with her spouse.    Physical Therapy Goals  Initiated 1/10/2024  1.  Patient will move from supine to sit and sit to supine and scoot up and down in bed with minimal assistance within 4 day(s).    2.  Patient will perform sit to stand with minimal assistance within 4 day(s).  3.  Patient will transfer from bed to chair and chair to bed with minimal assistance using the least restrictive device within 4 day(s).  4.  Patient will ambulate with minimal assistance for 15 feet with the least restrictive device within 4 day(s).   5. Patient will verbalize and demonstrate understanding of spinal precautions (No bending, lifting greater than 5 lbs, or twisting; log-roll technique; frequent repositioning as instructed) within 4 days.    Outcome: Progressing   PHYSICAL THERAPY TREATMENT    Patient: Yusra Fontenot (68 y.o. female)  Date: 1/11/2024  Diagnosis: Cervical radiculopathy [M54.12] Cervical radiculopathy  Procedure(s) (LRB):  C3-4, C4-5, C5-6 ANTERIOR CERVICAL DISECTOMY AND FUSION (N/A) 2 Days Post-Op  Precautions: Fall Risk                    ASSESSMENT:  Patient continues to benefit from skilled PT services and is progressing towards goals. Pt tolerated session well, but continues to be limited by LE weakness (R>L), decreased functional mobility, impaired balance and gait, increased risk for falls and dependency. Pt with multiple bowel movements during session requiring assist for pericare. Pt required min A x 2 transfers with RW and cueing for proper 
  Problem: Physical Therapy - Adult  Goal: By Discharge: Performs mobility at highest level of function for planned discharge setting.  See evaluation for individualized goals.  Description: FUNCTIONAL STATUS PRIOR TO ADMISSION: Patient was independent and active without use of DME prior to progressive weakness and functional decline over the last 3-4 weeks; began using a RW and required assist from her spouse.    HOME SUPPORT PRIOR TO ADMISSION: The patient lived with her spouse.    Physical Therapy Goals  Initiated 1/10/2024  1.  Patient will move from supine to sit and sit to supine and scoot up and down in bed with minimal assistance within 4 day(s).    2.  Patient will perform sit to stand with minimal assistance within 4 day(s).  3.  Patient will transfer from bed to chair and chair to bed with minimal assistance using the least restrictive device within 4 day(s).  4.  Patient will ambulate with minimal assistance for 15 feet with the least restrictive device within 4 day(s).   5. Patient will verbalize and demonstrate understanding of spinal precautions (No bending, lifting greater than 5 lbs, or twisting; log-roll technique; frequent repositioning as instructed) within 4 days.    Outcome: Progressing   PHYSICAL THERAPY TREATMENT    Patient: Yusra Fontenot (68 y.o. female)  Date: 1/13/2024  Diagnosis: Cervical radiculopathy [M54.12] Cervical radiculopathy  Procedure(s) (LRB):  C3-4, C4-5, C5-6 ANTERIOR CERVICAL DISECTOMY AND FUSION (N/A) 4 Days Post-Op  Precautions: Fall Risk                    ASSESSMENT:  Patient continues to benefit from skilled PT services and is slowly progressing towards goals. She continues to demonstrate L LE hyperextension in instability. Session is focused on increasing gait distance secondary to pain/discomfort and complaints of constipation. Patient is provided Min-Mod A to stabilize her trunk and Max A in order to attempt to stabilize the L LE. Patient is profoundly weak and 
  Problem: Physical Therapy - Adult  Goal: By Discharge: Performs mobility at highest level of function for planned discharge setting.  See evaluation for individualized goals.  Description: FUNCTIONAL STATUS PRIOR TO ADMISSION: Patient was independent and active without use of DME prior to progressive weakness and functional decline over the last 3-4 weeks; began using a RW and required assist from her spouse.    HOME SUPPORT PRIOR TO ADMISSION: The patient lived with her spouse.    Physical Therapy Goals  Initiated 1/10/2024  1.  Patient will move from supine to sit and sit to supine and scoot up and down in bed with minimal assistance within 4 day(s).    2.  Patient will perform sit to stand with minimal assistance within 4 day(s).  3.  Patient will transfer from bed to chair and chair to bed with minimal assistance using the least restrictive device within 4 day(s).  4.  Patient will ambulate with minimal assistance for 15 feet with the least restrictive device within 4 day(s).   5. Patient will verbalize and demonstrate understanding of spinal precautions (No bending, lifting greater than 5 lbs, or twisting; log-roll technique; frequent repositioning as instructed) within 4 days.    Outcome: Progressing   PHYSICAL THERAPY TREATMENT    Patient: Yusra Fontenot (68 y.o. female)  Date: 1/15/2024  Diagnosis: Cervical radiculopathy [M54.12] Cervical radiculopathy  Procedure(s) (LRB):  C3-4, C4-5, C5-6 ANTERIOR CERVICAL DISECTOMY AND FUSION (N/A) 6 Days Post-Op  Precautions: Fall Risk                    ASSESSMENT:  Patient continues to benefit from skilled PT services and is progressing towards goals. Patient continues to be limited by L sided weakness, impaired balance, gait instability, generalized weakness, and decreased activity tolerance. Patient overall Geo for transfers.  Able to amb approx 20 feet x 2 with RW and Geo with severe L LE recurvatum with each step and unable to control knee.  If she does not 
  Problem: Physical Therapy - Adult  Goal: By Discharge: Performs mobility at highest level of function for planned discharge setting.  See evaluation for individualized goals.  Description: FUNCTIONAL STATUS PRIOR TO ADMISSION: Patient was independent and active without use of DME prior to progressive weakness and functional decline over the last 3-4 weeks; began using a RW and required assist from her spouse.    HOME SUPPORT PRIOR TO ADMISSION: The patient lived with her spouse.    Physical Therapy Goals  Initiated 1/10/2024  1.  Patient will move from supine to sit and sit to supine and scoot up and down in bed with minimal assistance within 4 day(s).    2.  Patient will perform sit to stand with minimal assistance within 4 day(s).  3.  Patient will transfer from bed to chair and chair to bed with minimal assistance using the least restrictive device within 4 day(s).  4.  Patient will ambulate with minimal assistance for 15 feet with the least restrictive device within 4 day(s).   5. Patient will verbalize and demonstrate understanding of spinal precautions (No bending, lifting greater than 5 lbs, or twisting; log-roll technique; frequent repositioning as instructed) within 4 days.    Outcome: Progressing   PHYSICAL THERAPY TREATMENT    Patient: Yusra Fontenot (68 y.o. female)  Date: 1/16/2024  Diagnosis: Cervical radiculopathy [M54.12] Cervical radiculopathy  Procedure(s) (LRB):  C3-4, C4-5, C5-6 ANTERIOR CERVICAL DISECTOMY AND FUSION (N/A) 7 Days Post-Op  Precautions: Fall Risk                    ASSESSMENT:  Patient continues to benefit from skilled PT services and is progressing towards goals. Patient continues with limited by pain, gait instability, L LE buckling or hyperextension, gait ataxia and decreased activity tolerance. Currently needing Geo for bed mobility and Geo for transfers.  Amb approx 25 ft x 2 with slow, deliberate gait pattern with intermittent knee recurvatum and has buckling if she 
  Problem: Safety - Adult  Goal: Free from fall injury  Outcome: Progressing     Problem: Skin/Tissue Integrity  Goal: Absence of new skin breakdown  Description: 1.  Monitor for areas of redness and/or skin breakdown  2.  Assess vascular access sites hourly  3.  Every 4-6 hours minimum:  Change oxygen saturation probe site  4.  Every 4-6 hours:  If on nasal continuous positive airway pressure, respiratory therapy assess nares and determine need for appliance change or resting period.  Outcome: Progressing     
  Problem: Safety - Adult  Goal: Free from fall injury  Outcome: Progressing     Problem: Skin/Tissue Integrity  Goal: Absence of new skin breakdown  Description: 1.  Monitor for areas of redness and/or skin breakdown  2.  Assess vascular access sites hourly  3.  Every 4-6 hours minimum:  Change oxygen saturation probe site  4.  Every 4-6 hours:  If on nasal continuous positive airway pressure, respiratory therapy assess nares and determine need for appliance change or resting period.  Outcome: Progressing     Problem: Pain  Goal: Verbalizes/displays adequate comfort level or baseline comfort level  Outcome: Progressing     
  Problem: Safety - Adult  Goal: Free from fall injury  Outcome: Progressing     Problem: Skin/Tissue Integrity  Goal: Absence of new skin breakdown  Description: 1.  Monitor for areas of redness and/or skin breakdown  2.  Assess vascular access sites hourly  3.  Every 4-6 hours minimum:  Change oxygen saturation probe site  4.  Every 4-6 hours:  If on nasal continuous positive airway pressure, respiratory therapy assess nares and determine need for appliance change or resting period.  Outcome: Progressing     Problem: Pain  Goal: Verbalizes/displays adequate comfort level or baseline comfort level  Outcome: Progressing     
  Problem: Safety - Adult  Goal: Free from fall injury  Outcome: Progressing     Problem: Skin/Tissue Integrity  Goal: Absence of new skin breakdown  Description: 1.  Monitor for areas of redness and/or skin breakdown  2.  Assess vascular access sites hourly  3.  Every 4-6 hours minimum:  Change oxygen saturation probe site  4.  Every 4-6 hours:  If on nasal continuous positive airway pressure, respiratory therapy assess nares and determine need for appliance change or resting period.  Outcome: Progressing     Problem: Pain  Goal: Verbalizes/displays adequate comfort level or baseline comfort level  Outcome: Progressing     Problem: Occupational Therapy - Adult  Goal: By Discharge: Performs self-care activities at highest level of function for planned discharge setting.  See evaluation for individualized goals.  Description: FUNCTIONAL STATUS PRIOR TO ADMISSION:  Prior to 3-4 weeks ago, she was IND with SPC use, driving, grocery shopping. She reports decreased acuity & blurry vision in L eye prior to acute weakness, hx fo cataracts in family but has not been to the optometrist to assess.     HOME SUPPORT: Patient lived with her  but didn't require assistance.     Occupational Therapy Goals  Initiated 1/10/2024    1.  Patient will perform upper & lower body dressing with Moderate Assist using within 7 days.  2.  Patient will perform grooming at the sink with Moderate Assist within 7 days.   3.  Patient will standing ADLs 5 mins at Moderate Assist within 7 days.   4.  Patient will don/doff neck brace at Moderate Assist within 7 days.  5.  Patient will verbalize/demonstrate 3/3 cervical precautions during ADL tasks without cues within 7 days.    6.  Patient will perform toilet transfers to/from [facility] with Moderate Assist  within 7 day(s).  7.  Patient will perform all aspects of toileting with Moderate Assist within 7 day(s).  8.  Patient will participate in upper extremity therapeutic exercise/activities with 
2000 Received patient from GURJIT Hare.    9144 Bedside shift change report given to GURJIT Hare (oncoming nurse) by Consuelo Stewart RN. Report included the following information SBAR, Kardex, MAR, Recent Results, and Cardiac Rhythm SB.     Problem: Chronic Conditions and Co-morbidities  Goal: Patient's chronic conditions and co-morbidity symptoms are monitored and maintained or improved  1/11/2024 0005 by Consuelo Stewart RN  Outcome: Progressing  1/10/2024 1033 by Ananya Morgan RN  Outcome: Progressing  Flowsheets (Taken 1/10/2024 0800)  Care Plan - Patient's Chronic Conditions and Co-Morbidity Symptoms are Monitored and Maintained or Improved:   Monitor and assess patient's chronic conditions and comorbid symptoms for stability, deterioration, or improvement   Collaborate with multidisciplinary team to address chronic and comorbid conditions and prevent exacerbation or deterioration   Update acute care plan with appropriate goals if chronic or comorbid symptoms are exacerbated and prevent overall improvement and discharge     Problem: Safety - Adult  Goal: Free from fall injury  1/11/2024 0005 by Consuelo Stewart RN  Outcome: Progressing  1/10/2024 1033 by Ananya Morgan RN  Outcome: Progressing  Flowsheets (Taken 1/10/2024 0800)  Free From Fall Injury: Instruct family/caregiver on patient safety     Problem: Discharge Planning  Goal: Discharge to home or other facility with appropriate resources  1/11/2024 0005 by Consuelo Stewart RN  Outcome: Progressing  1/10/2024 1033 by Ananya Morgan RN  Outcome: Progressing  Flowsheets (Taken 1/10/2024 0800)  Discharge to home or other facility with appropriate resources:   Identify barriers to discharge with patient and caregiver   Arrange for needed discharge resources and transportation as appropriate   Identify discharge learning needs (meds, wound care, etc)     Problem: Skin/Tissue Integrity  Goal: Absence of new skin 
2000 Received patient from GURJIT Ibarra.    8501 Bedside shift change report given to GURJIT Hare (oncoming nurse) by Consuelo Stewart RN. Report included the following information SBAR, Kardex, MAR, Recent Results, and Cardiac Rhythm SB.     Problem: Chronic Conditions and Co-morbidities  Goal: Patient's chronic conditions and co-morbidity symptoms are monitored and maintained or improved  1/9/2024 2309 by Consuelo Stewart RN  Outcome: Progressing  1/9/2024 2059 by Stacey Carey RN  Outcome: Progressing  Flowsheets (Taken 1/9/2024 0800)  Care Plan - Patient's Chronic Conditions and Co-Morbidity Symptoms are Monitored and Maintained or Improved:   Monitor and assess patient's chronic conditions and comorbid symptoms for stability, deterioration, or improvement   Collaborate with multidisciplinary team to address chronic and comorbid conditions and prevent exacerbation or deterioration   Update acute care plan with appropriate goals if chronic or comorbid symptoms are exacerbated and prevent overall improvement and discharge     Problem: Safety - Adult  Goal: Free from fall injury  1/9/2024 2309 by Consuelo Stewart RN  Outcome: Progressing  1/9/2024 2059 by Stacey Carey RN  Outcome: Progressing     Problem: Discharge Planning  Goal: Discharge to home or other facility with appropriate resources  1/9/2024 2309 by Consuelo Stewart RN  Outcome: Progressing  1/9/2024 2059 by Stacey Carey RN  Outcome: Progressing  Flowsheets (Taken 1/9/2024 0800)  Discharge to home or other facility with appropriate resources: Identify barriers to discharge with patient and caregiver     Problem: Skin/Tissue Integrity  Goal: Absence of new skin breakdown  Description: 1.  Monitor for areas of redness and/or skin breakdown  2.  Assess vascular access sites hourly  3.  Every 4-6 hours minimum:  Change oxygen saturation probe site  4.  Every 4-6 hours:  If on nasal continuous positive airway pressure, respiratory therapy assess 
SBAR obtained from prior shift nurse. Patient is assessed via flowsheets. Patient in room conversing with significant other. Call bell in reach, bed in lowest position and side rails X2. Plan of care and safety protocol is being followed MD orders.  
SBAR received from prior shift nurse. Patient is awake, alert and oriented laying in supine position. No acute distress is noted. Patient complaints verbalized at this time. Patient repositioned for comfort utilizing pillows. Call light in reach, bed in lowest position, side rails X2 to aide with repositioning. Plan of care and safety protocol is being followed per MD orders.     Problem: Chronic Conditions and Co-morbidities  Goal: Patient's chronic conditions and co-morbidity symptoms are monitored and maintained or improved  1/13/2024 2355 by Viral Gallegos RN  Outcome: Progressing  1/13/2024 1607 by Concha Gallegos RN  Outcome: Progressing  1/13/2024 1514 by Concha Gallegos RN  Outcome: Progressing     Problem: Safety - Adult  Goal: Free from fall injury  1/13/2024 2355 by Viral Gallegos RN  Outcome: Progressing  Flowsheets (Taken 1/13/2024 2000)  Free From Fall Injury: Based on caregiver fall risk screen, instruct family/caregiver to ask for assistance with transferring infant if caregiver noted to have fall risk factors  1/13/2024 1607 by Concha Gallegos RN  Outcome: Progressing  1/13/2024 1514 by Concha Gallegos RN  Outcome: Progressing     Problem: Discharge Planning  Goal: Discharge to home or other facility with appropriate resources  1/13/2024 2355 by Viral Gallegos RN  Outcome: Progressing  1/13/2024 1607 by Concha Gallegos RN  Outcome: Progressing  1/13/2024 1514 by Concha Gallegos RN  Outcome: Progressing     Problem: Skin/Tissue Integrity  Goal: Absence of new skin breakdown  Description: 1.  Monitor for areas of redness and/or skin breakdown  2.  Assess vascular access sites hourly  3.  Every 4-6 hours minimum:  Change oxygen saturation probe site  4.  Every 4-6 hours:  If on nasal continuous positive airway pressure, respiratory therapy assess nares and determine need for appliance change or resting period.  1/13/2024 2355 by Viral Gallegos 
for max assist, as she has so little control of her lower body in terms of strength.    She has a good support system, excellent motivation and significant deficits that would greatly benefit from intensive multidisciplinary rehab as soon as she is medically ready.         PLAN:  Patient continues to benefit from skilled intervention to address the above impairments.  Continue treatment per established plan of care.    Recommendation for discharge: (in order for the patient to meet his/her long term goals): Therapy 3 hours/day 5-7 days/week    Other factors to consider for discharge: patient's current support system is unable to meet their requirements for physical assistance, high risk for falls, concern for safely navigating or managing the home environment, and new weight bearing restrictions limiting activity or patient is unable to maintain    IF patient discharges home will need the following DME: continuing to assess with progress       SUBJECTIVE:   Patient stated, \"The pain is pretty minimal.\"    OBJECTIVE DATA SUMMARY:   Critical Behavior:          Functional Mobility Training:  Bed Mobility:  Bed Mobility Training  Supine to Sit: Minimum assistance (with HOB elevated)  Transfers:  Transfer Training  Sit to Stand: Moderate assistance;Additional time;Adaptive equipment  Stand to Sit: Moderate assistance;Additional time;Adaptive equipment  Bed to Chair: Moderate assistance;Additional time;Adaptive equipment  Balance:  Balance  Sitting: Impaired  Sitting - Static: Good (unsupported)  Sitting - Dynamic: Fair (occasional)  Standing: Impaired  Standing - Static: Constant support;Fair  Standing - Dynamic: Constant support;Poor   Ambulation/Gait Training:     Gait  Overall Level of Assistance: Moderate assistance;Adaptive equipment;Additional time  Distance (ft): 3 Feet  Assistive Device: Gait belt;Walker, rolling  Base of Support: Narrowed  Speed/Shauna: Shuffled;Slow  Step Length: Right shortened;Left 
bell within reach, and Bed/ chair alarm activated      COMMUNICATION/EDUCATION:   The patient's plan of care was discussed with: registered nurse    Patient Education  Education Given To: Patient  Education Provided: Role of Therapy;Plan of Care  Education Method: Verbal;Demonstration  Barriers to Learning: None  Education Outcome: Verbalized understanding      Zandra Fox, PT, DPT  Minutes: 24    
with staff, be out of bed for all meals, perform daily ADLs (as approved by RN/MD regarding bathing etc), and performing functional mobility to/from bathroom. Patient instruction and indicated understanding on body mechanics, ergonomics and gravitational force on the spine during different body positions to plan activities in prep for return home to complete basic ADLs, instrumental ADLs and back to work safely.     Toileting: Patient instructed on the benefits of using flushable wet wipes and toilet tongs if decreased reach or pain for sofy care. Also, the benefits of a reacher to aid in clothing management.     Home safety: Patient instructed and indicated understanding on home modifications and safety [raise height of ADL objects (i.e. clothing, sink items, fridge items, items to mouth when grooming), change of floor surfaces, clear pathways] to increase independence and fall prevention.      Standing: Patient instructed and indicated understanding to walk up to sink/counter top/surfaces, step into walker, square off while using objects, slide objects along surfaces, to increase adherence to back precautions and fall prevention.      Pain Rating:  Tolerates session well.      Pain Intervention(s):   rest and repositioning    Activity Tolerance:   Fair  and requires rest breaks  Please refer to the flowsheet for vital signs taken during this treatment.    After treatment:   Patient left in no apparent distress sitting up in chair, Call bell within reach, and Bed/ chair alarm activated    COMMUNICATION/EDUCATION:   The patient's plan of care was discussed with: physical therapist and registered nurse    Patient Education  Education Given To: Patient  Education Provided: Role of Therapy;ADL Adaptive Strategies;Energy Conservation;Fall Prevention Strategies;Plan of Care;Precautions;Transfer Training  Education Method: Verbal;Demonstration  Barriers to Learning: Cognition  Education Outcome: Verbalized 
dynamic sitting balance and distal BLE access    UE Dressing: Moderate assistance  UE Dressing Skilled Clinical Factors: donning clean down, increased A for LUE FM coordination and proximal donning L>R side    LE Dressing: Maximum assistance  LE Dressing Skilled Clinical Factors: able to assist with distal donning in supported sitting, slight A for feet into slots however heavy assist for proximal donning    Toileting: Dependent/Total  Toileting Skilled Clinical Factors: attempted to assist with bowel hygiene however poor balance limiting safety for continued attempts, Total A wiht standing from [facility] with assist x2 for transfer with RW    ADL Intervention and task modifications:  Patient recalled 0/3 (unaware prior to therapy session) and demonstrated 2-3/3 cervical spine precautions with min-mod cues with activity.     Patient instructed and indicated understanding the benefits of maintaining activity tolerance, functional mobility, and independence with self care tasks during acute stay  to ensure safe return home and to baseline. Encouraged patient to increase frequency and duration OOB, not sitting longer than 30 mins without marching/walking with staff, be out of bed for all meals, perform daily ADLs (as approved by RN/MD regarding bathing etc), and performing functional mobility to/from bathroom. Patient instruction and indicated understanding on body mechanics, ergonomics and gravitational force on the spine during different body positions to plan activities in prep for return home to complete basic ADLs, instrumental ADLs and back to work safely.       Dressing lower body: On the benefits to remain seated to don all clothing to increase independence with precautions and pain management. Patient instructed and was able to demonstrate tailor sitting for lower body dressing with Max A.    Toileting: Patient instructed on the benefits of using flushable wet wipes and toilet tongs if decreased reach or pain for

## 2024-01-18 NOTE — CARE COORDINATION
Transition of Care Plan:     RUR: 14% Low Risk  Prior Level of Functioning: Independent  Disposition: Inpatient Acute Rehab; [facility]  If SNF or IPR: Date FOC offered: 12/31/2023  Date FOC received: 12/31/2023  Accepting facility: [facility]  Date authorization started with reference number: 12/31/2023  Date authorization received and expires:   Follow up appointments:   DME needed: None  Transportation at discharge: Stretcher  IM/IMM Medicare/ letter given: 1/6/2024  Is patient a  and connected with [facility]? No              If yes, was  transfer form completed and [facility] notified?   Caregiver Contact: Rickie Rodriguez  [phone]  Discharge Caregiver contacted prior to discharge? Yes  Care Conference needed? No  Barriers to discharge: None        Piedad called.  She has authorization for Mrs. Fontenot.  She would like her about 2:00 pm.    Mrs. Fontenot was informed.  She requested a stretcher transport.  She was informed if she receives a bill for the stretcher transport, she will need to appeal the denial to her insurance.      [facility] was called.  A 1:30 pm discharge was arranged.    Mrs. Fontenot was informed.    Nursing was informed.    The discharge packet was initiated and place on the hard chart.    Will continue to follow for discharge planning.  SHERLYN DOWELL LCSW

## 2024-03-27 ENCOUNTER — HOSPITAL ENCOUNTER (EMERGENCY)
Facility: HOSPITAL | Age: 69
Discharge: HOME | End: 2024-03-27
Attending: EMERGENCY MEDICINE
Payer: MEDICARE

## 2024-03-27 ENCOUNTER — APPOINTMENT (OUTPATIENT)
Facility: HOSPITAL | Age: 69
End: 2024-03-27
Payer: MEDICARE

## 2024-03-27 VITALS
RESPIRATION RATE: 16 BRPM | OXYGEN SATURATION: 97 % | SYSTOLIC BLOOD PRESSURE: 196 MMHG | TEMPERATURE: 98.9 F | HEIGHT: 64 IN | WEIGHT: 172 LBS | HEART RATE: 80 BPM | BODY MASS INDEX: 29.37 KG/M2 | DIASTOLIC BLOOD PRESSURE: 65 MMHG

## 2024-03-27 DIAGNOSIS — S00.01XA ABRASION OF SCALP, INITIAL ENCOUNTER: Primary | ICD-10-CM
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
## 2024-03-27 PROCEDURE — 99284 EMERGENCY DEPT VISIT MOD MDM: CPT

## 2024-03-27 PROCEDURE — 70450 CT HEAD/BRAIN W/O DYE: CPT

## 2024-03-27 ASSESSMENT — PAIN - FUNCTIONAL ASSESSMENT: PAIN_FUNCTIONAL_ASSESSMENT: NONE - DENIES PAIN

## 2024-03-27 NOTE — ED PROVIDER NOTES
Onset    Cancer Sister 7        Brain Cancer    Other Brother 57        [facility]    Hypertension Father     Cancer Father         cancer    Dementia Mother     Heart Disease Father        Social History:  Social History     Tobacco Use    Smoking status: Former     Current packs/day: 0.00     Types: Cigarettes     Quit date: 2000     Years since quittin.1    Smokeless tobacco: Never   Substance Use Topics    Alcohol use: No    Drug use: No       Allergies:  Allergies   Allergen Reactions    Shellfish-Derived Products Anaphylaxis       PCP: Kavon Thurman MD    Current Meds:   No current facility-administered medications for this encounter.     Current Outpatient Medications   Medication Sig Dispense Refill    atorvastatin (LIPITOR) 40 MG tablet Take 1 tablet by mouth nightly 30 tablet 0    metoprolol succinate (TOPROL XL) 25 MG extended release tablet Take 1 tablet by mouth daily 30 tablet 0    ranolazine (RANEXA) 500 MG extended release tablet Take 1 tablet by mouth 2 times daily      hydrOXYzine pamoate (VISTARIL) 25 MG capsule Take 1 capsule by mouth 3 times daily as needed for Itching      acetaminophen (TYLENOL) 500 MG tablet Take by mouth every 6 hours as needed      Biotin 5 MG TBDP Take 1 tablet by mouth       Social Determinants of Health:   Social Determinants of Health     Tobacco Use: Medium Risk (1/10/2024)    Patient History     Smoking Tobacco Use: Former     Smokeless Tobacco Use: Never     Passive Exposure: Not on file   Alcohol Use: Not At Risk (2023)    AUDIT-C     Frequency of Alcohol Consumption: Never     Average Number of Drinks: Patient does not drink     Frequency of Binge Drinking: Never   Financial Resource Strain: Not on file   Food Insecurity: No Food Insecurity (2023)    Hunger Vital Sign     Worried About Running Out of Food in the Last Year: Never true     Ran Out of Food in the Last Year: Never true   Transportation Needs: No Transportation Needs

## 2024-03-27 NOTE — ED TRIAGE NOTES
Presents to ER with c/o GLF. Pt states she is receiving physical therapy for cervical surgery received in January and ambulates with a walker. States she was attempting to move a glass table in her home with while using her walker when she fell. Denies LOC. C/O \"soreness\" to right hip. Abrasions noted to back of head.

## 2024-05-16 ENCOUNTER — HOSPITAL ENCOUNTER (OUTPATIENT)
Facility: HOSPITAL | Age: 69
Setting detail: RECURRING SERIES
Discharge: HOME | End: 2024-05-19
Payer: MEDICARE

## 2024-05-16 PROCEDURE — 97161 PT EVAL LOW COMPLEX 20 MIN: CPT

## 2024-05-16 NOTE — THERAPY EVALUATION
[facility]  [address], [address]  [place] [address]  Phone: [phone]    Fax: [phone]         PHYSICAL THERAPY - MEDICARE EVALUATION/PLAN OF CARE NOTE (updated 3/23)      Date: 2024          Patient Name:  Yusra Fontenot :  1955   Medical   Diagnosis:  Other abnormalities of gait and mobility [R26.89] Treatment Diagnosis:  R26.89   Abnormalities of gait and mobility    Referral Source:  Kavon Thurman MD Provider #:  [id number]                Insurance: Payor: [facility] MEDICARE / Plan: [facility] [employer] MEDIBLUE PLUS / Product Type: *No Product type* /      Patient  verified yes     Visit #   Current  / Total 1 24   Time   In / Out 230 330   Total Treatment Time 60   Total Timed Codes 0   1:1 Treatment Time 60      [facility] Totals Reminder:  bill using total billable   min of TIMED therapeutic procedures and modalities.   8-22 min = 1 unit; 23-37 min = 2 units; 38-52 min = 3 units;  53-67 min = 4 units; 68-82 min = 5 units           SUBJECTIVE  Pain Level (0-10 scale): 5  []constant []intermittent []improving []worsening []no change since onset    Any medication changes, allergies to medications, adverse drug reactions, diagnosis change, or new procedure performed?: [x] No    [] Yes (see summary sheet for update)  Medications: Verified on Patient Summary List    Subjective functional status/changes:     Stopped driving around Thanksgiving; lost my confidence.  Had a bad fall in bathroom in December. Came to PT here.  Referred back to doc. Further testing showed I needed neck fusion  Or else I would be a paraplegic.  Had neck fusion 2024.  Had [facility] rehab for 20 days.  Then went home and had home health PT/OT until 2024.  Now I am here for outpatient PT/OT.  [facility] gave me a left knee brace to stop the [recurvatum] which I use  When I am doing my 3x/day 7 min. Walk around the house.  I don't really get

## 2024-05-22 ENCOUNTER — HOSPITAL ENCOUNTER (OUTPATIENT)
Facility: HOSPITAL | Age: 69
Setting detail: RECURRING SERIES
Discharge: HOME | End: 2024-05-25
Payer: MEDICARE

## 2024-05-22 PROCEDURE — 97110 THERAPEUTIC EXERCISES: CPT

## 2024-05-22 NOTE — PROGRESS NOTES
PHYSICAL THERAPY - MEDICARE DAILY TREATMENT NOTE (updated 3/23)      Date: 2024          Patient Name:  Yusra Fontenot :  1955   Medical   Diagnosis:  Other abnormalities of gait and mobility [R26.89] Treatment Diagnosis:  R26.89   Abnormalities of gait and mobility    Referral Source:  Kavon Thurman MD Insurance:   Payor: [facility] MEDICARE / Plan: [facility] [employer]ALTH[employer]EEPERS MEDIBLUE PLUS / Product Type: *No Product type* /                     Patient  verified yes     Visit #   Current  / Total 2 5   Time   In / Out 245 315   Total Treatment Time 45   Total Timed Codes 40   1:1 Treatment Time 40      [facility] Totals Reminder:  bill using total billable   min of TIMED therapeutic procedures and modalities.   8-22 min = 1 unit; 23-37 min = 2 units; 38-52 min = 3 units; 53-67 min = 4 units; 68-82 min = 5 units            SUBJECTIVE    Pain Level (0-10 scale): 5    Any medication changes, allergies to medications, adverse drug reactions, diagnosis change, or new procedure performed?: [x] No    [] Yes (see summary sheet for update)  Medications: Verified on Patient Summary List    Subjective functional status/changes:     Hands bother me. Tingling.    OBJECTIVE      Therapeutic Procedures:  Tx Min Billable or 1:1 Min (if diff from Tx Min) Procedure, Rationale, Specifics   40  92241 Therapeutic Exercise (timed):  increase ROM, strength, coordination, balance, and proprioception to improve patient's ability to progress to PLOF and address remaining functional goals. (see flow sheet as applicable)     Details if applicable:            Details if applicable:           Details if applicable:           Details if applicable:            Details if applicable:     40     Total Total         [x]  Patient Education billed concurrently with other procedures   [x] Review HEP    [] Progressed/Changed HEP, detail:    [] Other detail:         Other Objective/Functional Measures  Gait with wheeled walker.    Pain Level

## 2024-05-29 ENCOUNTER — HOSPITAL ENCOUNTER (OUTPATIENT)
Facility: HOSPITAL | Age: 69
Setting detail: RECURRING SERIES
Discharge: HOME | End: 2024-06-01
Payer: MEDICARE

## 2024-05-29 PROCEDURE — 97110 THERAPEUTIC EXERCISES: CPT

## 2024-05-29 NOTE — PROGRESS NOTES
PHYSICAL THERAPY - MEDICARE DAILY TREATMENT NOTE (updated 3/23)      Date: 2024          Patient Name:  Yusra Fontenot :  1955   Medical   Diagnosis:  Other abnormalities of gait and mobility [R26.89] Treatment Diagnosis:  R26.89   Abnormalities of gait and mobility    Referral Source:  Kavon Thurman MD Insurance:   Payor: [facility] MEDICARE / Plan: [facility] [employer]ALTHKEEPERS MEDIBLUE PLUS / Product Type: *No Product type* /                     Patient  verified yes     Visit #   Current  / Total 3 5   Time   In / Out 100 145   Total Treatment Time 45   Total Timed Codes 40   1:1 Treatment Time 40      [facility] Totals Reminder:  bill using total billable   min of TIMED therapeutic procedures and modalities.   8-22 min = 1 unit; 23-37 min = 2 units; 38-52 min = 3 units; 53-67 min = 4 units; 68-82 min = 5 units            SUBJECTIVE    Pain Level (0-10 scale): 0    Any medication changes, allergies to medications, adverse drug reactions, diagnosis change, or new procedure performed?: [x] No    [] Yes (see summary sheet for update)  Medications: Verified on Patient Summary List    Subjective functional status/changes:      bought me a stationary bike.  Tried it for 10 minutes. Went okay.    OBJECTIVE      Therapeutic Procedures:  Tx Min Billable or 1:1 Min (if diff from Tx Min) Procedure, Rationale, Specifics   40  22240 Therapeutic Exercise (timed):  increase ROM, strength, coordination, balance, and proprioception to improve patient's ability to progress to PLOF and address remaining functional goals. (see flow sheet as applicable)     Details if applicable:            Details if applicable:           Details if applicable:           Details if applicable:            Details if applicable:     40     Total Total         [x]  Patient Education billed concurrently with other procedures   [x] Review HEP    [] Progressed/Changed HEP, detail:    [] Other detail:         Other Objective/Functional

## 2024-09-24 ENCOUNTER — HOSPITAL ENCOUNTER (OUTPATIENT)
Facility: HOSPITAL | Age: 69
Discharge: HOME | End: 2024-09-27
Payer: MEDICARE

## 2024-09-24 DIAGNOSIS — G95.9 CERVICAL MYELOPATHY (HCC): ICD-10-CM

## 2024-09-24 PROCEDURE — 72125 CT NECK SPINE W/O DYE: CPT

## 2024-09-24 PROCEDURE — 72141 MRI NECK SPINE W/O DYE: CPT

## 2025-02-05 ENCOUNTER — OFFICE VISIT (OUTPATIENT)
Age: 70
End: 2025-02-05
Payer: MEDICARE

## 2025-02-05 VITALS — HEIGHT: 64 IN | BODY MASS INDEX: 29.51 KG/M2

## 2025-02-05 DIAGNOSIS — M25.551 RIGHT HIP PAIN: Primary | ICD-10-CM

## 2025-02-05 DIAGNOSIS — Z96.641 AFTERCARE FOLLOWING RIGHT HIP JOINT REPLACEMENT SURGERY: ICD-10-CM

## 2025-02-05 DIAGNOSIS — Z47.1 AFTERCARE FOLLOWING RIGHT HIP JOINT REPLACEMENT SURGERY: ICD-10-CM

## 2025-02-05 PROCEDURE — 1123F ACP DISCUSS/DSCN MKR DOCD: CPT | Performed by: ORTHOPAEDIC SURGERY

## 2025-02-05 PROCEDURE — 1159F MED LIST DOCD IN RCRD: CPT | Performed by: ORTHOPAEDIC SURGERY

## 2025-02-05 PROCEDURE — 99213 OFFICE O/P EST LOW 20 MIN: CPT | Performed by: ORTHOPAEDIC SURGERY

## 2025-02-05 PROCEDURE — 1125F AMNT PAIN NOTED PAIN PRSNT: CPT | Performed by: ORTHOPAEDIC SURGERY

## 2025-02-05 RX ORDER — ASPIRIN 81 MG/1
TABLET, CHEWABLE ORAL
COMMUNITY
Start: 2024-01-02

## 2025-02-05 RX ORDER — GABAPENTIN 300 MG/1
300 CAPSULE ORAL 3 TIMES DAILY
COMMUNITY
Start: 2025-01-15

## 2025-02-05 ASSESSMENT — PATIENT HEALTH QUESTIONNAIRE - PHQ9
2. FEELING DOWN, DEPRESSED OR HOPELESS: NOT AT ALL
SUM OF ALL RESPONSES TO PHQ QUESTIONS 1-9: 0
SUM OF ALL RESPONSES TO PHQ9 QUESTIONS 1 & 2: 0
SUM OF ALL RESPONSES TO PHQ QUESTIONS 1-9: 0
1. LITTLE INTEREST OR PLEASURE IN DOING THINGS: NOT AT ALL

## 2025-02-05 NOTE — PROGRESS NOTES
Identified pt with two pt identifiers (name and ). Reviewed chart in preparation for visit and have obtained necessary documentation.    Yusra Fontenot is a 69 y.o. female Follow-up (Right Hip )  .    Vitals:    25 1108   Height: 1.626 m (5' 4.02\")          1. Have you been to the ER, urgent care clinic since your last visit?  Hospitalized since your last visit?  no     2. Have you seen or consulted any other health care providers outside of the [facility] since your last visit?  Include any pap smears or colon screening.  no  
chest pain, breathing problems, wheezing, pneumonia  Stomach: Denies stomach pain, heartburn, constipation, irritable bowel  Skin: Denies rash, sores, open wounds  Musculoskeletal: no issues with hip  Genitourinary: Denies dysuria, hematuria, polyuria  Gastrointestinal: Denies constipation, obstipation, diarrhea  Neurological: Denies changes in sight, smell, hearing, taste, seizures. Denies loss of consciousness.  Psychiatric: Denies depression, sleep pattern changes, anxiety, change in personality  Endocrine: Denies mood swings, heat or cold intolerance  Hematologic/Lymphatic: Denies anemia, purpura, petechia  Allergic/Immunologic: Denies swelling of throat, pain or swelling at lymph nodes      Physical Examination:    There were no vitals filed for this visit.     General: AOX3, no apparent distress  Psychiatric: mood and affect appropriate  Lungs: breathing is symmetric and unlabored bilaterally  Heart: regular rate and rhythm  Abdomen: no guarding  Head: normocephalic, atraumatic  Skin: No significant abnormalities, good turgor  Sensation intact to light touch: L1-S1 dermatomes  Muscular exam: 5/5 strength in all major muscle groups unless noted in specialty exam.    Extremities:      Left upper extremity: Full active and passive range of motion without pain, deformity, no open wound, strength 5/5 in all major muscle groups.    Right upper extremity: Full active and passive range of motion without pain, deformity, no open wound, strength 5/5 in all major muscle groups.    left lower extremity: Full active and passive range of motion without pain, deformity, no open wound, strength 5/5 in all major muscle groups.    right lower extremity:  Well healed surgical wound is noted.  Patient is seen in a wheelchair.  No deformity is noted.   Circumduction of the hip does not cause arthritic pain as it had preoperatively.  Strength testing is indicative of 3/5 strength at hip flexion, extension,  5/5 knee flexion and

## 2025-02-07 ENCOUNTER — TELEPHONE (OUTPATIENT)
Age: 70
End: 2025-02-07

## 2025-02-07 NOTE — TELEPHONE ENCOUNTER
----- Message from Dr. Emmanuel Gracia, DO sent at 2/5/2025  1:42 PM EST -----  Please contact Dr. Fallon's (neurosurgery ) office, let him know that her hip looks fine and I would recommend further PT

## 2025-05-28 ENCOUNTER — TRANSCRIBE ORDERS (OUTPATIENT)
Facility: HOSPITAL | Age: 70
End: 2025-05-28

## 2025-05-28 DIAGNOSIS — M54.17 RADICULOPATHY, LUMBOSACRAL REGION: Primary | ICD-10-CM

## 2025-06-04 ENCOUNTER — HOSPITAL ENCOUNTER (OUTPATIENT)
Facility: HOSPITAL | Age: 70
Discharge: HOME | End: 2025-06-07
Payer: MEDICARE

## 2025-06-04 DIAGNOSIS — M54.17 RADICULOPATHY, LUMBOSACRAL REGION: ICD-10-CM

## 2025-06-04 PROCEDURE — 72148 MRI LUMBAR SPINE W/O DYE: CPT

## (undated) DEVICE — HEARTRAIL III GUIDING CATHETER: Brand: HEARTRAIL

## (undated) DEVICE — GLIDESHEATH SLENDER STAINLESS STEEL KIT: Brand: GLIDESHEATH SLENDER

## (undated) DEVICE — SUTURE VCRL SZ 3-0 L18IN ABSRB UD CP-2 L26MM 1/2 CIR REV J761D

## (undated) DEVICE — SUTURE MCRYL SZ 4-0 L27IN ABSRB UD L19MM PS-2 1/2 CIR PRIM Y426H

## (undated) DEVICE — ANTERIOR CERVICAL-SMH: Brand: MEDLINE INDUSTRIES, INC.

## (undated) DEVICE — 3M™ STERI-DRAPE™ SMALL DRAPE WITH ADHESIVE APERTURE 1092 25/BX,4/CS&#X20;: Brand: STERI-DRAPE™

## (undated) DEVICE — SOLUTION IRRIG 1000ML STRL H2O USP PLAS POUR BTL

## (undated) DEVICE — SHEET, DRAPE, SPLIT, STERILE: Brand: MEDLINE

## (undated) DEVICE — TUBING ANGIO L30IN ID18MM 1200PSI POLYUR FLX BRAID AIRLESS

## (undated) DEVICE — XTW CERVICAL COLLAR: Brand: DEROYAL

## (undated) DEVICE — INTENT OT USE PROVIDES A STERILE INTERFACE BETWEEN THE OPERATING ROOM SURGICAL LAMPS (NON-STERILE) AND THE SURGEON OR STAFF WORKING IN THE STERILE FIELD.: Brand: ASPEN® ALC PLUS LIGHT HANDLE COVER

## (undated) DEVICE — SYRINGE MED 10ML PUR GAM COMPATIBLE POLYCARB FIX M LUER CONN

## (undated) DEVICE — SC 3W MP RA OFF PB - PG: Brand: NAMIC

## (undated) DEVICE — 3M™ IOBAN™ 2 ANTIMICROBIAL INCISE DRAPE 6651EZ: Brand: IOBAN™ 2

## (undated) DEVICE — PRESSURE TUBING: Brand: TRUWAVE

## (undated) DEVICE — CORD ELECSURG BPLR 12 FT DISP 810T818750] ADLER INSTRUMENT CO]

## (undated) DEVICE — 48" PROBE COVER W/GEL, ULTRASOUND, STERILE: Brand: SITE-RITE

## (undated) DEVICE — SOLUTION IRRIG 1000ML 0.9% SOD CHL USP POUR PLAS BTL

## (undated) DEVICE — DRAPE,REIN 53X77,STERILE: Brand: MEDLINE

## (undated) DEVICE — SNAP KOVER: Brand: UNBRANDED

## (undated) DEVICE — BONE WAX WHITE: Brand: BONE WAX WHITE

## (undated) DEVICE — MASTISOL ADHESIVE LIQ 2/3ML

## (undated) DEVICE — SCREW EXT FIX L14MM FOR DISTRCTN

## (undated) DEVICE — COPILOT BLEEDBACK CONTROL VALVE: Brand: COPILOT

## (undated) DEVICE — NEEDLE SPNL 20GA L3.5IN YEL HUB S STL REG WALL FIT STYL W/

## (undated) DEVICE — RADIFOCUS OPTITORQUE ANGIOGRAPHIC CATHETER: Brand: OPTITORQUE

## (undated) DEVICE — STRIP,CLOSURE,WOUND,MEDI-STRIP,1/2X4: Brand: MEDLINE

## (undated) DEVICE — TOWEL,OR,DSP,ST,BLUE,DLX,6/PK,12PK/CS: Brand: MEDLINE

## (undated) DEVICE — Device: Brand: JELCO

## (undated) DEVICE — GUIDEWIRE VASC L260CM DIA0.035IN TIP L3MM STD EXCHG PTFE J

## (undated) DEVICE — TOWEL SURG W17XL27IN STD BLU COT NONFENESTRATED PREWASHED

## (undated) DEVICE — GLOVE SURG SZ 85 L12IN FNGR ORTHO 126MIL CRM LTX FREE

## (undated) DEVICE — C-ARM: Brand: UNBRANDED

## (undated) DEVICE — GOWN,SIRUS,NONRNF,XLN/2XL,18/CS: Brand: MEDLINE

## (undated) DEVICE — Device: Brand: EAGLE EYE PLATINUM ST RX DIGITAL IVUS CATHETER

## (undated) DEVICE — YANKAUER,SMOOTH HANDLE,HIGH CAPACITY: Brand: MEDLINE INDUSTRIES, INC.

## (undated) DEVICE — WASTEBAG DRIP/ADAPTER: Brand: MEDLINE INDUSTRIES, INC.

## (undated) DEVICE — SUTURE VCRL SZ 1 L36IN ABSRB UD L36MM CT-1 1/2 CIR J947H

## (undated) DEVICE — DRAPE,U/ SHT,SPLIT,PLAS,STERIL: Brand: MEDLINE

## (undated) DEVICE — 3M™ TEGADERM™ TRANSPARENT FILM DRESSING FRAME STYLE, 1626W, 4 IN X 4-3/4 IN (10 CM X 12 CM), 50/CT 4CT/CASE: Brand: 3M™ TEGADERM™

## (undated) DEVICE — BLADE ES ELASTOMERIC COAT INSUL DURABLE BEND UPTO 90DEG

## (undated) DEVICE — BLADE ELECTRODE: Brand: EDGE

## (undated) DEVICE — TRANSFER SET 3": Brand: MEDLINE INDUSTRIES, INC.

## (undated) DEVICE — SUTURE VCRL SZ 2-0 L36IN ABSRB UD L36MM CT-1 1/2 CIR J945H

## (undated) DEVICE — GLOVE SURG SZ 85 L12IN FNGR THK79MIL GRN LTX FREE

## (undated) DEVICE — FLOSEAL WITH RECOTHROM - 10ML.: Brand: FLOSEAL HEMOSTATIC MATRIX

## (undated) DEVICE — SYRINGE MED 10ML RED POLYCARB BRL FIX M LUER CONN FLAT GRP

## (undated) DEVICE — KIT POS FOAM HANA TBL

## (undated) DEVICE — Device: Brand: OMNIWIRE PRESSURE GUIDE WIRE

## (undated) DEVICE — DRAPE,LAPAROTOMY,T,PEDI,STERILE: Brand: MEDLINE

## (undated) DEVICE — TUBING, SUCTION, 1/4" X 10', STRAIGHT: Brand: MEDLINE

## (undated) DEVICE — GAUZE,SPONGE,4"X4",16PLY,STRL,LF,10/TRAY: Brand: MEDLINE

## (undated) DEVICE — DERMABOND SKIN ADH 0.7ML -- DERMABOND ADVANCED 12/BX

## (undated) DEVICE — DRESSING BORDERED ADH GZ UNIV GEN USE 8INX4IN AND 6INX2IN

## (undated) DEVICE — SUTURE MCRYL SZ 3-0 L27IN ABSRB UD L24MM PS-1 3/8 CIR PRIM Y936H

## (undated) DEVICE — BRUSH SCRB 4% CHG RED DISP --

## (undated) DEVICE — CATHETER GUID 6FR L100CM DIA0.071IN NYL SHFT EBU3.5

## (undated) DEVICE — SOLUTION IV 250ML 0.9% SOD CHL CLR INJ FLX BG CONT PRT CLSR

## (undated) DEVICE — SOLUTION SURG PREP 26 CC PURPREP

## (undated) DEVICE — SURGICAL PROCEDURE TRAY CRD CATH 3 PRT

## (undated) DEVICE — STRYKER PERFORMANCE SERIES SAGITTAL BLADE: Brand: STRYKER PERFORMANCE SERIES

## (undated) DEVICE — VALVE HEMSTAS W/ GWIRE INSRTN TOOL GRDIAN II NC

## (undated) DEVICE — TOTAL JOINT-MRMC: Brand: MEDLINE INDUSTRIES, INC.

## (undated) DEVICE — HOOD: Brand: FLYTE

## (undated) DEVICE — 450 ML BOTTLE OF 0.05% CHLORHEXIDINE GLUCONATE IN 99.95% STERILE WATER FOR IRRIGATION, USP AND APPLICATOR.: Brand: IRRISEPT ANTIMICROBIAL WOUND LAVAGE

## (undated) DEVICE — SYRINGE MED 10ML LUERLOCK TIP W/O SFTY DISP

## (undated) DEVICE — BAND COMPR L24CM REG CLR PLAS HEMSTAT EXT HK AND LOOP RETEN

## (undated) DEVICE — TOOL T12MH25 LEGEND 12CM 2.5MM MH: Brand: MIDAS REX ™